# Patient Record
Sex: FEMALE | Race: BLACK OR AFRICAN AMERICAN | NOT HISPANIC OR LATINO | Employment: FULL TIME | URBAN - METROPOLITAN AREA
[De-identification: names, ages, dates, MRNs, and addresses within clinical notes are randomized per-mention and may not be internally consistent; named-entity substitution may affect disease eponyms.]

---

## 2017-02-07 ENCOUNTER — DOCUMENTATION ONLY (OUTPATIENT)
Dept: FAMILY MEDICINE | Facility: CLINIC | Age: 47
End: 2017-02-07

## 2017-02-07 NOTE — PROGRESS NOTES
Pre-Visit Chart Review  For Appointment Scheduled on 2/9/17    Health Maintenance Due   Topic Date Due    TETANUS VACCINE  10/27/1990

## 2017-02-09 ENCOUNTER — OFFICE VISIT (OUTPATIENT)
Dept: FAMILY MEDICINE | Facility: CLINIC | Age: 47
End: 2017-02-09
Payer: COMMERCIAL

## 2017-02-09 VITALS
HEIGHT: 67 IN | WEIGHT: 157.63 LBS | SYSTOLIC BLOOD PRESSURE: 138 MMHG | HEART RATE: 70 BPM | DIASTOLIC BLOOD PRESSURE: 83 MMHG | TEMPERATURE: 99 F | BODY MASS INDEX: 24.74 KG/M2

## 2017-02-09 DIAGNOSIS — Z86.010 HISTORY OF COLON POLYPS: ICD-10-CM

## 2017-02-09 DIAGNOSIS — M54.50 CHRONIC RIGHT-SIDED LOW BACK PAIN WITHOUT SCIATICA: Primary | ICD-10-CM

## 2017-02-09 DIAGNOSIS — G89.29 CHRONIC RIGHT-SIDED LOW BACK PAIN WITHOUT SCIATICA: Primary | ICD-10-CM

## 2017-02-09 PROCEDURE — 99213 OFFICE O/P EST LOW 20 MIN: CPT | Mod: S$GLB,,, | Performed by: NURSE PRACTITIONER

## 2017-02-09 PROCEDURE — 99999 PR PBB SHADOW E&M-EST. PATIENT-LVL IV: CPT | Mod: PBBFAC,,, | Performed by: NURSE PRACTITIONER

## 2017-02-09 NOTE — PROGRESS NOTES
Subjective:       Patient ID: Dayo Cortes is a 46 y.o. female.    Chief Complaint: Back Pain (right side radiating down leg)    HPI Comments: Ms. Cortes presents to the clinic today for back pain which has been present since September.  She states the pain is to the right lower back, worse with certain positions.  She has a hard time sitting or lying down for very long and gets up to walk around, which helps. She has started exercising a couple weeks ago but that hasn't helped.  She tried ibuprofen which didn't help and she states she does not like to take a lot of medicine.  She has not tried heat or ice.  She has not had formal physical therapy.    Back Pain   This is a chronic problem. The current episode started more than 1 month ago. The problem occurs intermittently. The problem is unchanged. The pain is present in the lumbar spine. The quality of the pain is described as aching. The pain does not radiate. The pain is at a severity of 3/10. The symptoms are aggravated by lying down, sitting and position. Pertinent negatives include no abdominal pain, bladder incontinence, bowel incontinence, chest pain, dysuria, fever, leg pain, numbness, paresis, paresthesias, pelvic pain or weakness. She has tried NSAIDs, home exercises and walking for the symptoms. The treatment provided moderate relief.     Review of Systems   Constitutional: Negative for chills, fatigue and fever.   HENT: Negative for congestion, ear pain and sinus pressure.    Respiratory: Negative for cough and shortness of breath.    Cardiovascular: Negative for chest pain, palpitations and leg swelling.   Gastrointestinal: Negative for abdominal pain, bowel incontinence, constipation and diarrhea.   Genitourinary: Negative for bladder incontinence, dysuria and pelvic pain.   Musculoskeletal: Positive for back pain.   Neurological: Negative for weakness, numbness and paresthesias.       Objective:      Physical Exam   Constitutional: She is  oriented to person, place, and time. She appears well-developed and well-nourished. No distress.   HENT:   Head: Normocephalic and atraumatic.   Right Ear: External ear normal.   Left Ear: External ear normal.   Eyes: Conjunctivae and EOM are normal.   Cardiovascular: Normal rate and regular rhythm.    Pulmonary/Chest: Effort normal.   Musculoskeletal: Normal range of motion.        Lumbar back: She exhibits tenderness and pain. She exhibits normal range of motion, no bony tenderness and no swelling.        Back:    Neurological: She is alert and oriented to person, place, and time.   Skin: Skin is warm and dry.   Psychiatric: She has a normal mood and affect. Her behavior is normal.   Vitals reviewed.          Current Outpatient Prescriptions:     multivitamin capsule, Take 1 capsule by mouth once daily., Disp: , Rfl:     naproxen (NAPROSYN) 500 MG tablet, Take 1 tablet (500 mg total) by mouth 2 (two) times daily with meals., Disp: 60 tablet, Rfl: 0    omeprazole (PRILOSEC) 20 MG capsule, TAKE 1 CAPSULE BY MOUTH DAILY, Disp: 30 capsule, Rfl: 2    SRONYX 0.1-20 mg-mcg per tablet, TAKE 1 TABLET BY MOUTH ONCE DAILY., Disp: 84 tablet, Rfl: 1    valacyclovir (VALTREX) 500 MG tablet, , Disp: , Rfl:   Assessment:       1. Chronic right-sided low back pain without sciatica    2. History of colon polyps        Plan:      Chronic right-sided low back pain without sciatica  Apply heat to site.  Back care tips handout provided.  -     Ambulatory Referral to Physical/Occupational Therapy    History of colon polyps  Per patient, due for colonoscopy.  -     Ambulatory referral to Gastroenterology

## 2017-02-09 NOTE — MR AVS SNAPSHOT
[facility]  [address] [place] [address]  Phone: [phone]  Fax: [phone]                  Dayo Sophia   2017 10:20 AM   Office Visit    Description:  Female : 1970   Provider:  MEGAN Finnegan   Department:  [facility]           Reason for Visit     Back Pain           Diagnoses this Visit        Comments    Chronic right-sided low back pain without sciatica    -  Primary     History of colon polyps                To Do List           Goals (5 Years of Data)     None      Follow-Up and Disposition     Return if symptoms worsen or fail to improve.      [facility]     [facility] Nurse Care Line -  Assistance  Registered nurses in the [facility] Center provide clinical advisement, health education, appointment booking, and other advisory services.  Call for this free service at [phone].             Medications           Message regarding Medications     Verify the changes and/or additions to your medication regime listed below are the same as discussed with your clinician today.  If any of these changes or additions are incorrect, please notify your healthcare provider.             Verify that the below list of medications is an accurate representation of the medications you are currently taking.  If none reported, the list may be blank. If incorrect, please contact your healthcare provider. Carry this list with you in case of emergency.           Current Medications     multivitamin capsule Take 1 capsule by mouth once daily.    naproxen (NAPROSYN) 500 MG tablet Take 1 tablet (500 mg total) by mouth 2 (two) times daily with meals.    omeprazole (PRILOSEC) 20 MG capsule TAKE 1 CAPSULE BY MOUTH DAILY    SRONYX 0.1-20 mg-mcg per tablet TAKE 1 TABLET BY MOUTH ONCE DAILY.    valacyclovir (VALTREX) 500 MG tablet            Clinical Reference Information           Your Vitals Were     BP Pulse Temp    138/83 (BP Location:  "Right arm, Patient Position: Sitting, BP Method: Automatic) 70 98.7 °F (37.1 °C) (Oral)    Height Weight BMI    5' 6.5" (1.689 m) 71.5 kg (157 lb 10.1 oz) 25.06 kg/m2      Blood Pressure          Most Recent Value    BP  138/83      Allergies as of 2/9/2017     No Known Allergies      Immunizations Administered on Date of Encounter - 2/9/2017     None      Orders Placed During Today's Visit      Normal Orders This Visit    Ambulatory referral to Gastroenterology     Ambulatory Referral to Physical/Occupational Therapy       Instructions      Back Care Tips    Caring for your back  These are things you can do to prevent a recurrence of acute back pain and to reduce symptoms from chronic back pain:  · Maintain a healthy weight. If you are overweight, losing weight will help most types of back pain.  · Exercise is an important part of recovery from most types of back pain. The muscles behind and in front of the spine support the back. This means strengthening both the back muscles and the abdominal muscles will provide better support for your spine.   · Swimming and brisk walking are good overall exercises to improve your fitness level.  · Practice safe lifting methods (below).  · Practice good posture when sitting, standing and walking. Avoid prolonged sitting. This puts more stress on the lower back than standing or walking.  · Wear quality shoes with sufficient arch support. Foot and ankle alignment can affect back symptoms. Women should avoid wearing high heels.  · Therapeutic massage can help relax the back muscles without stretching them.  · During the first 24 to 72 hours after an acute injury or flare-up of chronic back pain, apply an ice pack to the painful area for 20 minutes and then remove it for 20 minutes, over a period of 60 to 90 minutes, or several times a day. As a safety precaution, do not use a heating pad at bedtime. Sleeping on a heating pad can lead to skin burns or tissue damage.  · You can " alternate ice and heat therapies.  Medications  Talk to your healthcare provider before using medicines, especially if you have other medical problems or are taking other medicines.  · You may use acetaminophen or ibuprofen to control pain, unless your healthcare provider prescribed other pain medicine. If you have chronic conditions like diabetes, liver or kidney disease, stomach ulcers, or gastrointestinal bleeding, or are taking blood thinners, talk with your healthcare provider before taking any medicines.  · Be careful if you are given prescription pain medicines, narcotics, or medicine for muscle spasm. They can cause drowsiness, affect your coordination, reflexes, and judgment. Do not drive or operate heavy machinery while taking these types of medicines. Take prescription pain medicine only as prescribed by your healthcare provider.  Lumbar stretch  Here is a simple stretching exercise that will help relax muscle spasm and keep your back more limber. If exercise makes your back pain worse, dont do it.  · Lie on your back with your knees bent and both feet on the ground.  · Slowly raise your left knee to your chest as you flatten your lower back against the floor. Hold for 5 seconds.  · Relax and repeat the exercise with your right knee.  · Do 10 of these exercises for each leg.  Safe lifting method  · Dont bend over at the waist to lift an object off the floor.  Instead, bend your knees and hips in a squat.   · Keep your back and head upright  · Hold the object close to your body, directly in front of you.  · Straighten your legs to lift the object.   · Lower the object to the floor in the reverse fashion.  · If you must slide something across the floor, push it.  Posture tips  Sitting  Sit in chairs with straight backs or low-back support. Keep your knees lower than your hips, with your feet flat on the floor.  When driving, sit up straight. Adjust the seat forward so you are not leaning toward the  steering wheel.  A small pillow or rolled towel behind your lower back may help if you are driving long distances.   Standing  When standing for long periods, shift most of your weight to one leg at a time. Alternate legs every few minutes.   Sleeping  The best way to sleep is on your side with your knees bent. Put a low pillow under your head to support your neck in a neutral spine position. Avoid thick pillows that bend your neck to one side. Put a pillow between your legs to further relax your lower back. If you sleep on your back, put pillows under your knees to support your legs in a slightly flexed position. Use a firm mattress. If your mattress sags, replace it, or use a 1/2-inch plywood board under the mattress to add support.  Follow-up care  Follow up with your healthcare provider, or as advised.  If X-rays, a CT scan or an MRI scan were taken, they will be reviewed by a radiologist. You will be notified of any new findings that may affect your care.  Call 911  Seek emergency medical care if any of the following occur:  · Trouble breathing  · Confusion  · Very drowsy  · Fainting or loss of consciousness  · Rapid or very slow heart rate  · Loss of  bowel or bladder control  When to seek medical care  Call your healthcare provider if any of the following occur:  · Pain becomes worse or spreads to your arms or legs  · Weakness or numbness in one or both arms or legs  · Numbness in the groin area  Date Last Reviewed: 6/1/2016  © 1802-0428 [employer]. [address] [place] [address], [place] [address]. All rights reserved. This information is not intended as a substitute for professional medical care. Always follow your healthcare professional's instructions.             Language Assistance Services     ATTENTION: Language assistance services are available, free of charge. Please call [phone].      ATENCIÓN: Si habla español, tiene a ontiveros disposición servicios gratuitos de asistencia lingüística.  Michelle smalls [phone].     TANYA Ý: N?u b?n nói Ti?ng Vi?t, có các d?ch v? h? tr? ngôn ng? mi?n phí dành cho b?n. G?i s? [phone].         [facility] complies with applicable Federal civil rights laws and does not discriminate on the basis of race, color, national origin, age, disability, or sex.

## 2017-02-09 NOTE — PATIENT INSTRUCTIONS
Back Care Tips    Caring for your back  These are things you can do to prevent a recurrence of acute back pain and to reduce symptoms from chronic back pain:  · Maintain a healthy weight. If you are overweight, losing weight will help most types of back pain.  · Exercise is an important part of recovery from most types of back pain. The muscles behind and in front of the spine support the back. This means strengthening both the back muscles and the abdominal muscles will provide better support for your spine.   · Swimming and brisk walking are good overall exercises to improve your fitness level.  · Practice safe lifting methods (below).  · Practice good posture when sitting, standing and walking. Avoid prolonged sitting. This puts more stress on the lower back than standing or walking.  · Wear quality shoes with sufficient arch support. Foot and ankle alignment can affect back symptoms. Women should avoid wearing high heels.  · Therapeutic massage can help relax the back muscles without stretching them.  · During the first 24 to 72 hours after an acute injury or flare-up of chronic back pain, apply an ice pack to the painful area for 20 minutes and then remove it for 20 minutes, over a period of 60 to 90 minutes, or several times a day. As a safety precaution, do not use a heating pad at bedtime. Sleeping on a heating pad can lead to skin burns or tissue damage.  · You can alternate ice and heat therapies.  Medications  Talk to your healthcare provider before using medicines, especially if you have other medical problems or are taking other medicines.  · You may use acetaminophen or ibuprofen to control pain, unless your healthcare provider prescribed other pain medicine. If you have chronic conditions like diabetes, liver or kidney disease, stomach ulcers, or gastrointestinal bleeding, or are taking blood thinners, talk with your healthcare provider before taking any medicines.  · Be careful if you are given  prescription pain medicines, narcotics, or medicine for muscle spasm. They can cause drowsiness, affect your coordination, reflexes, and judgment. Do not drive or operate heavy machinery while taking these types of medicines. Take prescription pain medicine only as prescribed by your healthcare provider.  Lumbar stretch  Here is a simple stretching exercise that will help relax muscle spasm and keep your back more limber. If exercise makes your back pain worse, dont do it.  · Lie on your back with your knees bent and both feet on the ground.  · Slowly raise your left knee to your chest as you flatten your lower back against the floor. Hold for 5 seconds.  · Relax and repeat the exercise with your right knee.  · Do 10 of these exercises for each leg.  Safe lifting method  · Dont bend over at the waist to lift an object off the floor.  Instead, bend your knees and hips in a squat.   · Keep your back and head upright  · Hold the object close to your body, directly in front of you.  · Straighten your legs to lift the object.   · Lower the object to the floor in the reverse fashion.  · If you must slide something across the floor, push it.  Posture tips  Sitting  Sit in chairs with straight backs or low-back support. Keep your knees lower than your hips, with your feet flat on the floor.  When driving, sit up straight. Adjust the seat forward so you are not leaning toward the steering wheel.  A small pillow or rolled towel behind your lower back may help if you are driving long distances.   Standing  When standing for long periods, shift most of your weight to one leg at a time. Alternate legs every few minutes.   Sleeping  The best way to sleep is on your side with your knees bent. Put a low pillow under your head to support your neck in a neutral spine position. Avoid thick pillows that bend your neck to one side. Put a pillow between your legs to further relax your lower back. If you sleep on your back, put pillows  under your knees to support your legs in a slightly flexed position. Use a firm mattress. If your mattress sags, replace it, or use a 1/2-inch plywood board under the mattress to add support.  Follow-up care  Follow up with your healthcare provider, or as advised.  If X-rays, a CT scan or an MRI scan were taken, they will be reviewed by a radiologist. You will be notified of any new findings that may affect your care.  Call 911  Seek emergency medical care if any of the following occur:  · Trouble breathing  · Confusion  · Very drowsy  · Fainting or loss of consciousness  · Rapid or very slow heart rate  · Loss of  bowel or bladder control  When to seek medical care  Call your healthcare provider if any of the following occur:  · Pain becomes worse or spreads to your arms or legs  · Weakness or numbness in one or both arms or legs  · Numbness in the groin area  Date Last Reviewed: 6/1/2016  © 7499-6527 [employer]. [address] [place] [address], [place] [address]. All rights reserved. This information is not intended as a substitute for professional medical care. Always follow your healthcare professional's instructions.

## 2017-02-15 DIAGNOSIS — A60.00 GENITAL HSV: ICD-10-CM

## 2017-02-15 RX ORDER — OMEPRAZOLE 20 MG/1
CAPSULE, DELAYED RELEASE ORAL
Qty: 30 CAPSULE | Refills: 1 | Status: SHIPPED | OUTPATIENT
Start: 2017-02-15 | End: 2019-03-13

## 2017-02-15 RX ORDER — VALACYCLOVIR HYDROCHLORIDE 500 MG/1
TABLET, FILM COATED ORAL
Qty: 180 TABLET | Refills: 0 | Status: SHIPPED | OUTPATIENT
Start: 2017-02-15 | End: 2017-02-25

## 2017-02-24 ENCOUNTER — TELEPHONE (OUTPATIENT)
Dept: FAMILY MEDICINE | Facility: CLINIC | Age: 47
End: 2017-02-24

## 2017-02-24 NOTE — TELEPHONE ENCOUNTER
----- Message from Shad Batista sent at 2/24/2017  3:31 PM CST -----  Contact: pt  Pt is requesting something is called into pharmacy for her for a sore thorat  Call Back#[phone]  Thanks    [id number] [place] - [address]  [address] [place] [address]  Phone: [phone] Fax: [phone]

## 2017-02-24 NOTE — TELEPHONE ENCOUNTER
Patient complains of very sore throat, swollen tonsils x 3days. Had fever, aches, chills, but has subsided. Went to walk in  today given Rocephin, but hasn't helped ST. 2/25/17  0800 for assessment

## 2017-02-25 ENCOUNTER — OFFICE VISIT (OUTPATIENT)
Dept: FAMILY MEDICINE | Facility: CLINIC | Age: 47
End: 2017-02-25
Payer: COMMERCIAL

## 2017-02-25 VITALS
HEIGHT: 66 IN | SYSTOLIC BLOOD PRESSURE: 133 MMHG | DIASTOLIC BLOOD PRESSURE: 87 MMHG | BODY MASS INDEX: 25.51 KG/M2 | HEART RATE: 97 BPM | WEIGHT: 158.75 LBS | TEMPERATURE: 99 F

## 2017-02-25 DIAGNOSIS — J02.9 ACUTE VIRAL PHARYNGITIS: Primary | ICD-10-CM

## 2017-02-25 PROCEDURE — 99213 OFFICE O/P EST LOW 20 MIN: CPT | Mod: 25,S$GLB,, | Performed by: FAMILY MEDICINE

## 2017-02-25 PROCEDURE — 99999 PR PBB SHADOW E&M-EST. PATIENT-LVL III: CPT | Mod: PBBFAC,,, | Performed by: FAMILY MEDICINE

## 2017-02-25 PROCEDURE — 96372 THER/PROPH/DIAG INJ SC/IM: CPT | Mod: S$GLB,,, | Performed by: FAMILY MEDICINE

## 2017-02-25 PROCEDURE — 1160F RVW MEDS BY RX/DR IN RCRD: CPT | Mod: S$GLB,,, | Performed by: FAMILY MEDICINE

## 2017-02-25 RX ORDER — DEXAMETHASONE SODIUM PHOSPHATE 4 MG/ML
8 INJECTION, SOLUTION INTRA-ARTICULAR; INTRALESIONAL; INTRAMUSCULAR; INTRAVENOUS; SOFT TISSUE
Status: DISCONTINUED | OUTPATIENT
Start: 2017-02-25 | End: 2017-02-25

## 2017-02-25 RX ORDER — IBUPROFEN 800 MG/1
800 TABLET ORAL 3 TIMES DAILY
Qty: 30 TABLET | Refills: 3 | Status: SHIPPED | OUTPATIENT
Start: 2017-02-25 | End: 2020-02-13

## 2017-02-25 RX ORDER — BETAMETHASONE SODIUM PHOSPHATE AND BETAMETHASONE ACETATE 3; 3 MG/ML; MG/ML
6 INJECTION, SUSPENSION INTRA-ARTICULAR; INTRALESIONAL; INTRAMUSCULAR; SOFT TISSUE ONCE
Status: COMPLETED | OUTPATIENT
Start: 2017-02-25 | End: 2017-02-25

## 2017-02-25 RX ADMIN — BETAMETHASONE SODIUM PHOSPHATE AND BETAMETHASONE ACETATE 6 MG: 3; 3 INJECTION, SUSPENSION INTRA-ARTICULAR; INTRALESIONAL; INTRAMUSCULAR; SOFT TISSUE at 08:02

## 2017-02-25 NOTE — PROGRESS NOTES
Administered 6 mg Celestone IM. Patient tolerated well. No bleeding at insertion site noted. Pain scale 0/10 with injection. 2 patient identifiers used (name, ), aseptic technique maintained.

## 2017-02-25 NOTE — MR AVS SNAPSHOT
[facility]  [address] [place] [address]  Phone: [phone]  Fax: [phone]                  Dayo Cortes   2017 8:00 AM   Office Visit    Description:  Female : 1970   Provider:  Mikal Ortega MD   Department:  [facility]           Reason for Visit     Sore Throat           Diagnoses this Visit        Comments    Acute viral pharyngitis    -  Primary            To Do List           Goals (5 Years of Data)     None      Follow-Up and Disposition     Return if symptoms worsen or fail to improve.       These Medications        Disp Refills Start End    ibuprofen (ADVIL,MOTRIN) 800 MG tablet 30 tablet 3 2017     Take 1 tablet (800 mg total) by mouth 3 (three) times daily. - Oral    Pharmacy: [id number] - [place] - [address]  #: [phone]         [facility]     [facility] Care [facility] -  Assistance  Registered nurses in the [facility] Call Center provide clinical advisement, health education, appointment booking, and other advisory services.  Call for this free service at [phone].             Medications           Message regarding Medications     Verify the changes and/or additions to your medication regime listed below are the same as discussed with your clinician today.  If any of these changes or additions are incorrect, please notify your healthcare provider.        START taking these NEW medications        Refills    ibuprofen (ADVIL,MOTRIN) 800 MG tablet 3    Sig: Take 1 tablet (800 mg total) by mouth 3 (three) times daily.    Class: Normal    Route: Oral      These medications were administered today        Dose Freq    betamethasone acetate-betamethasone sodium phosphate injection 6 mg 6 mg Once    Sig: Inject 1 mL (6 mg total) into the muscle once.    Class: Normal    Route: Intramuscular           Verify that the below list of medications is an accurate representation of the  medications you are currently taking.  If none reported, the list may be blank. If incorrect, please contact your healthcare provider. Carry this list with you in case of emergency.           Current Medications     multivitamin capsule Take 1 capsule by mouth once daily.    naproxen (NAPROSYN) 500 MG tablet Take 1 tablet (500 mg total) by mouth 2 (two) times daily with meals.    omeprazole (PRILOSEC) 20 MG capsule TAKE ONE CAPSULE BY MOUTH EVERY DAY    SRONYX 0.1-20 mg-mcg per tablet TAKE 1 TABLET BY MOUTH ONCE DAILY.    valacyclovir (VALTREX) 500 MG tablet     ibuprofen (ADVIL,MOTRIN) 800 MG tablet Take 1 tablet (800 mg total) by mouth 3 (three) times daily.           Clinical Reference Information           Your Vitals Were     BP                   133/87           Blood Pressure          Most Recent Value    BP  133/87      Allergies as of 2/25/2017     No Known Allergies      Immunizations Administered on Date of Encounter - 2/25/2017     None      Administrations This Visit     betamethasone acetate-betamethasone sodium phosphate injection 6 mg     Admin Date Action Dose Route Administered By             02/25/2017 Given 6 mg Intramuscular Kathryn Gay LPN                      Language Assistance Services     ATTENTION: Language assistance services are available, free of charge. Please call [phone].      ATENCIÓN: Si habla keith, tiene a ontiveros disposición servicios gratuitos de asistencia lingüística. Llame al [phone].     [facility] Ý: N?u b?n nói Ti?ng Vi?t, có các d?ch v? h? tr? ngôn ng? mi?n phí dành cho b?n. G?i s? [phone].         [facility] complies with applicable Federal civil rights laws and does not discriminate on the basis of race, color, national origin, age, disability, or sex.

## 2017-02-25 NOTE — PROGRESS NOTES
Subjective:       Patient ID: Dayo Cortes is a 46 y.o. female.    Chief Complaint: Sore Throat    HPI     Sore throat  Patient reports sore throat for approximately 3 days.  Patient states that she has no sick contacts identified with similar symptoms.  Her symptoms were an intensity of 8:10 on yesterday.  She went to an urgent care and received a Rocephin shot which has helped somewhat her symptoms.  Patient states she had White residue to back of her throat at some point.  She has had decreased sleep due to symptoms.  Patient reports associated nausea.  Patient has had fever but no chills.    Review of Systems   Constitutional: Positive for fever. Negative for chills.   HENT: Positive for sore throat.    Eyes: Negative for visual disturbance.   Respiratory: Negative for cough and shortness of breath.    Cardiovascular: Negative for chest pain and leg swelling.   Gastrointestinal: Positive for nausea. Negative for abdominal pain, blood in stool, constipation, diarrhea and vomiting.   Genitourinary: Negative for difficulty urinating, dysuria and hematuria.   Musculoskeletal: Negative for arthralgias.       Objective:      Physical Exam   Constitutional: She appears well-developed and well-nourished. No distress.   HENT:   Head: Normocephalic and atraumatic.   Mouth/Throat: Oropharynx is clear and moist. No oropharyngeal exudate.   Shotty lymphadenopathy. Minimal erythema to post. Oropharnx.    Eyes: EOM are normal. Pupils are equal, round, and reactive to light.   Neck: Normal range of motion. Neck supple. No thyromegaly present.   Cardiovascular: Normal rate, regular rhythm, normal heart sounds and intact distal pulses.    Pulmonary/Chest: Effort normal and breath sounds normal. No respiratory distress. She has no wheezes.   Abdominal: Soft. Bowel sounds are normal. She exhibits no distension and no mass. There is no tenderness.   Musculoskeletal: She exhibits no edema.   Lymphadenopathy:     She has no cervical  adenopathy.   Neurological: She is alert.   Skin: Skin is warm. No rash noted. No erythema.   Psychiatric: She has a normal mood and affect. Her behavior is normal.   Vitals reviewed.      Assessment:       1. Acute viral pharyngitis        Plan:       1. Acute viral pharyngitis  - ibuprofen (ADVIL,MOTRIN) 800 MG tablet; Take 1 tablet (800 mg total) by mouth 3 (three) times daily.  Dispense: 30 tablet; Refill: 3  - betamethasone acetate-betamethasone sodium phosphate injection 6 mg; Inject 1 mL (6 mg total) into the muscle once.    Portions of this note were created using Dragon voice recognition software. There may be voice recognition errors found in the text, and attempts were made to correct these errors prior to signature    Mikal Ortega MD    Family Medicine  2/25/2017

## 2017-03-09 ENCOUNTER — TELEPHONE (OUTPATIENT)
Dept: FAMILY MEDICINE | Facility: CLINIC | Age: 47
End: 2017-03-09

## 2017-03-09 ENCOUNTER — OFFICE VISIT (OUTPATIENT)
Dept: FAMILY MEDICINE | Facility: CLINIC | Age: 47
End: 2017-03-09
Payer: COMMERCIAL

## 2017-03-09 VITALS
TEMPERATURE: 100 F | WEIGHT: 154.13 LBS | DIASTOLIC BLOOD PRESSURE: 89 MMHG | HEIGHT: 66 IN | HEART RATE: 90 BPM | OXYGEN SATURATION: 99 % | SYSTOLIC BLOOD PRESSURE: 130 MMHG | BODY MASS INDEX: 24.77 KG/M2

## 2017-03-09 DIAGNOSIS — J02.9 PHARYNGITIS, UNSPECIFIED ETIOLOGY: Primary | ICD-10-CM

## 2017-03-09 LAB
CTP QC/QA: YES
S PYO RRNA THROAT QL PROBE: NEGATIVE

## 2017-03-09 PROCEDURE — 99999 PR PBB SHADOW E&M-EST. PATIENT-LVL III: CPT | Mod: PBBFAC,,, | Performed by: FAMILY MEDICINE

## 2017-03-09 PROCEDURE — 87880 STREP A ASSAY W/OPTIC: CPT | Mod: QW,S$GLB,, | Performed by: FAMILY MEDICINE

## 2017-03-09 PROCEDURE — 99213 OFFICE O/P EST LOW 20 MIN: CPT | Mod: S$GLB,,, | Performed by: FAMILY MEDICINE

## 2017-03-09 PROCEDURE — 1160F RVW MEDS BY RX/DR IN RCRD: CPT | Mod: S$GLB,,, | Performed by: FAMILY MEDICINE

## 2017-03-09 NOTE — TELEPHONE ENCOUNTER
----- Message from Eugenia Mora sent at 3/9/2017  8:10 AM CST -----  Contact: self   Patient wants to speak with a nurse regarding soar throat need some medical advice please call back at [phone] (home)

## 2017-03-09 NOTE — PROGRESS NOTES
Subjective:       Patient ID: Dayo Cortes is a 46 y.o. female.    Chief Complaint: Sore Throat    Sore Throat    This is a new problem. The current episode started in the past 7 days. The problem has been gradually worsening. Maximum temperature: 99.5 now and on Ibuprofen. The pain is moderate. Pertinent negatives include no abdominal pain, congestion, coughing, shortness of breath or vomiting. She has had no exposure to strep. She has tried NSAIDs for the symptoms. The treatment provided moderate relief.     Review of Systems   Constitutional: Negative for fever.   HENT: Positive for sore throat. Negative for congestion.    Respiratory: Negative for cough and shortness of breath.    Cardiovascular: Negative for chest pain.   Gastrointestinal: Positive for nausea. Negative for abdominal pain and vomiting.   Skin: Negative for rash.   All other systems reviewed and are negative.      Objective:      Physical Exam   Constitutional: She appears well-developed. No distress.   HENT:   Right Ear: Tympanic membrane is not erythematous.   Left Ear: Tympanic membrane is not erythematous.   Nose: Mucosal edema present. Right sinus exhibits no maxillary sinus tenderness. Left sinus exhibits no maxillary sinus tenderness.   Mouth/Throat: Posterior oropharyngeal erythema present. Tonsils are 1+ on the right. Tonsils are 1+ on the left. No tonsillar exudate.   Neck: Neck supple.   Cardiovascular: Normal rate and regular rhythm.    No murmur heard.  Pulmonary/Chest: Effort normal and breath sounds normal.   Lymphadenopathy:     She has no cervical adenopathy.       Assessment:       1. Pharyngitis, unspecified etiology        Plan:         Dayo was seen today for sore throat.    Diagnoses and all orders for this visit:    Pharyngitis, unspecified etiology  -     POCT Rapid Strep A    Strep negative; no exudate; prob viral.

## 2017-05-22 ENCOUNTER — TELEPHONE (OUTPATIENT)
Dept: OBSTETRICS AND GYNECOLOGY | Facility: CLINIC | Age: 47
End: 2017-05-22

## 2017-05-22 DIAGNOSIS — A60.00 GENITAL HSV: ICD-10-CM

## 2017-05-22 NOTE — TELEPHONE ENCOUNTER
Called and spoke to pt and she states that with this birth control pill she has been having break thru bleeding states that  She is on her second week of the pill pack and has been more than spotting also feels a little more bloated than usual, takes her birth control faithfully between 7-9 am in the morning. Informed the patient that i would have to check with the dr. Patient states that if dr changes the birth control, she had gotten sick off the last birth control before this one, she could not tolerate it.

## 2017-05-22 NOTE — TELEPHONE ENCOUNTER
----- Message from Alvina Jimenez sent at 5/22/2017  9:22 AM CDT -----  Contact: patient  Patient calling in regards to speaking with a Nurse. She is having a break through with her birth control. Please advise.  Call back .  Thanks!

## 2017-05-23 ENCOUNTER — TELEPHONE (OUTPATIENT)
Dept: FAMILY MEDICINE | Facility: CLINIC | Age: 47
End: 2017-05-23

## 2017-05-23 ENCOUNTER — TELEPHONE (OUTPATIENT)
Dept: OBSTETRICS AND GYNECOLOGY | Facility: CLINIC | Age: 47
End: 2017-05-23

## 2017-05-23 NOTE — TELEPHONE ENCOUNTER
----- Message from Mary Montoya sent at 5/23/2017 12:39 PM CDT -----  Refill on Rx Valacy.  Please send into [facility], call patient when called in at [phone].

## 2017-05-23 NOTE — TELEPHONE ENCOUNTER
----- Message from Mary Montoya sent at 5/23/2017 12:39 PM CDT -----  Refill on Rx Valacy.  Please send into [facility], call patient when called in at [phone].

## 2017-05-23 NOTE — TELEPHONE ENCOUNTER
----- Message from Hilaria Lundberg sent at 5/23/2017  9:00 AM CDT -----  Contact: self  Patient called asking for advice about break through bleeding with her birth control.  Please call patient at [phone]. Thanks!

## 2017-05-23 NOTE — TELEPHONE ENCOUNTER
Patient called back today , states that she said that today she is not spotting today it is a full blown period with clotting has changed 3 pads today, and is wanting to get something to stop the period, feels like she started the period earlier. Please advise?

## 2017-05-24 ENCOUNTER — TELEPHONE (OUTPATIENT)
Dept: FAMILY MEDICINE | Facility: CLINIC | Age: 47
End: 2017-05-24

## 2017-05-24 RX ORDER — VALACYCLOVIR HYDROCHLORIDE 500 MG/1
TABLET, FILM COATED ORAL
Qty: 180 TABLET | Refills: 0 | Status: SHIPPED | OUTPATIENT
Start: 2017-05-24 | End: 2017-12-28 | Stop reason: SDUPTHER

## 2017-05-24 NOTE — TELEPHONE ENCOUNTER
----- Message from Germania Sue sent at 5/24/2017  3:40 PM CDT -----  Patient was told by nurse yesterday that doctor would okay her prescription for genital warts this morning. Pharmacy has not received it yet. Please remit to:      [id number] - [place] - [address]  [address] [place] [address]  Phone: [phone] Fax: [phone]    Please call patient back when completed at [phone].

## 2017-06-02 ENCOUNTER — OFFICE VISIT (OUTPATIENT)
Dept: GASTROENTEROLOGY | Facility: CLINIC | Age: 47
End: 2017-06-02
Payer: COMMERCIAL

## 2017-06-02 VITALS
DIASTOLIC BLOOD PRESSURE: 78 MMHG | SYSTOLIC BLOOD PRESSURE: 136 MMHG | BODY MASS INDEX: 25.75 KG/M2 | WEIGHT: 160.25 LBS | HEART RATE: 88 BPM | HEIGHT: 66 IN

## 2017-06-02 DIAGNOSIS — D25.9 UTERINE FIBROIDS AFFECTING PREGNANCY, UNSPECIFIED TRIMESTER: ICD-10-CM

## 2017-06-02 DIAGNOSIS — O34.10 UTERINE FIBROIDS AFFECTING PREGNANCY, UNSPECIFIED TRIMESTER: ICD-10-CM

## 2017-06-02 DIAGNOSIS — Z83.719 FAMILY HISTORY OF COLONIC POLYPS: ICD-10-CM

## 2017-06-02 DIAGNOSIS — Z86.010 HISTORY OF COLON POLYPS: ICD-10-CM

## 2017-06-02 DIAGNOSIS — Z80.0 FAMILY HISTORY OF COLON CANCER: ICD-10-CM

## 2017-06-02 DIAGNOSIS — K21.9 GASTROESOPHAGEAL REFLUX DISEASE WITHOUT ESOPHAGITIS: Primary | ICD-10-CM

## 2017-06-02 DIAGNOSIS — K62.5 BRBPR (BRIGHT RED BLOOD PER RECTUM): ICD-10-CM

## 2017-06-02 PROCEDURE — 99244 OFF/OP CNSLTJ NEW/EST MOD 40: CPT | Mod: S$GLB,,, | Performed by: INTERNAL MEDICINE

## 2017-06-02 PROCEDURE — 99999 PR PBB SHADOW E&M-EST. PATIENT-LVL II: CPT | Mod: PBBFAC,,, | Performed by: INTERNAL MEDICINE

## 2017-06-02 NOTE — LETTER
June 2, 2017      Linden Doshi MD  [address]  [place] [address]           [place] [facility]  [address]  [place] [address]  Phone: [phone]          Patient: Dayo Cortes   MR Number: [id number]   YOB: 1970   Date of Visit: 6/2/2017       Dear Dr. Linden Doshi:    Thank you for referring Dayo Cortes to me for evaluation. Attached you will find relevant portions of my assessment and plan of care.    If you have questions, please do not hesitate to call me. I look forward to following Dayo Cortes along with you.    Sincerely,    Win Rodriguez MD    Enclosure  CC:  No Recipients    If you would like to receive this communication electronically, please contact [email or web address] or [phone] to request more information on [facility] access.    For providers and/or their staff who would like to refer a patient to [facility], please contact us through our one-stop-shop provider referral line, [facility], at [phone].    If you feel you have received this communication in error or would no longer like to receive these types of communications, please e-mail [email or web address]

## 2017-06-02 NOTE — PROGRESS NOTES
Subjective:       Patient ID: Dayo Cortes is a 46 y.o. female.    This patient is new to me.  Referring MD:  Dr. Doshi for BRBPR.      Chief Complaint: BRBPR    Patient seen for evaluation of blood in stool, noted grossly, diagnosed recently, mild to moderate in severity, associated with rectal irritation and protruding hemorrhoids, with alleviating/exacerbating factors including none.   Previous colonoscopy was 2003.  Family history of colon cancer is positive in a second degree relative, however, her mother had adenomatous polyps in her 30s.  In addition, the patient had a colon polyp (unknown histology) [place].  Images from this were independently reviewed and polyp appeared to be about 5 mm in size.  She admits to intermittent dyspepsia, but has no alarm symptoms.  She takes on demand PPI with good relief and intermittently uses NSAIDs for back pain.  She has never had EGD.  She has some uterine bleeding with a known history of fibroids and is being followed by GYN for this.        Review of Systems   Constitutional: Negative for chills, fatigue and fever.   HENT: Negative for sore throat and trouble swallowing.    Respiratory: Negative for cough, shortness of breath and wheezing.    Cardiovascular: Negative for chest pain and palpitations.   Gastrointestinal: Positive for blood in stool. Negative for abdominal pain, nausea and vomiting.   Genitourinary: Positive for vaginal bleeding. Negative for dysuria and hematuria.   Musculoskeletal: Positive for back pain. Negative for arthralgias and myalgias.   Skin: Negative for color change and rash.   Neurological: Negative for dizziness and headaches.   Hematological: Negative for adenopathy.   Psychiatric/Behavioral: Negative for confusion. The patient is not nervous/anxious.    All other systems reviewed and are negative.      Objective:       Vitals:    06/02/17 0936   BP: 136/78   Pulse: 88   Weight: 72.7 kg (160 lb 4.4 oz)  "  Height: 5' 6" (1.676 m)       Physical Exam   Constitutional: She appears well-developed and well-nourished.   HENT:   Head: Normocephalic and atraumatic.   Eyes: Pupils are equal, round, and reactive to light. No scleral icterus.   Neck: Normal range of motion.   Cardiovascular: Normal rate and regular rhythm.    No murmur heard.  Pulmonary/Chest: Effort normal and breath sounds normal. She has no wheezes.   Abdominal: Soft. Bowel sounds are normal. She exhibits no distension. There is no tenderness.   Musculoskeletal: She exhibits no edema or tenderness.   Lymphadenopathy:     She has no cervical adenopathy.   Neurological: She is alert.   Skin: Skin is warm and dry. No rash noted.         Lab Results   Component Value Date    WBC 7.40 09/01/2016    HGB 13.0 09/01/2016    HCT 40.0 09/01/2016    MCV 82 09/01/2016     09/01/2016       CMP  Sodium   Date Value Ref Range Status   09/01/2016 138 136 - 145 mmol/L Final     Potassium   Date Value Ref Range Status   09/01/2016 3.9 3.5 - 5.1 mmol/L Final     Chloride   Date Value Ref Range Status   09/01/2016 106 95 - 110 mmol/L Final     CO2   Date Value Ref Range Status   09/01/2016 26 23 - 29 mmol/L Final     Glucose   Date Value Ref Range Status   09/01/2016 81 70 - 110 mg/dL Final     BUN, Bld   Date Value Ref Range Status   09/01/2016 14 6 - 20 mg/dL Final     Creatinine   Date Value Ref Range Status   09/01/2016 1.0 0.5 - 1.4 mg/dL Final     Calcium   Date Value Ref Range Status   09/01/2016 9.3 8.7 - 10.5 mg/dL Final     Total Protein   Date Value Ref Range Status   09/01/2016 7.3 6.0 - 8.4 g/dL Final     Albumin   Date Value Ref Range Status   09/01/2016 3.8 3.5 - 5.2 g/dL Final     Total Bilirubin   Date Value Ref Range Status   09/01/2016 0.9 0.1 - 1.0 mg/dL Final     Comment:     For infants and newborns, interpretation of results should be based  on gestational age, weight and in agreement with clinical  observations.  Premature Infant recommended " reference ranges:  Up to 24 hours.............<8.0 mg/dL  Up to 48 hours............<12.0 mg/dL  3-5 days..................<15.0 mg/dL  6-29 days.................<15.0 mg/dL       Alkaline Phosphatase   Date Value Ref Range Status   09/01/2016 49 (L) 55 - 135 U/L Final     AST   Date Value Ref Range Status   09/01/2016 30 10 - 40 U/L Final     ALT   Date Value Ref Range Status   09/01/2016 21 10 - 44 U/L Final     Anion Gap   Date Value Ref Range Status   09/01/2016 6 (L) 8 - 16 mmol/L Final     eGFR if    Date Value Ref Range Status   09/01/2016 >60.0 >60 mL/min/1.73 m^2 Final     eGFR if non    Date Value Ref Range Status   09/01/2016 >60.0 >60 mL/min/1.73 m^2 Final     Comment:     Calculation used to obtain the estimated glomerular filtration  rate (eGFR) is the CKD-EPI equation. Since race is unknown   in our information system, the eGFR values for   -American and Non--American patients are given   for each creatinine result.         Images from prior colonoscopy were independently visualized and reviewed by me and showed findings as above in HPI.    Old records from Dr. Doshi reviewed and are notable for history as above in HPI as well as minor BRBPR for which he has requested further evaluation.        Assessment:       1. Gastroesophageal reflux disease without esophagitis    2. BRBPR (bright red blood per rectum)    3. History of colon polyps    4. Family history of colonic polyps    5. Family history of colon cancer    6. Uterine fibroids affecting pregnancy, unspecified trimester        Plan:       1.  High fiber diet  2.  Topical therapy for hemorrhoids including sitz baths and balneol lotion  3.  Colonoscopy for surveillance  4.  EGD deferred for now given absence of alarm features  5.  Recommend daily PPI while taking NSAIDs.  Otherwise may use on demand.  6.  Consider referral to colorectal surgery for hemorrhoidal banding depending on colonoscopy  findings.  7.  Further recommendations to follow after above.  8.  Communication will be sent to the referring MD, Dr. Doshi regarding my assessment and plan on this patient via EPIC.

## 2017-06-05 ENCOUNTER — TELEPHONE (OUTPATIENT)
Dept: OBSTETRICS AND GYNECOLOGY | Facility: CLINIC | Age: 47
End: 2017-06-05

## 2017-06-05 RX ORDER — LEVONORGESTREL AND ETHINYL ESTRADIOL 0.1-0.02MG
1 KIT ORAL DAILY
Qty: 28 TABLET | Refills: 12 | Status: SHIPPED | OUTPATIENT
Start: 2017-06-05 | End: 2017-07-07

## 2017-06-05 NOTE — TELEPHONE ENCOUNTER
----- Message from Nancy Davis sent at 6/5/2017  8:37 AM CDT -----  Patient is requesting to change her birth control Rx please call to mary, [phone] (home)

## 2017-06-05 NOTE — TELEPHONE ENCOUNTER
Patient is needing a refill on her birth control, but she would like something different than the Sronyx. She states that she has had a 10lb weight gain in the last 3 months and believes it is related to the Sronyx. She is requesting to change back to the levonorgestrel-ethinyl estradiol. Please advise./lpd

## 2017-06-07 ENCOUNTER — SURGERY (OUTPATIENT)
Age: 47
End: 2017-06-07

## 2017-06-07 ENCOUNTER — ANESTHESIA EVENT (OUTPATIENT)
Dept: ENDOSCOPY | Facility: HOSPITAL | Age: 47
End: 2017-06-07
Payer: COMMERCIAL

## 2017-06-07 ENCOUNTER — ANESTHESIA (OUTPATIENT)
Dept: ENDOSCOPY | Facility: HOSPITAL | Age: 47
End: 2017-06-07
Payer: COMMERCIAL

## 2017-06-07 ENCOUNTER — HOSPITAL ENCOUNTER (OUTPATIENT)
Facility: HOSPITAL | Age: 47
Discharge: HOME | End: 2017-06-07
Attending: INTERNAL MEDICINE | Admitting: INTERNAL MEDICINE
Payer: COMMERCIAL

## 2017-06-07 VITALS — RESPIRATION RATE: 15 BRPM

## 2017-06-07 DIAGNOSIS — Z86.010 HX OF COLONIC POLYPS: ICD-10-CM

## 2017-06-07 DIAGNOSIS — K63.5 POLYP OF COLON, UNSPECIFIED PART OF COLON, UNSPECIFIED TYPE: Primary | ICD-10-CM

## 2017-06-07 DIAGNOSIS — K64.8 INTERNAL HEMORRHOIDS: ICD-10-CM

## 2017-06-07 PROBLEM — Z86.0100 HX OF COLONIC POLYPS: Status: ACTIVE | Noted: 2017-06-07

## 2017-06-07 LAB
B-HCG UR QL: NEGATIVE
CTP QC/QA: YES

## 2017-06-07 PROCEDURE — 63600175 PHARM REV CODE 636 W HCPCS: Performed by: NURSE ANESTHETIST, CERTIFIED REGISTERED

## 2017-06-07 PROCEDURE — 27201012 HC FORCEPS, HOT/COLD, DISP: Performed by: INTERNAL MEDICINE

## 2017-06-07 PROCEDURE — 81025 URINE PREGNANCY TEST: CPT | Performed by: INTERNAL MEDICINE

## 2017-06-07 PROCEDURE — 37000009 HC ANESTHESIA EA ADD 15 MINS: Performed by: INTERNAL MEDICINE

## 2017-06-07 PROCEDURE — 37000008 HC ANESTHESIA 1ST 15 MINUTES: Performed by: INTERNAL MEDICINE

## 2017-06-07 PROCEDURE — 45385 COLONOSCOPY W/LESION REMOVAL: CPT | Mod: ,,, | Performed by: INTERNAL MEDICINE

## 2017-06-07 PROCEDURE — 25000003 PHARM REV CODE 250: Performed by: INTERNAL MEDICINE

## 2017-06-07 PROCEDURE — 45380 COLONOSCOPY AND BIOPSY: CPT | Performed by: INTERNAL MEDICINE

## 2017-06-07 PROCEDURE — 45380 COLONOSCOPY AND BIOPSY: CPT | Mod: 59,,, | Performed by: INTERNAL MEDICINE

## 2017-06-07 PROCEDURE — 45385 COLONOSCOPY W/LESION REMOVAL: CPT | Performed by: INTERNAL MEDICINE

## 2017-06-07 PROCEDURE — 88305 TISSUE EXAM BY PATHOLOGIST: CPT | Performed by: PATHOLOGY

## 2017-06-07 PROCEDURE — D9220A PRA ANESTHESIA: Mod: CRNA,,, | Performed by: NURSE ANESTHETIST, CERTIFIED REGISTERED

## 2017-06-07 PROCEDURE — D9220A PRA ANESTHESIA: Mod: ANES,,, | Performed by: ANESTHESIOLOGY

## 2017-06-07 PROCEDURE — 27201089 HC SNARE, DISP (ANY): Performed by: INTERNAL MEDICINE

## 2017-06-07 RX ORDER — SODIUM CHLORIDE 9 MG/ML
INJECTION, SOLUTION INTRAVENOUS CONTINUOUS
Status: DISCONTINUED | OUTPATIENT
Start: 2017-06-07 | End: 2017-06-07 | Stop reason: HOSPADM

## 2017-06-07 RX ORDER — PROPOFOL 10 MG/ML
VIAL (ML) INTRAVENOUS
Status: DISCONTINUED | OUTPATIENT
Start: 2017-06-07 | End: 2017-06-07

## 2017-06-07 RX ORDER — HYDROCORTISONE ACETATE 25 MG/1
25 SUPPOSITORY RECTAL 2 TIMES DAILY
Qty: 20 SUPPOSITORY | Refills: 0 | Status: SHIPPED | OUTPATIENT
Start: 2017-06-07 | End: 2017-06-17

## 2017-06-07 RX ADMIN — PROPOFOL 50 MG: 10 INJECTION, EMULSION INTRAVENOUS at 09:06

## 2017-06-07 RX ADMIN — SODIUM CHLORIDE 1000 ML: 0.9 INJECTION, SOLUTION INTRAVENOUS at 10:06

## 2017-06-07 RX ADMIN — SODIUM CHLORIDE 1000 ML: 0.9 INJECTION, SOLUTION INTRAVENOUS at 08:06

## 2017-06-07 RX ADMIN — PROPOFOL 120 MG: 10 INJECTION, EMULSION INTRAVENOUS at 09:06

## 2017-06-07 NOTE — TRANSFER OF CARE
"Anesthesia Transfer of Care Note    Patient: Dayo Cortes    Procedure(s) Performed: Procedure(s) (LRB):  COLONOSCOPY (N/A)    Patient location: GI    Anesthesia Type: general    Transport from OR: Transported from OR on 2-3 L/min O2 by NC with adequate spontaneous ventilation    Post pain: adequate analgesia    Post assessment: no apparent anesthetic complications and tolerated procedure well    Post vital signs: stable    Level of consciousness: awake    Nausea/Vomiting: no nausea/vomiting    Complications: none    Transfer of care protocol was followed      Last vitals:   Visit Vitals  /74 (BP Location: Left arm, Patient Position: Lying, BP Method: Automatic)   Pulse 90   Temp 36.3 °C (97.3 °F) (Oral)   Resp 13   Ht 5' 6" (1.676 m)   Wt 71.2 kg (157 lb)   LMP 05/15/2017   SpO2 100%   Breastfeeding? No   BMI 25.34 kg/m²     "

## 2017-06-07 NOTE — ANESTHESIA POSTPROCEDURE EVALUATION
"Anesthesia Post Evaluation    Patient: Dayo Cortes    Procedure(s) Performed: Procedure(s) (LRB):  COLONOSCOPY (N/A)    Final Anesthesia Type: general  Patient location during evaluation: PACU  Patient participation: Yes- Able to Participate  Level of consciousness: awake and alert and oriented  Post-procedure vital signs: reviewed and stable  Pain management: adequate  Airway patency: patent  PONV status at discharge: No PONV  Anesthetic complications: no      Cardiovascular status: blood pressure returned to baseline  Respiratory status: unassisted, spontaneous ventilation and room air  Hydration status: euvolemic  Follow-up not needed.        Visit Vitals  BP (!) 124/92   Pulse 72   Temp 36.3 °C (97.3 °F) (Oral)   Resp 16   Ht 5' 6" (1.676 m)   Wt 71.2 kg (157 lb)   LMP 05/15/2017   SpO2 100%   Breastfeeding? No   BMI 25.34 kg/m²       Pain/Temo Score: Pain Assessment Performed: Yes (6/7/2017  9:40 AM)  Presence of Pain: denies (6/7/2017  8:33 AM)      "

## 2017-06-07 NOTE — PLAN OF CARE
Pt states ready to go home , stable, nishant po fluids, ambulatory, denies pain. To car by w/c accompanied by pt escort.home w/ family

## 2017-06-07 NOTE — DISCHARGE INSTRUCTIONS
"Discharge Instructions: After Your Surgery/Procedure  Youve just had surgery. During surgery you were given medicine called anesthesia to keep you relaxed and free of pain. After surgery you may have some pain or nausea. This is common. Here are some tips for feeling better and getting well after surgery.     Stay on schedule with your medication.   Going home  Your doctor or nurse will show you how to take care of yourself when you go home. He or she will also answer your questions. Have an adult family member or friend drive you home.      For your safety we recommend these precaution for the first 24 hours after your procedure:  · Do not drive or use heavy equipment.  · Do not make important decisions or sign legal papers.  · Do not drink alcohol.  · Have someone stay with you, if needed. He or she can watch for problems and help keep you safe.  · Your concentration, balance, coordination, and judgement may be impaired for many hours after anesthesia.  Use caution when ambulating or standing up.     · You may feel weak and "washed out" after anesthesia and surgery.      Subtle residual effects of general anesthesia or sedation with regional / local anesthesia can last more than 24 hours.  Rest for the remainder of the day or longer if your Doctor/Surgeon has advised you to do so.  Although you may feel normal within the first 24 hours, your reflexes and mental ability may be impaired without you realizing it.  You may feel dizzy, lightheaded or sleepy for 24 hours or longer.      Be sure to go to all follow-up visits with your doctor. And rest after your surgery for as long as your doctor tells you to.  Coping with pain  If you have pain after surgery, pain medicine will help you feel better. Take it as told, before pain becomes severe. Also, ask your doctor or pharmacist about other ways to control pain. This might be with heat, ice, or relaxation. And follow any other instructions your surgeon or nurse gives " you.  Tips for taking pain medicine  To get the best relief possible, remember these points:  · Pain medicines can upset your stomach. Taking them with a little food may help.  · Most pain relievers taken by mouth need at least 20 to 30 minutes to start to work.  · Taking medicine on a schedule can help you remember to take it. Try to time your medicine so that you can take it before starting an activity. This might be before you get dressed, go for a walk, or sit down for dinner.  · Constipation is a common side effect of pain medicines. Call your doctor before taking any medicines such as laxatives or stool softeners to help ease constipation. Also ask if you should skip any foods. Drinking lots of fluids and eating foods such as fruits and vegetables that are high in fiber can also help. Remember, do not take laxatives unless your surgeon has prescribed them.  · Drinking alcohol and taking pain medicine can cause dizziness and slow your breathing. It can even be deadly. Do not drink alcohol while taking pain medicine.  · Pain medicine can make you react more slowly to things. Do not drive or run machinery while taking pain medicine.  Your health care provider may tell you to take acetaminophen to help ease your pain. Ask him or her how much you are supposed to take each day. Acetaminophen or other pain relievers may interact with your prescription medicines or other over-the-counter (OTC) drugs. Some prescription medicines have acetaminophen and other ingredients. Using both prescription and OTC acetaminophen for pain can cause you to overdose. Read the labels on your OTC medicines with care. This will help you to clearly know the list of ingredients, how much to take, and any warnings. It may also help you not take too much acetaminophen. If you have questions or do not understand the information, ask your pharmacist or health care provider to explain it to you before you take the OTC medicine.  Managing  nausea  Some people have an upset stomach after surgery. This is often because of anesthesia, pain, or pain medicine, or the stress of surgery. These tips will help you handle nausea and eat healthy foods as you get better. If you were on a special food plan before surgery, ask your doctor if you should follow it while you get better. These tips may help:  · Do not push yourself to eat. Your body will tell you when to eat and how much.  · Start off with clear liquids and soup. They are easier to digest.  · Next try semi-solid foods, such as mashed potatoes, applesauce, and gelatin, as you feel ready.  · Slowly move to solid foods. Dont eat fatty, rich, or spicy foods at first.  · Do not force yourself to have 3 large meals a day. Instead eat smaller amounts more often.  · Take pain medicines with a small amount of solid food, such as crackers or toast, to avoid nausea.     Call your surgeon if  · You still have pain an hour after taking medicine. The medicine may not be strong enough.  · You feel too sleepy, dizzy, or groggy. The medicine may be too strong.  · You have side effects like nausea, vomiting, or skin changes, such as rash, itching, or hives.       If you have obstructive sleep apnea  You were given anesthesia medicine during surgery to keep you comfortable and free of pain. After surgery, you may have more apnea spells because of this medicine and other medicines you were given. The spells may last longer than usual.   At home:  · Keep using the continuous positive airway pressure (CPAP) device when you sleep. Unless your health care provider tells you not to, use it when you sleep, day or night. CPAP is a common device used to treat obstructive sleep apnea.  · Talk with your provider before taking any pain medicine, muscle relaxants, or sedatives. Your provider will tell you about the possible dangers of taking these medicines.  © 7468-0083 [employer]. [address] [place] [address]  [place] [address]. All rights reserved. This information is not intended as a substitute for professional medical care. Always follow your healthcare professional's instructions.    Hemorrhoids    Hemorrhoids are swollen and inflamed veins inside the rectum and near the anus. The rectum is the last several inches of the colon. The anus is the passage between the rectum and the outside of the body.  Causes  The veins can become swollen due to increased pressure in them. This is most often caused by:  · Chronic constipation or diarrhea  · Straining when having a bowel movement  · Sitting too long on the toilet  · A low-fiber diet  · Pregnancy  Symptoms  · Bleeding from the rectum (this may be noticeable after bowel movements)  · Lump near the anus  · Itching around the anus  · Pain around the anus  There are different types of hemorrhoids. Depending on the type you have and the severity, you may be able to treat yourself at home. In some cases, a procedure may be the best treatment option. Your healthcare provider can tell you more about this, if needed.  Home care  General care  · To get relief from pain or itching, try:  ¨ Topical products. Your healthcare provider may prescribe or recommend creams, ointments, or pads that can be applied to the hemorrhoid. Use these exactly as directed.  ¨ Medicines. Your healthcare provider may recommend stool softeners, suppositories, or laxatives to help manage constipation. Use these exactly as directed.  ¨ Sitz baths. A sitz bath involves sitting in a few inches of warm bath water. Be careful not to make the water so hot that you burn yourself--test it before sitting in it. Soak for about 10 to 15 minutes a few times a day. This may help relieve pain.  Tips to help prevent hemorrhoids  · Eat more fiber. Fiber adds bulk to stool and absorbs water as it moves through your colon. This makes stool softer and easier to pass.  ¨ Increase the fiber in your diet with more fiber-rich foods.  These include fresh fruit, vegetables, and whole grains.  ¨ Take a fiber supplement or bulking agent, if advised to by your provider. These include products such as psyllium or methylcellulose.  · Drink plenty of water, if directed to by your provider. This can help keep stool soft.  · Be more active. Frequent exercise aids digestion and helps prevent constipation. It may also help make bowel movements more regular.  · Dont strain during bowel movements. This can make hemorrhoids more likely. Also, dont sit on the toilet for long periods of time.  Follow-up care  Follow up with your healthcare provider, or as advised. If a culture or imaging tests were done, you will be notified of the results when they are ready. This may take a few days or longer.  When to seek medical advice  Call your healthcare provider right away if any of these occur:  · Increased bleeding from the rectum  · Increased pain around the rectum or anus  · Weakness or dizziness  Call 911  Call 911 or return to the emergency department right away if any of these occur:  · Trouble breathing or swallowing  · Fainting or loss of consciousness  · Unusually fast heart rate  · Vomiting blood  · Large amounts of blood in stool  Date Last Reviewed: 6/22/2015  © 1372-1511 [employer]. [address] [place] [address], [place] [address]. All rights reserved. This information is not intended as a substitute for professional medical care. Always follow your healthcare professional's instructions.      Discharge Instructions: Eating a High-Fiber Diet  Your health care provider has prescribed a high-fiber diet for you. Fiber is what gives strength and structure to plants. Most grains, beans, vegetables, and fruits contain fiber. Foods rich in fiber are often low in calories and fat, but they fill you up more. These foods may also reduce the risk of certain health problems.  There are two types of fiber:  · Insoluble fiber. This is found in whole  grains, cereals, and certain fruits and vegetables (such as apple skins, corn, and beans). Insoluble fiber is made up mainly of plant cell walls. It may prevent constipation and reduce the risk of certain types of cancer.  · Soluble fiber. This type of fiber is found in oats, beans, nuts, and certain fruits and vegetables (such as strawberries and peas). Soluble fiber turns to gel in the digestive system, slowing the movement of the digestive tract. It helps control blood sugar levels and can reduce cholesterol, which may help lower the risk of heart disease. Soluble fiber can also help control appetite.     Home care  · Know how much fiber you need a day. The recommended daily amount of fiber is 25 grams for women and 38 grams for men. After age 50, daily fiber needs drop to 21 grams for women and 30 grams for men.  · Ask your doctor about a fiber supplement. (Always take fiber supplements with a large glass of water.)  · Keep track of how much fiber you eat.  · Eat a variety of foods high in fiber.  · Learn to read and understand food labels.  · Ask your healthcare provider how much water you should be drinking.  · Look for these high-fiber foods:  ¨ Whole-grain breads and cereals  § 6 ounces a day give you about 18 grams of fiber (1 ounce is equal to 1 slice of bread, 1 cup of dry cereal, or 1/2 cup of cooked rice).  § Include wheat and oat bran cereals, whole-wheat muffins or toast, and corn tortillas in your meals.  ¨ Fruits   § 2 cups a day give you about 8 grams of fiber.  § Apples, oranges, strawberries, pears, and bananas are good sources.  § Fruit juice does not contain as much fiber as the fruit it was made from.  ¨ Vegetables  § 2½ cups a day give you about 11 grams of fiber. Add asparagus, carrots, broccoli, peas, and corn to your meals.  ¨ Legumes  § 1/4 cup a day (in place of meat) gives you about 4 grams of fiber. Try navy beans, lentils, chickpeas, and soybeans.  ¨ Seeds   § A small handful of seeds  gives you about 3 grams of fiber. Try sunflower seeds.  Follow-up  Make a follow-up appointment with a nutritionist as directed by our staff.  Date Last Reviewed: 6/1/2015 © 2000-2016 [employer].. [address] [place] [address], [place] [address]. All rights reserved. This information is not intended as a substitute for professional medical care. Always follow your healthcare professional's instructions.        Understanding Colon and Rectal Polyps    The colon (also called the large intestine) is a muscular tube that forms the last part of the digestive tract. It absorbs water and stores food waste. The colon is about 4 to 6 feet long. The rectum is the last 6 inches of the colon. The colon and rectum have a smooth lining composed of millions of cells. Changes in these cells can lead to growths in the colon that can become cancerous and should be removed. Multiple tests are available to screen for colon cancer, but the colonoscopy is the most recommended test. During colonoscopy, these polyps can be removed. How often you need this test depends on many things including your condition, your family history, symptoms, and what the findings were at the previous colonoscopy.   When the colon lining changes  Changes that happen in the cells that line the colon or rectum can lead to growths called polyps. Over a period of years, polyps can turn cancerous. Removing polyps early may prevent cancer from ever forming.  Polyps  Polyps are fleshy clumps of tissue that form on the lining of the colon or rectum. Small polyps are usually benign (not cancerous). However, over time, cells in a polyp can change and become cancerous. Certain types of polyps known as adenomatous polyps are premalignant. The risk for invasive cancer increases with the size of the polyp and certain cell and gene features. This means that they can become cancerous if they're not removed. Hyperplastic polyps are benign. They can grow quite large and  not turn cancerous.   Cancer  Almost all colorectal cancers start when polyp cells begin growing abnormally. As a cancerous tumor grows, it may involve more and more of the colon or rectum. In time, cancer can also grow beyond the colon or rectum and spread to nearby organs or to glands called lymph nodes. The cells can also travel to other parts of the body. This is known as metastasis. The earlier a cancerous tumor is removed, the better the chance of preventing its spread.    Date Last Reviewed: 8/1/2016 © 2000-2016 [employer]. [address] [place] [address], [place] [address]. All rights reserved. This information is not intended as a substitute for professional medical care. Always follow your healthcare professional's instructions.      Procedural Sedation (Adult)  You have been given medicine by vein to make you sleep during your surgery. This may have included both a pain medicine and sleeping medicine. Most of the effects have worn off. But you may still have some drowsiness for the next 6 to 8 hours.  Home care  Follow these guidelines when you get home:  · For the next 8 hours, you should be watched by a responsible adult. This person should make sure your condition is not getting worse.  · Don't take any medicine by mouth for pain or for sleep during the next 4 hours. These might react with the medicines you were given in the hospital. This could cause a much stronger response than usual.  · Don't drink any alcohol for the next 24 hours.  · Don't drive, operate dangerous machinery, or make important business or personal decisions during the next 24 hours.  Follow-up care  Follow up with your healthcare provider if you are not alert and back to your usual level of activity within 12 hours.  When to seek medical advice  Call your healthcare provider right away if any of these occur:  · Drowsiness gets worse  · Weakness or dizziness gets worse  · Repeated vomiting  · You cannot be awakened   Date  Last Reviewed: 10/18/2016  © 4788-7126 [employer], "[employer] [address] [place] [address], [place] [address]. All rights reserved. This information is not intended as a substitute for professional medical care. Always follow your healthcare professional's instructions.

## 2017-06-07 NOTE — ANESTHESIA PREPROCEDURE EVALUATION
06/07/2017  Dayo Cortes is a 46 y.o., female.    Anesthesia Evaluation    I have reviewed the Patient Summary Reports.    I have reviewed the Nursing Notes.   I have reviewed the Medications.     Review of Systems  Anesthesia Hx:  No problems with previous Anesthesia    Social:  Non-Smoker    Hepatic/GI:   GERD, poorly controlled        Physical Exam  General:  Well nourished    Airway/Jaw/Neck:  Airway Findings: Mouth Opening: Normal Tongue: Normal  General Airway Assessment: Adult, Good  Mallampati: I  TM Distance: Normal, at least 6 cm       Chest/Lungs:  Chest/Lungs Findings: Clear to auscultation, Normal Respiratory Rate     Heart/Vascular:  Heart Findings: Rate: Normal  Rhythm: Regular Rhythm  Sounds: Normal        Mental Status:  Mental Status Findings:  Alert and Oriented, Cooperative         Anesthesia Plan  Type of Anesthesia, risks & benefits discussed:  Anesthesia Type:  general  Patient's Preference:   Intra-op Monitoring Plan:   Intra-op Monitoring Plan Comments:   Post Op Pain Control Plan:   Post Op Pain Control Plan Comments:   Induction:   IV  Beta Blocker:  Patient is not currently on a Beta-Blocker (No further documentation required).       Informed Consent:    ASA Score: 1     Day of Surgery Review of History & Physical: I have interviewed and examined the patient. I have reviewed the patient's H&P dated:  There are no significant changes.          Ready For Surgery From Anesthesia Perspective.

## 2017-06-08 VITALS
DIASTOLIC BLOOD PRESSURE: 93 MMHG | BODY MASS INDEX: 25.23 KG/M2 | RESPIRATION RATE: 16 BRPM | HEIGHT: 66 IN | TEMPERATURE: 97 F | WEIGHT: 157 LBS | HEART RATE: 68 BPM | SYSTOLIC BLOOD PRESSURE: 155 MMHG | OXYGEN SATURATION: 100 %

## 2017-06-09 ENCOUNTER — TELEPHONE (OUTPATIENT)
Dept: GASTROENTEROLOGY | Facility: CLINIC | Age: 47
End: 2017-06-09

## 2017-06-20 ENCOUNTER — TELEPHONE (OUTPATIENT)
Dept: GASTROENTEROLOGY | Facility: CLINIC | Age: 47
End: 2017-06-20

## 2017-06-20 NOTE — TELEPHONE ENCOUNTER
----- Message from Pao Link sent at 6/20/2017 12:31 PM CDT -----  Contact: self  Needs a call back regarding colonoscopy results.  Please call back at [phone] (home)

## 2017-06-21 RX ORDER — TERCONAZOLE 4 MG/G
1 CREAM VAGINAL DAILY
Qty: 1 TUBE | Refills: 0 | Status: CANCELLED | OUTPATIENT
Start: 2017-06-21

## 2017-06-21 NOTE — TELEPHONE ENCOUNTER
----- Message from Kimberly Velazquez sent at 6/21/2017  8:24 AM CDT -----  Contact: Patient  Dayo, [phone], Patient is calling to get a refill on Rx Terconazole cream .4%.     [facility]pharmacy #[id number] [place] [address]  Phone: [phone] Fax: [phone]    She has a yeast infection. Please advise. Thanks.

## 2017-06-21 NOTE — TELEPHONE ENCOUNTER
----- Message from Marina Estrella sent at 6/21/2017  3:43 PM CDT -----  Patient is calling to check status of her prescription request for a yeast infection, contact patient at [phone].    Thank you

## 2017-06-21 NOTE — TELEPHONE ENCOUNTER
Last ov 12/6/16. WWE scheduled for 7/7/17. Patient is requesting a refill on Terconazole Cream. Rx is pending./lpd

## 2017-06-22 RX ORDER — FLUCONAZOLE 150 MG/1
150 TABLET ORAL DAILY
Qty: 1 TABLET | Refills: 0 | OUTPATIENT
Start: 2017-06-22 | End: 2017-06-23

## 2017-07-07 ENCOUNTER — HOSPITAL ENCOUNTER (OUTPATIENT)
Dept: RADIOLOGY | Facility: CLINIC | Age: 47
Discharge: HOME | End: 2017-07-07
Attending: OBSTETRICS & GYNECOLOGY
Payer: COMMERCIAL

## 2017-07-07 ENCOUNTER — OFFICE VISIT (OUTPATIENT)
Dept: OBSTETRICS AND GYNECOLOGY | Facility: CLINIC | Age: 47
End: 2017-07-07
Payer: COMMERCIAL

## 2017-07-07 VITALS
SYSTOLIC BLOOD PRESSURE: 120 MMHG | BODY MASS INDEX: 24.64 KG/M2 | DIASTOLIC BLOOD PRESSURE: 84 MMHG | HEIGHT: 66 IN | WEIGHT: 153.31 LBS | HEART RATE: 109 BPM

## 2017-07-07 DIAGNOSIS — N92.6 IRREGULAR BLEEDING: ICD-10-CM

## 2017-07-07 DIAGNOSIS — Z01.419 WELL WOMAN EXAM WITH ROUTINE GYNECOLOGICAL EXAM: Primary | ICD-10-CM

## 2017-07-07 PROCEDURE — 88175 CYTOPATH C/V AUTO FLUID REDO: CPT

## 2017-07-07 PROCEDURE — 76856 US EXAM PELVIC COMPLETE: CPT | Mod: TC,PO

## 2017-07-07 PROCEDURE — 76830 TRANSVAGINAL US NON-OB: CPT | Mod: 26,,, | Performed by: RADIOLOGY

## 2017-07-07 PROCEDURE — 99396 PREV VISIT EST AGE 40-64: CPT | Mod: S$GLB,,, | Performed by: OBSTETRICS & GYNECOLOGY

## 2017-07-07 PROCEDURE — 99999 PR PBB SHADOW E&M-EST. PATIENT-LVL III: CPT | Mod: PBBFAC,,, | Performed by: OBSTETRICS & GYNECOLOGY

## 2017-07-07 PROCEDURE — 76856 US EXAM PELVIC COMPLETE: CPT | Mod: 26,,, | Performed by: RADIOLOGY

## 2017-07-09 NOTE — PROGRESS NOTES
SUBJECTIVE:   46 y.o. female   for annual routine Pap and checkup. Patient's last menstrual period was 2017..  She complains of vaginal bleeding.        Past Medical History:   Diagnosis Date    Abnormal Pap smear     once but repeated pap smear and the resulted WNLs.    Abnormal Pap smear of cervix     early 30 's became normal     Past Surgical History:   Procedure Laterality Date    COLONOSCOPY N/A 2017    Procedure: COLONOSCOPY;  Surgeon: Win Rodriguez MD;  Location: [facility];  Service: Endoscopy;  Laterality: N/A;     Social History     Social History    Marital status: Single     Spouse name: N/A    Number of children: N/A    Years of education: N/A     Occupational History    Not on file.     Social History Main Topics    Smoking status: Never Smoker    Smokeless tobacco: Never Used    Alcohol use Yes      Comment: occasional    Drug use: No    Sexual activity: Yes     Partners: Male     Birth control/ protection: OCP     Other Topics Concern    Not on file     Social History Narrative    No narrative on file     Family History   Problem Relation Age of Onset    Breast cancer Mother 40     unconfirmed.    Diabetes Maternal Grandmother     Ovarian cancer Neg Hx     Colon cancer Neg Hx      OB History    Para Term  AB Living   3 2 2   1 4   SAB TAB Ectopic Multiple Live Births     1     2      # Outcome Date GA Lbr Jd/2nd Weight Sex Delivery Anes PTL Lv   3 TAB            2 Term      Vag-Spont   DULCE   1 Term      Vag-Spont   DULCE            Current Outpatient Prescriptions   Medication Sig Dispense Refill    ibuprofen (ADVIL,MOTRIN) 800 MG tablet Take 1 tablet (800 mg total) by mouth 3 (three) times daily. 30 tablet 3    multivitamin capsule Take 1 capsule by mouth once daily.      naproxen (NAPROSYN) 500 MG tablet Take 1 tablet (500 mg total) by mouth 2 (two) times daily with meals. 60 tablet 0    omeprazole (PRILOSEC) 20 MG capsule TAKE ONE CAPSULE  "BY MOUTH EVERY DAY 30 capsule 1    valacyclovir (VALTREX) 500 MG tablet TAKE 1 TABLET BY MOUTH TWICE A  tablet 0    norgestrel-ethinyl estradiol (LO/OVRAL) 0.3-30 mg-mcg per tablet Take 1 tablet by mouth once daily. 28 tablet 11     No current facility-administered medications for this visit.      Allergies: Review of patient's allergies indicates no known allergies.     ROS:  Constitutional: no weight loss, weight gain, fever, fatigue  Eyes:  No vision changes, glasses/contacts  ENT/Mouth: No ulcers, sinus problems, ears ringing, headache  Cardiovascular: No inability to lie flat, chest pain, exercise intolerance, swelling, heart palpitations  Respiratory: No wheezing, coughing blood, shortness of breath, or cough  Gastrointestinal: No diarrhea, bloody stool, nausea/vomiting, constipation, gas, hemorrhoids  Genitourinary: No blood in urine, painful urination, urgency of urination, frequency of urination, incomplete emptying, incontinence, abnormal bleeding, painful periods, heavy periods, vaginal discharge, vaginal odor, painful intercourse, sexual problems, bleeding after intercourse.  Musculoskeletal: No muscle weakness  Skin/Breast: No painful breasts, nipple discharge, masses, rash, ulcers  Neurological: No passing out, seizures, numbness, headache  Endocrine: No diabetes, hypothyroid, hyperthyroid, hot flashes, hair loss, abnormal hair growth, ance  Psychiatric: No depression, crying  Hematologic: No bruises, bleeding, swollen lymph nodes, anemia.      OBJECTIVE:   The patient appears well, alert, oriented x 3, in no distress.  /84   Pulse 109   Ht 5' 6" (1.676 m)   Wt 69.6 kg (153 lb 5.3 oz)   LMP 06/29/2017   BMI 24.75 kg/m²   NECK: no thyromegaly, trachea midline  SKIN: no acne, striae, hirsutism  BREAST EXAM: breasts appear normal, no suspicious masses, no skin or nipple changes or axillary nodes  ABDOMEN: no hernias, masses, or hepatosplenomegaly  GENITALIA: normal external genitalia, " no erythema, no discharge  URETHRA: normal urethra, normal urethral meatus  VAGINA: Normal  CERVIX: no lesions or cervical motion tenderness  UTERUS: normal size, contour, position, consistency, mobility, non-tender  ADNEXA: normal adnexa    \  ASSESSMENT:   well woman  Irregular bleeding    PLAN:   Pelvic ultrasound  mammogram  pap smear  counseled on breast self exam and mammography screening  return annually or prn

## 2017-07-27 ENCOUNTER — OFFICE VISIT (OUTPATIENT)
Dept: OBSTETRICS AND GYNECOLOGY | Facility: CLINIC | Age: 47
End: 2017-07-27
Payer: COMMERCIAL

## 2017-07-27 VITALS — WEIGHT: 161.19 LBS | BODY MASS INDEX: 25.9 KG/M2 | HEIGHT: 66 IN

## 2017-07-27 DIAGNOSIS — N89.8 VAGINAL ITCHING: Primary | ICD-10-CM

## 2017-07-27 PROCEDURE — 87660 TRICHOMONAS VAGIN DIR PROBE: CPT

## 2017-07-27 PROCEDURE — 87480 CANDIDA DNA DIR PROBE: CPT

## 2017-07-27 PROCEDURE — 99213 OFFICE O/P EST LOW 20 MIN: CPT | Mod: S$GLB,,, | Performed by: OBSTETRICS & GYNECOLOGY

## 2017-07-27 PROCEDURE — 3008F BODY MASS INDEX DOCD: CPT | Mod: S$GLB,,, | Performed by: OBSTETRICS & GYNECOLOGY

## 2017-07-27 PROCEDURE — 99999 PR PBB SHADOW E&M-EST. PATIENT-LVL II: CPT | Mod: PBBFAC,,, | Performed by: OBSTETRICS & GYNECOLOGY

## 2017-07-27 RX ORDER — LEVONORGESTREL AND ETHINYL ESTRADIOL 0.1-0.02MG
KIT ORAL
COMMUNITY
Start: 2017-07-03 | End: 2017-11-28

## 2017-07-28 LAB
CANDIDA RRNA VAG QL PROBE: NEGATIVE
G VAGINALIS RRNA GENITAL QL PROBE: NEGATIVE
T VAGINALIS RRNA GENITAL QL PROBE: NEGATIVE

## 2017-09-18 ENCOUNTER — TELEPHONE (OUTPATIENT)
Dept: OBSTETRICS AND GYNECOLOGY | Facility: CLINIC | Age: 47
End: 2017-09-18

## 2017-09-18 NOTE — TELEPHONE ENCOUNTER
----- Message from Jax Vega sent at 9/18/2017  3:45 PM CDT -----  Contact: patient  Patient called regarding possible changing Rx for birth control.please call back at [phone]. Thanks,

## 2017-09-18 NOTE — TELEPHONE ENCOUNTER
Patient states that she has been taking the Chryselle birth control but she has gained 10 lbs since she switched to it in July. She is requesting to switch to an alternative. Please advise./lpd

## 2017-09-25 NOTE — TELEPHONE ENCOUNTER
Spoke to pt and  She states that she had tried the 5 year and the 10 year IUD both of them gave her bad cramps, prior to the chryslle she had break thru bleeding, the chryselle makes her feel bloated and gain weight , when she is off the pills for a week she loses 5-10 pounds. Told her it sounded like she had  Water weight. States that she is not sexually active , has no boyfriend is not dating, is going to get off the pills for 2 months and see how she does.

## 2017-10-30 ENCOUNTER — HOSPITAL ENCOUNTER (OUTPATIENT)
Dept: RADIOLOGY | Facility: CLINIC | Age: 47
Discharge: HOME | End: 2017-10-30
Attending: OBSTETRICS & GYNECOLOGY
Payer: COMMERCIAL

## 2017-10-30 DIAGNOSIS — Z12.39 ENCOUNTER FOR SPECIAL SCREENING EXAMINATION FOR NEOPLASM OF BREAST: ICD-10-CM

## 2017-10-30 PROCEDURE — 77063 BREAST TOMOSYNTHESIS BI: CPT | Mod: 26,,, | Performed by: RADIOLOGY

## 2017-10-30 PROCEDURE — 77067 SCR MAMMO BI INCL CAD: CPT | Mod: TC

## 2017-10-30 PROCEDURE — 77067 SCR MAMMO BI INCL CAD: CPT | Mod: 26,,, | Performed by: RADIOLOGY

## 2017-11-28 ENCOUNTER — OFFICE VISIT (OUTPATIENT)
Dept: OBSTETRICS AND GYNECOLOGY | Facility: CLINIC | Age: 47
End: 2017-11-28
Payer: COMMERCIAL

## 2017-11-28 ENCOUNTER — LAB VISIT (OUTPATIENT)
Dept: LAB | Facility: HOSPITAL | Age: 47
End: 2017-11-28
Attending: NURSE PRACTITIONER
Payer: COMMERCIAL

## 2017-11-28 VITALS
HEIGHT: 67 IN | HEART RATE: 114 BPM | SYSTOLIC BLOOD PRESSURE: 128 MMHG | WEIGHT: 164.69 LBS | BODY MASS INDEX: 25.85 KG/M2 | DIASTOLIC BLOOD PRESSURE: 86 MMHG

## 2017-11-28 DIAGNOSIS — N89.8 VAGINAL DISCHARGE: ICD-10-CM

## 2017-11-28 DIAGNOSIS — Z11.3 SCREEN FOR STD (SEXUALLY TRANSMITTED DISEASE): ICD-10-CM

## 2017-11-28 DIAGNOSIS — Z11.3 SCREEN FOR STD (SEXUALLY TRANSMITTED DISEASE): Primary | ICD-10-CM

## 2017-11-28 PROCEDURE — 86592 SYPHILIS TEST NON-TREP QUAL: CPT

## 2017-11-28 PROCEDURE — 87591 N.GONORRHOEAE DNA AMP PROB: CPT

## 2017-11-28 PROCEDURE — 99999 PR PBB SHADOW E&M-EST. PATIENT-LVL III: CPT | Mod: PBBFAC,,, | Performed by: NURSE PRACTITIONER

## 2017-11-28 PROCEDURE — 87660 TRICHOMONAS VAGIN DIR PROBE: CPT

## 2017-11-28 PROCEDURE — 87480 CANDIDA DNA DIR PROBE: CPT

## 2017-11-28 PROCEDURE — 36415 COLL VENOUS BLD VENIPUNCTURE: CPT

## 2017-11-28 PROCEDURE — 86703 HIV-1/HIV-2 1 RESULT ANTBDY: CPT

## 2017-11-28 PROCEDURE — 99213 OFFICE O/P EST LOW 20 MIN: CPT | Mod: S$GLB,,, | Performed by: NURSE PRACTITIONER

## 2017-11-28 NOTE — PROGRESS NOTES
Chief Complaint   Patient presents with    STD CHECK     has been having back ache that goes around to the sides wants to get checked       History of Present Illness: Dayo Cortes is a 47 y.o. female that presents today 11/28/2017 for   Pt here for STD testing including blood work. She was just informed that her boyfriend was unfaithful and she wants to have testing done. She denies any vaginal symptoms. She denies any other complaints or concerns.       Chief Complaint   Patient presents with    STD CHECK     has been having back ache that goes around to the sides wants to get checked         Past Medical History:   Diagnosis Date    Abnormal Pap smear 2006    once but repeated pap smear and the resulted WNLs.    Abnormal Pap smear of cervix     early 30 's became normal       Past Surgical History:   Procedure Laterality Date    COLONOSCOPY N/A 6/7/2017    Procedure: COLONOSCOPY;  Surgeon: Win Rodriguez MD;  Location: [facility];  Service: Endoscopy;  Laterality: N/A;       Current Outpatient Prescriptions   Medication Sig Dispense Refill    ibuprofen (ADVIL,MOTRIN) 800 MG tablet Take 1 tablet (800 mg total) by mouth 3 (three) times daily. 30 tablet 3    multivitamin capsule Take 1 capsule by mouth once daily.      naproxen (NAPROSYN) 500 MG tablet Take 1 tablet (500 mg total) by mouth 2 (two) times daily with meals. 60 tablet 0    omeprazole (PRILOSEC) 20 MG capsule TAKE ONE CAPSULE BY MOUTH EVERY DAY 30 capsule 1    valacyclovir (VALTREX) 500 MG tablet TAKE 1 TABLET BY MOUTH TWICE A  tablet 0     No current facility-administered medications for this visit.        Review of patient's allergies indicates:  No Known Allergies    Family History   Problem Relation Age of Onset    Breast cancer Mother 40     unconfirmed.    Diabetes Maternal Grandmother     Ovarian cancer Neg Hx     Colon cancer Neg Hx        Social History   Substance Use Topics    Smoking status: Never Smoker     "Smokeless tobacco: Never Used    Alcohol use Yes      Comment: occasional       OB History    Para Term  AB Living   3 2 2   1 4   SAB TAB Ectopic Multiple Live Births     1     2      # Outcome Date GA Lbr Jd/2nd Weight Sex Delivery Anes PTL Lv   3 TAB            2 Term      Vag-Spont   DULCE   1 Term      Vag-Spont   DULCE          Review of Symptoms:  GENERAL: Denies weight gain or weight loss. Feeling well overall.   SKIN: Denies rash or lesions.   ABDOMEN: No abdominal pain, constipation, diarrhea, nausea, vomiting or rectal bleeding.   URINARY: No frequency, dysuria, hematuria, or burning on urination.      /86   Pulse (!) 114   Ht 5' 6.75" (1.695 m)   Wt 74.7 kg (164 lb 10.9 oz)   LMP 2017 (Approximate)   Physical Exam:  APPEARANCE: Well nourished, well developed, in no acute distress.  SKIN: Normal skin turgor, no lesions.  PELVIC: Normal external female genitalia without lesions. Normal hair distribution. Vagina moist and well rugated without lesions; + thick white discharge. Cervix pink and without lesions.     ASSESSMENT/PLAN:  Screen for STD (sexually transmitted disease)  -     HIV-1 and HIV-2 antibodies; Future; Expected date: 2017  -     RPR; Future; Expected date: 2017  -     C. trachomatis/N. gonorrhoeae by AMP DNA Cervicovaginal  -     Vaginosis Screen by DNA Probe    Vaginal discharge  -     Vaginosis Screen by DNA Probe          Follow-up:  Will follow-up once results are received  RTC as needed  "

## 2017-11-29 LAB
C TRACH DNA SPEC QL NAA+PROBE: NOT DETECTED
CANDIDA RRNA VAG QL PROBE: NEGATIVE
G VAGINALIS RRNA GENITAL QL PROBE: POSITIVE
HIV 1+2 AB+HIV1 P24 AG SERPL QL IA: NEGATIVE
N GONORRHOEA DNA SPEC QL NAA+PROBE: NOT DETECTED
RPR SER QL: NORMAL
T VAGINALIS RRNA GENITAL QL PROBE: NEGATIVE

## 2017-12-01 ENCOUNTER — PATIENT MESSAGE (OUTPATIENT)
Dept: OBSTETRICS AND GYNECOLOGY | Facility: CLINIC | Age: 47
End: 2017-12-01

## 2017-12-01 RX ORDER — TINIDAZOLE 500 MG/1
2 TABLET ORAL
Qty: 8 TABLET | Refills: 0 | Status: SHIPPED | OUTPATIENT
Start: 2017-12-01 | End: 2017-12-03

## 2017-12-01 NOTE — TELEPHONE ENCOUNTER
----- Message from Nancy Davis sent at 12/1/2017  8:45 AM CST -----  Please call patient, she states she looked at her results & wants to know if she needs a Rx, [phone] (home)

## 2017-12-28 DIAGNOSIS — A60.00 GENITAL HSV: ICD-10-CM

## 2017-12-29 RX ORDER — VALACYCLOVIR HYDROCHLORIDE 500 MG/1
TABLET, FILM COATED ORAL
Qty: 180 TABLET | Refills: 0 | Status: SHIPPED | OUTPATIENT
Start: 2017-12-29 | End: 2018-12-26 | Stop reason: SDUPTHER

## 2018-04-09 ENCOUNTER — TELEPHONE (OUTPATIENT)
Dept: FAMILY MEDICINE | Facility: CLINIC | Age: 48
End: 2018-04-09

## 2018-04-09 NOTE — TELEPHONE ENCOUNTER
----- Message from Eugenia Mora sent at 4/9/2018  8:28 AM CDT -----  Contact: self   Patient want to know if you can write her a rx for yeast infection if so please send to [facility], any question please call back at [phone] (home)     [facility]/pharmacy #[id number] - [place] - [address]  [address] [place] [address]  Phone: [phone] Fax: [phone]

## 2018-04-10 ENCOUNTER — OFFICE VISIT (OUTPATIENT)
Dept: FAMILY MEDICINE | Facility: CLINIC | Age: 48
End: 2018-04-10
Payer: COMMERCIAL

## 2018-04-10 VITALS
SYSTOLIC BLOOD PRESSURE: 117 MMHG | HEART RATE: 93 BPM | HEIGHT: 67 IN | BODY MASS INDEX: 25.47 KG/M2 | DIASTOLIC BLOOD PRESSURE: 83 MMHG | WEIGHT: 162.25 LBS

## 2018-04-10 DIAGNOSIS — N89.8 VAGINAL ITCHING: Primary | ICD-10-CM

## 2018-04-10 DIAGNOSIS — N89.8 VAGINAL DISCHARGE: ICD-10-CM

## 2018-04-10 PROCEDURE — 99213 OFFICE O/P EST LOW 20 MIN: CPT | Mod: SA,S$GLB,, | Performed by: NURSE PRACTITIONER

## 2018-04-10 PROCEDURE — 99999 PR PBB SHADOW E&M-EST. PATIENT-LVL III: CPT | Mod: PBBFAC,,, | Performed by: NURSE PRACTITIONER

## 2018-04-10 PROCEDURE — 87480 CANDIDA DNA DIR PROBE: CPT

## 2018-04-10 PROCEDURE — 87510 GARDNER VAG DNA DIR PROBE: CPT

## 2018-04-10 NOTE — PROGRESS NOTES
Chief Complaint   Patient presents with    Vaginal Itching       History of Present Illness: Dayo Cortes is a 47 y.o. female that presents today 4/10/2018 for   Pt presents today for vaginal itching x 1 week. Pt denies any discharge, burning or odor. She does get recurrent bacterial vaginosis. She did report that she had been using vagisil for the itching. No other complaints or concerns noted.        Chief Complaint   Patient presents with    Vaginal Itching         Past Medical History:   Diagnosis Date    Abnormal Pap smear     once but repeated pap smear and the resulted WNLs.    Abnormal Pap smear of cervix     early 30 's became normal       Past Surgical History:   Procedure Laterality Date    COLONOSCOPY N/A 2017    Procedure: COLONOSCOPY;  Surgeon: Win Rodriguez MD;  Location: [facility];  Service: Endoscopy;  Laterality: N/A;       Current Outpatient Prescriptions   Medication Sig Dispense Refill    ibuprofen (ADVIL,MOTRIN) 800 MG tablet Take 1 tablet (800 mg total) by mouth 3 (three) times daily. 30 tablet 3    multivitamin capsule Take 1 capsule by mouth once daily.      omeprazole (PRILOSEC) 20 MG capsule TAKE ONE CAPSULE BY MOUTH EVERY DAY 30 capsule 1    valACYclovir (VALTREX) 500 MG tablet TAKE ONE TABLET BY MOUTH TWICE DAILY 180 tablet 0     No current facility-administered medications for this visit.        Review of patient's allergies indicates:  No Known Allergies    Family History   Problem Relation Age of Onset    Breast cancer Mother 40     unconfirmed.    Diabetes Maternal Grandmother     Ovarian cancer Neg Hx     Colon cancer Neg Hx        Social History   Substance Use Topics    Smoking status: Never Smoker    Smokeless tobacco: Never Used    Alcohol use Yes      Comment: occasional       OB History    Para Term  AB Living   3 2 2 0 1 2   SAB TAB Ectopic Multiple Live Births   0 1 0 0 2      # Outcome Date GA Lbr Jd/2nd Weight Sex Delivery Anes  "PTL Lv   3 TAB            2 Term      Vag-Spont   DULCE   1 Term      Vag-Spont   DULCE          Review of Symptoms:  GENERAL: Denies weight gain or weight loss. Feeling well overall.   SKIN: Denies rash or lesions.   HEAD: Denies head injury or headache.   ABDOMEN: No abdominal pain, constipation, diarrhea, nausea, vomiting or rectal bleeding.   URINARY: No frequency, dysuria, hematuria, or burning on urination.    /83   Pulse 93   Ht 5' 6.75" (1.695 m)   Wt 73.6 kg (162 lb 4.1 oz)   LMP 03/17/2018 (Exact Date)   Physical Exam:  APPEARANCE: Well nourished, well developed, in no acute distress.  SKIN: Normal skin turgor, no lesions.  RESPIRATORY: Normal respiratory effort with no retractions or use of accessory muscles  PELVIC: Normal external female genitalia without lesions. Normal hair distribution. Vagina moist and well rugated without lesions, + thin yellow discharge; no odor noted. Cervix pink and without lesions.       ASSESSMENT/PLAN:  Vaginal itching  -     Vaginosis Screen by DNA Probe    Vaginal discharge  -     Vaginosis Screen by DNA Probe          -Reinforced to avoid any scented products in the vaginal area such as bubble baths, bath bombs, scented soaps/body washes, douches, feminine washes, etc... Also wear cotton underwear and change out of wet/sweaty clothing as soon as possible. Pt verbalized understanding.    Follow-up:  Will follow-up once results are received  RTC if symptoms worsen or do not improve  RTC as needed        "

## 2018-04-11 ENCOUNTER — PATIENT MESSAGE (OUTPATIENT)
Dept: FAMILY MEDICINE | Facility: CLINIC | Age: 48
End: 2018-04-11

## 2018-04-11 ENCOUNTER — TELEPHONE (OUTPATIENT)
Dept: FAMILY MEDICINE | Facility: CLINIC | Age: 48
End: 2018-04-11

## 2018-04-11 LAB
CANDIDA RRNA VAG QL PROBE: POSITIVE
G VAGINALIS RRNA GENITAL QL PROBE: NEGATIVE
T VAGINALIS RRNA GENITAL QL PROBE: NEGATIVE

## 2018-04-11 RX ORDER — FLUCONAZOLE 150 MG/1
150 TABLET ORAL DAILY
Qty: 1 TABLET | Refills: 1 | Status: SHIPPED | OUTPATIENT
Start: 2018-04-11 | End: 2018-05-11

## 2018-04-11 NOTE — TELEPHONE ENCOUNTER
----- Message from Estelle Mabry sent at 4/11/2018 11:14 AM CDT -----  Contact: Pt  Pt is calling states she was diagnosed yesterday with BV and the wrong medications was sent in. Pt can be reached at [phone] (home) .    [facility]pharmacy #[id number] - [place] - [address]  [address] [place] [address]  Phone: [phone] Fax: [phone]

## 2018-04-11 NOTE — TELEPHONE ENCOUNTER
----- Message from Lovely Daniels sent at 4/11/2018  8:06 AM CDT -----  Contact: Patient  Patient is calling to see if her prescriptions have been called in because the pharmacy has not received anything yet.  Please call patient to let her know.  Call Back#[phone]  Thanks     [facility]/pharmacy #[id number] - [place] - [address]  [address] [place] [address]  Phone: [phone] Fax: [phone]

## 2018-04-11 NOTE — TELEPHONE ENCOUNTER
----- Message from Estelle Mabry sent at 4/10/2018  4:06 PM CDT -----  Contact: Pt  Pt is calling to check up on medication that was supposed to be sent. Pt states she doesn't know what the name of it is. Pt can be reached at [phone] (home)

## 2018-04-11 NOTE — TELEPHONE ENCOUNTER
Patient informed of test results and verbalized understanding. She states that she is now having a lot of external itching and would like for you to send her in a cream. Please advise.

## 2018-04-12 RX ORDER — NYSTATIN 100000 U/G
CREAM TOPICAL 2 TIMES DAILY
Qty: 15 G | Refills: 0 | Status: SHIPPED | OUTPATIENT
Start: 2018-04-12 | End: 2020-02-13

## 2018-04-23 RX ORDER — LEVONORGESTREL AND ETHINYL ESTRADIOL 0.1-0.02MG
1 KIT ORAL DAILY
Qty: 28 TABLET | Refills: 11 | Status: SHIPPED | OUTPATIENT
Start: 2018-04-23 | End: 2019-03-13

## 2018-04-23 NOTE — TELEPHONE ENCOUNTER
----- Message from Nancy Nassar sent at 4/23/2018  8:07 AM CDT -----  Contact: self  Type:  RX Refill Request    Who Called:  Patient  Refill or New Rx:  New refill  RX Name and Strength:    How is the patient currently taking it? (ex. 1XDay): no  Is this a 30 day or 90 day RX: 30 day   Preferred Pharmacy with phone number:   [facility]/pharmacy #[id number] - [place] - [address]  [address] [place] [address]  Phone: [phone] Fax: [phone]  Local or Mail Order:  local  Ordering Provider: ROGELIO Peña  Best Call Back Number: [phone] (home)   Additional Information:  Wanted the last birth control that was prescribed to her by ROGELIO Peña

## 2018-04-23 NOTE — TELEPHONE ENCOUNTER
----- Message from Katie Lopez sent at 4/23/2018 10:37 AM CDT -----  Contact: Self  Patient needs to speak to the nurse about faye Birth Control medication    Please call back [phone]

## 2018-04-23 NOTE — TELEPHONE ENCOUNTER
Patient is requesting to start oral birth control. States that she was taking Syronx and would like to resume. Her cycle went off 2 days ago. Refill pending if appropriate.

## 2018-06-12 ENCOUNTER — TELEPHONE (OUTPATIENT)
Dept: FAMILY MEDICINE | Facility: CLINIC | Age: 48
End: 2018-06-12

## 2018-06-12 NOTE — TELEPHONE ENCOUNTER
Called pt regarding below message. Informed pt of need to schedule appt. Appt date, time, and location given. Pt verbalized understanding with no further questions.     ----- Message from Tony Armstrong sent at 6/12/2018  8:40 AM CDT -----  Contact: Pt   Pt requested a callback to discuss a appointment for a continuous yeast infection..[phone] (home)

## 2018-06-12 NOTE — TELEPHONE ENCOUNTER
Called pt regarding below message.     ----- Message from Tony Armstrong sent at 6/12/2018  8:40 AM CDT -----  Contact: Pt   Pt requested a callback to discuss a appointment for a continuous yeast infection..[phone] (home)

## 2018-07-05 ENCOUNTER — OFFICE VISIT (OUTPATIENT)
Dept: FAMILY MEDICINE | Facility: CLINIC | Age: 48
End: 2018-07-05
Payer: COMMERCIAL

## 2018-07-05 VITALS
DIASTOLIC BLOOD PRESSURE: 90 MMHG | WEIGHT: 164.44 LBS | BODY MASS INDEX: 26.43 KG/M2 | HEART RATE: 84 BPM | HEIGHT: 66 IN | SYSTOLIC BLOOD PRESSURE: 131 MMHG

## 2018-07-05 DIAGNOSIS — N89.8 VAGINAL DISCHARGE: Primary | ICD-10-CM

## 2018-07-05 DIAGNOSIS — N89.8 VAGINAL ITCHING: ICD-10-CM

## 2018-07-05 PROCEDURE — 99999 PR PBB SHADOW E&M-EST. PATIENT-LVL III: CPT | Mod: PBBFAC,,, | Performed by: NURSE PRACTITIONER

## 2018-07-05 PROCEDURE — 99213 OFFICE O/P EST LOW 20 MIN: CPT | Mod: S$GLB,,, | Performed by: NURSE PRACTITIONER

## 2018-07-05 PROCEDURE — 87660 TRICHOMONAS VAGIN DIR PROBE: CPT

## 2018-07-05 PROCEDURE — 3008F BODY MASS INDEX DOCD: CPT | Mod: CPTII,S$GLB,, | Performed by: NURSE PRACTITIONER

## 2018-07-05 NOTE — PROGRESS NOTES
Chief Complaint   Patient presents with    Vaginal Itching       History of Present Illness: Dayo Cortes is a 47 y.o. female that presents today 7/5/2018 for   Pt presents today for vaginal discharge and itching x 1 week. She denies any burning or odor. She reports using a monistat treatment last week, but did not get much relief. She denies using anything scented in the vaginal area or changing any detergents or hygiene products. No other complaints or concerns noted.         Chief Complaint   Patient presents with    Vaginal Itching         Past Medical History:   Diagnosis Date    Abnormal Pap smear 2006    once but repeated pap smear and the resulted WNLs.    Abnormal Pap smear of cervix     early 30 's became normal       Past Surgical History:   Procedure Laterality Date    COLONOSCOPY N/A 6/7/2017    Procedure: COLONOSCOPY;  Surgeon: Win Rodriguez MD;  Location: [facility];  Service: Endoscopy;  Laterality: N/A;       Current Outpatient Prescriptions   Medication Sig Dispense Refill    ibuprofen (ADVIL,MOTRIN) 800 MG tablet Take 1 tablet (800 mg total) by mouth 3 (three) times daily. 30 tablet 3    multivitamin capsule Take 1 capsule by mouth once daily.      omeprazole (PRILOSEC) 20 MG capsule TAKE ONE CAPSULE BY MOUTH EVERY DAY 30 capsule 1    SRONYX 0.1-20 mg-mcg per tablet Take 1 tablet by mouth once daily. 28 tablet 11    valACYclovir (VALTREX) 500 MG tablet TAKE ONE TABLET BY MOUTH TWICE DAILY 180 tablet 0    nystatin (MYCOSTATIN) cream Apply topically 2 (two) times daily. 15 g 0     No current facility-administered medications for this visit.        Review of patient's allergies indicates:  No Known Allergies    Family History   Problem Relation Age of Onset    Breast cancer Mother 40        unconfirmed.    Diabetes Maternal Grandmother     Ovarian cancer Neg Hx     Colon cancer Neg Hx        Social History   Substance Use Topics    Smoking status: Never Smoker    Smokeless  "tobacco: Never Used    Alcohol use Yes      Comment: occasional       OB History    Para Term  AB Living   3 2 2 0 1 2   SAB TAB Ectopic Multiple Live Births   0 1 0 0 2      # Outcome Date GA Lbr Jd/2nd Weight Sex Delivery Anes PTL Lv   3 TAB            2 Term      Vag-Spont   DULCE   1 Term      Vag-Spont   DULCE          Review of Symptoms:  GENERAL: Denies weight gain or weight loss. Feeling well overall.   SKIN: Denies rash or lesions.   HEAD: Denies head injury or headache.   ABDOMEN: No abdominal pain, constipation, diarrhea, nausea, vomiting or rectal bleeding.   URINARY: No frequency, dysuria, hematuria, or burning on urination.    BP (!) 131/90   Pulse 84   Ht 5' 6" (1.676 m)   Wt 74.6 kg (164 lb 7.4 oz)   LMP 2018   Physical Exam:  APPEARANCE: Well nourished, well developed, in no acute distress.  SKIN: Normal skin turgor, no lesions.  RESPIRATORY: Normal respiratory effort with no retractions or use of accessory muscles  PELVIC: Normal external female genitalia without lesions. Normal hair distribution. Vagina moist and well rugated without lesions; + white clumpy discharge; no odor noted. Cervix pink and without lesions.     ASSESSMENT/PLAN:  Vaginal discharge  -     Vaginosis Screen by DNA Probe    Vaginal itching        -Reinforced to avoid any scented products in the vaginal area such as bubble baths, bath bombs, scented soaps/body washes, douches, feminine washes, etc... Also wear cotton underwear and change out of wet/sweaty clothing as soon as possible. Pt verbalized understanding.      Follow-up:  RTC if symptoms worsen or do not improve  RTC as needed    "

## 2018-07-06 ENCOUNTER — PATIENT MESSAGE (OUTPATIENT)
Dept: FAMILY MEDICINE | Facility: CLINIC | Age: 48
End: 2018-07-06

## 2018-09-13 ENCOUNTER — OFFICE VISIT (OUTPATIENT)
Dept: FAMILY MEDICINE | Facility: CLINIC | Age: 48
End: 2018-09-13
Payer: COMMERCIAL

## 2018-09-13 ENCOUNTER — LAB VISIT (OUTPATIENT)
Dept: LAB | Facility: HOSPITAL | Age: 48
End: 2018-09-13
Attending: NURSE PRACTITIONER
Payer: COMMERCIAL

## 2018-09-13 VITALS
SYSTOLIC BLOOD PRESSURE: 130 MMHG | HEART RATE: 98 BPM | WEIGHT: 166.88 LBS | DIASTOLIC BLOOD PRESSURE: 75 MMHG | BODY MASS INDEX: 26.82 KG/M2 | HEIGHT: 66 IN

## 2018-09-13 DIAGNOSIS — Z12.31 SCREENING MAMMOGRAM, ENCOUNTER FOR: ICD-10-CM

## 2018-09-13 DIAGNOSIS — Z01.419 ENCOUNTER FOR WELL WOMAN EXAM: Primary | ICD-10-CM

## 2018-09-13 DIAGNOSIS — Z11.3 ENCOUNTER FOR SCREENING EXAMINATION FOR SEXUALLY TRANSMITTED DISEASE: ICD-10-CM

## 2018-09-13 DIAGNOSIS — R10.2 PELVIC PAIN: ICD-10-CM

## 2018-09-13 PROCEDURE — 99999 PR PBB SHADOW E&M-EST. PATIENT-LVL III: CPT | Mod: PBBFAC,,, | Performed by: NURSE PRACTITIONER

## 2018-09-13 PROCEDURE — 36415 COLL VENOUS BLD VENIPUNCTURE: CPT | Mod: PO

## 2018-09-13 PROCEDURE — 87491 CHLMYD TRACH DNA AMP PROBE: CPT

## 2018-09-13 PROCEDURE — 86592 SYPHILIS TEST NON-TREP QUAL: CPT

## 2018-09-13 PROCEDURE — 86703 HIV-1/HIV-2 1 RESULT ANTBDY: CPT

## 2018-09-13 PROCEDURE — 87660 TRICHOMONAS VAGIN DIR PROBE: CPT

## 2018-09-13 PROCEDURE — 88175 CYTOPATH C/V AUTO FLUID REDO: CPT

## 2018-09-13 PROCEDURE — 99396 PREV VISIT EST AGE 40-64: CPT | Mod: S$GLB,,, | Performed by: NURSE PRACTITIONER

## 2018-09-13 NOTE — PROGRESS NOTES
Chief Complaint   Patient presents with    Well Woman       History of Present Illness: Dayo Cortes is a 47 y.o. female that presents today 2018 for well gyn visit.  Pt presents today for well woman exam. ; vaginal. Pt reports one abnormal pap smear in the past, but repeat was normal. Pt reports once montly cycles with no associated complaints. She is not on birth control. Pt is sexually active and would like STD testing; including blood work. Pt is due for mammogram screening in October. Pt c/o an aching pelvic pain that she began noticing yesterday. No other complaints or concerns noted.         Past Medical History:   Diagnosis Date    Abnormal Pap smear     once but repeated pap smear and the resulted WNLs.    Abnormal Pap smear of cervix     early  's became normal       Past Surgical History:   Procedure Laterality Date    COLONOSCOPY N/A 2017    Procedure: COLONOSCOPY;  Surgeon: Win Rodriguez MD;  Location: [facility];  Service: Endoscopy;  Laterality: N/A;    COLONOSCOPY N/A 2017    Performed by Win Rodriguez MD at [facility]       Family History   Problem Relation Age of Onset    Breast cancer Mother 40        unconfirmed.    Diabetes Maternal Grandmother     Ovarian cancer Neg Hx     Colon cancer Neg Hx        Social History     Socioeconomic History    Marital status: Single     Spouse name: None    Number of children: None    Years of education: None    Highest education level: None   Social Needs    Financial resource strain: None    Food insecurity - worry: None    Food insecurity - inability: None    Transportation needs - medical: None    Transportation needs - non-medical: None   Occupational History    None   Tobacco Use    Smoking status: Never Smoker    Smokeless tobacco: Never Used   Substance and Sexual Activity    Alcohol use: Yes     Comment: occasional    Drug use: No    Sexual activity: Yes     Partners: Male     Birth  "control/protection: Condom   Other Topics Concern    None   Social History Narrative    None       OB History    Para Term  AB Living   3 2 2 0 1 2   SAB TAB Ectopic Multiple Live Births   0 1 0 0 2      # Outcome Date GA Lbr Jd/2nd Weight Sex Delivery Anes PTL Lv   3 TAB            2 Term      Vag-Spont   DULCE   1 Term      Vag-Spont   DULCE          Current Outpatient Medications   Medication Sig Dispense Refill    ibuprofen (ADVIL,MOTRIN) 800 MG tablet Take 1 tablet (800 mg total) by mouth 3 (three) times daily. 30 tablet 3    multivitamin capsule Take 1 capsule by mouth once daily.      nystatin (MYCOSTATIN) cream Apply topically 2 (two) times daily. 15 g 0    omeprazole (PRILOSEC) 20 MG capsule TAKE ONE CAPSULE BY MOUTH EVERY DAY 30 capsule 1    SRONYX 0.1-20 mg-mcg per tablet Take 1 tablet by mouth once daily. 28 tablet 11    valACYclovir (VALTREX) 500 MG tablet TAKE ONE TABLET BY MOUTH TWICE DAILY 180 tablet 0     No current facility-administered medications for this visit.        Review of patient's allergies indicates:  No Known Allergies    Review of Symptoms:  GENERAL: Denies weight gain or weight loss. Feeling well overall.   SKIN: Denies rash or lesions.   HEAD: Denies head injury or headache.   ABDOMEN: No abdominal pain, constipation, diarrhea, nausea, vomiting or rectal bleeding.   URINARY: No frequency, dysuria, hematuria, or burning on urination.    /75   Pulse 98   Ht 5' 6" (1.676 m)   Wt 75.7 kg (166 lb 14.2 oz)   LMP 2018     Physical Exam:  APPEARANCE: Well nourished, well developed, in no acute distress.  SKIN: Normal skin turgor, no lesions.  RESPIRATORY: Normal respiratory effort with no retractions or use of accessory muscles  ABDOMEN: Soft. No tenderness or masses. No hepatosplenomegaly. No hernias.  BREASTS: Symmetrical, no skin changes or visible lesions. No palpable masses, nipple discharge or adenopathy bilaterally.  PELVIC: Normal external female " genitalia without lesions. Normal hair distribution. Adequate perineal body, normal urethral meatus. Urethra with no masses.  Bladder nontender. Vagina moist and well rugated without lesions or discharge. Cervix pink and without lesions. No significant cystocele or rectocele. Bimanual exam showed uterus normal size, shape, position, mobile and nontender. Adnexa without masses; + L sided tenderness. Urethra and bladder normal. PAP DONE    ASSESSMENT/PLAN:  Encounter for well woman exam  -     Liquid-based pap smear, screening    Encounter for screening examination for sexually transmitted disease  -     HIV-1 and HIV-2 antibodies; Future; Expected date: 09/13/2018  -     RPR; Future; Expected date: 09/13/2018  -     Vaginosis Screen by DNA Probe  -     C. trachomatis/N. gonorrhoeae by AMP DNA [facility]; Cervix    Screening mammogram, encounter for  -     Mammo Digital Screening Bilat with CAD; Future; Expected date: 09/13/2018    Pelvic pain      -Discussed with pt that the pelvic pain is most likely an ovarian cyst. Instructed pt that if the discomfort persists or worsens she should f/u for an ultrasound. Informed pt that cysts typically take 2-3 menstrual cycles to resolve. She can take 800mg ibuprofen every 8 hours as needed for pain/discomfort. Pt verbalized understanding.     Patient was counseled today on Pap guidelines, recommendation for pelvic exams, mammograms starting annually at age 40, Colonoscopy after the age of 50, Dexa Bone Scan and calcium and vitamin D supplementation in menopause and to see her PCP for other health maintenance.       Follow-up:  RTC if symptoms worsen or do not improve  RTC in 1 year for well woman exam or as needed

## 2018-09-14 ENCOUNTER — PATIENT MESSAGE (OUTPATIENT)
Dept: FAMILY MEDICINE | Facility: CLINIC | Age: 48
End: 2018-09-14

## 2018-09-14 LAB
C TRACH DNA SPEC QL NAA+PROBE: NOT DETECTED
CANDIDA RRNA VAG QL PROBE: NEGATIVE
G VAGINALIS RRNA GENITAL QL PROBE: NEGATIVE
HIV 1+2 AB+HIV1 P24 AG SERPL QL IA: NEGATIVE
N GONORRHOEA DNA SPEC QL NAA+PROBE: NOT DETECTED
RPR SER QL: NORMAL
T VAGINALIS RRNA GENITAL QL PROBE: NEGATIVE

## 2018-09-17 ENCOUNTER — PATIENT MESSAGE (OUTPATIENT)
Dept: FAMILY MEDICINE | Facility: CLINIC | Age: 48
End: 2018-09-17

## 2018-10-31 ENCOUNTER — HOSPITAL ENCOUNTER (OUTPATIENT)
Dept: RADIOLOGY | Facility: CLINIC | Age: 48
Discharge: HOME | End: 2018-10-31
Attending: NURSE PRACTITIONER
Payer: COMMERCIAL

## 2018-10-31 ENCOUNTER — DOCUMENTATION ONLY (OUTPATIENT)
Dept: FAMILY MEDICINE | Facility: CLINIC | Age: 48
End: 2018-10-31

## 2018-10-31 DIAGNOSIS — Z12.31 SCREENING MAMMOGRAM, ENCOUNTER FOR: ICD-10-CM

## 2018-10-31 PROCEDURE — 77063 BREAST TOMOSYNTHESIS BI: CPT | Mod: TC,PO

## 2018-10-31 PROCEDURE — 77067 SCR MAMMO BI INCL CAD: CPT | Mod: 26,,, | Performed by: RADIOLOGY

## 2018-10-31 PROCEDURE — 77063 BREAST TOMOSYNTHESIS BI: CPT | Mod: 26,,, | Performed by: RADIOLOGY

## 2018-10-31 NOTE — PROGRESS NOTES
Pre-Visit Chart Review  For Appointment Scheduled on 11/01/2018    Health Maintenance Due   Topic Date Due    TETANUS VACCINE  10/27/1990    Influenza Vaccine  08/01/2018

## 2018-11-08 ENCOUNTER — DOCUMENTATION ONLY (OUTPATIENT)
Dept: FAMILY MEDICINE | Facility: CLINIC | Age: 48
End: 2018-11-08

## 2018-11-08 NOTE — PROGRESS NOTES
Pre-Visit Chart Review  For Appointment Scheduled on 11/08/2018    Health Maintenance Due   Topic Date Due    TETANUS VACCINE  10/27/1990    Influenza Vaccine  08/01/2018

## 2018-11-27 ENCOUNTER — DOCUMENTATION ONLY (OUTPATIENT)
Dept: FAMILY MEDICINE | Facility: CLINIC | Age: 48
End: 2018-11-27

## 2018-11-27 NOTE — PROGRESS NOTES
Pre-Visit Chart Review  For Appointment Scheduled on 11/29/2018    Health Maintenance Due   Topic Date Due    TETANUS VACCINE  10/27/1990    Influenza Vaccine  08/01/2018

## 2018-12-26 DIAGNOSIS — A60.00 GENITAL HSV: ICD-10-CM

## 2018-12-26 RX ORDER — VALACYCLOVIR HYDROCHLORIDE 500 MG/1
500 TABLET, FILM COATED ORAL 2 TIMES DAILY
Qty: 60 TABLET | Refills: 2 | Status: SHIPPED | OUTPATIENT
Start: 2018-12-26 | End: 2019-04-25 | Stop reason: SDUPTHER

## 2018-12-26 NOTE — TELEPHONE ENCOUNTER
Please schedule the patient for Establishing Appointmentand Labs (SCr/CBC). Note several no shows previous month.   Thanks

## 2018-12-26 NOTE — PROGRESS NOTES
Refill Authorization Note     is requesting a refill authorization.    Brief assessment and rationale for refill: DEFER: Needs annual/Est Care/Needs labs  Amount/Quantity of medication ordered: 90        Refills Authorized: Yes  If authorized number of refills: 0        Medication-related problems identified:   Requires appointment  Requires labs  Medication Therapy Plan: HSV NCO 11/16; Labs WNL; needs new pcp; NTBO (SCr/CBC); defer to you, will queue for 3 more  Name and strength of medication: valACYclovir (VALTREX) 500 MG tablet  How patient will take medication: t1t bid  Medication reconciliation completed: No        Comments:   Lab Results   Component Value Date    WBC 7.40 09/01/2016    HGB 13.0 09/01/2016    HCT 40.0 09/01/2016    MCV 82 09/01/2016     09/01/2016     Lab Results   Component Value Date    CREATININE 1.0 09/01/2016    BUN 14 09/01/2016     09/01/2016    K 3.9 09/01/2016     09/01/2016    CO2 26 09/01/2016

## 2018-12-26 NOTE — TELEPHONE ENCOUNTER
Scheduled pt for est care 04/10/19. Advised she also need her lab done, pt stated that she doesn't need appt and will do a walk-in.

## 2019-03-12 ENCOUNTER — DOCUMENTATION ONLY (OUTPATIENT)
Dept: FAMILY MEDICINE | Facility: CLINIC | Age: 49
End: 2019-03-12

## 2019-03-12 NOTE — PROGRESS NOTES
Pre-Visit Chart Review  For Appointment Scheduled on 3/12/19    Health Maintenance Due   Topic Date Due    TETANUS VACCINE  10/27/1990    Influenza Vaccine  08/01/2018

## 2019-03-13 ENCOUNTER — OFFICE VISIT (OUTPATIENT)
Dept: FAMILY MEDICINE | Facility: CLINIC | Age: 49
End: 2019-03-13
Payer: COMMERCIAL

## 2019-03-13 VITALS
WEIGHT: 169.75 LBS | HEIGHT: 66 IN | BODY MASS INDEX: 27.28 KG/M2 | TEMPERATURE: 98 F | DIASTOLIC BLOOD PRESSURE: 86 MMHG | SYSTOLIC BLOOD PRESSURE: 130 MMHG | HEART RATE: 68 BPM

## 2019-03-13 DIAGNOSIS — Z00.00 HEALTH CARE MAINTENANCE: Primary | ICD-10-CM

## 2019-03-13 DIAGNOSIS — K21.9 GASTROESOPHAGEAL REFLUX DISEASE, ESOPHAGITIS PRESENCE NOT SPECIFIED: ICD-10-CM

## 2019-03-13 PROCEDURE — 99999 PR PBB SHADOW E&M-EST. PATIENT-LVL III: ICD-10-PCS | Mod: PBBFAC,,, | Performed by: FAMILY MEDICINE

## 2019-03-13 PROCEDURE — 99396 PREV VISIT EST AGE 40-64: CPT | Mod: S$GLB,,, | Performed by: FAMILY MEDICINE

## 2019-03-13 PROCEDURE — 99396 PR PREVENTIVE VISIT,EST,40-64: ICD-10-PCS | Mod: S$GLB,,, | Performed by: FAMILY MEDICINE

## 2019-03-13 PROCEDURE — 99999 PR PBB SHADOW E&M-EST. PATIENT-LVL III: CPT | Mod: PBBFAC,,, | Performed by: FAMILY MEDICINE

## 2019-03-13 RX ORDER — PANTOPRAZOLE SODIUM 40 MG/1
40 TABLET, DELAYED RELEASE ORAL DAILY
Qty: 30 TABLET | Refills: 2 | Status: SHIPPED | OUTPATIENT
Start: 2019-03-13 | End: 2020-02-13

## 2019-03-13 NOTE — PATIENT INSTRUCTIONS
GERD (Adult)    Lifestyle Changes for Controlling GERD  When you have GERD, stomach acid feels as if its backing up toward your mouth. Whether or not you take medicine to control your GERD, your symptoms can often be improved with lifestyle changes. Talk to your healthcare provider about the following suggestions. These suggestions may help you get relief from your symptoms.      Raise your head  Reflux is more likely to strike when youre lying down flat, because stomach fluid can flow backward more easily. Raising the head of your bed 4 to 6 inches can help. To do this:  · Slide blocks or books under the legs at the head of your bed. Or, place a wedge under the mattress. Many foam kinkon can make a suitable wedge for you. The wedge should run from your waist to the top of your head.  · Dont just prop your head on several pillows. This increases pressure on your stomach. It can make GERD worse.  Watch your eating habits  Certain foods may increase the acid in your stomach or relax the lower esophageal sphincter. This makes GERD more likely. Its best to avoid the following if they cause you symptoms:  · Coffee, tea, and carbonated drinks (with and without caffeine)  · Fatty, fried, or spicy food  · Mint, chocolate, onions, and tomatoes  · Peppermint  · Any other foods that seem to irritate your stomach or cause you pain  Relieve the pressure  Tips include the following:  · Eat smaller meals, even if you have to eat more often.  · Dont lie down right after you eat. Wait a few hours for your stomach to empty.  · Avoid tight belts and tight-fitting clothes.  · Lose excess weight.  Tobacco and alcohol  Avoid smoking tobacco and drinking alcohol. They can make GERD symptoms worse.  Date Last Reviewed: 7/1/2016  © 3334-2938 [employer]. [address] [place] [address], [place] [address]. All rights reserved. This information is not intended as a substitute for professional medical care. Always follow your  "healthcare professional's instructions.      The esophagus is a tube that carries food from the mouth to the stomach. A valve at the lower end of the esophagus prevents stomach acid from flowing upward. When this valve doesn't work properly, stomach contents may repeatedly flow back up (reflux) into the esophagus. This is called gastroesophageal reflux disease (GERD). GERD can irritate the esophagus. It can cause problems with swallowing or breathing. In severe cases, GERD can cause recurrent pneumonia or other serious problems.  Symptoms of reflux include burning, pressure or sharp pain in the upper abdomen or mid to lower chest. The pain can spread to the neck, back, or shoulder. There may be belching, an acid taste in the back of the throat, chronic cough, or sore throat or hoarseness. GERD symptoms often occur during the day after a big meal. They can also occur at night when lying down.   Home care  Lifestyle changes can help reduce symptoms. If needed, medicines may be prescribed. Symptoms often improve with treatment, but if treatment is stopped, the symptoms often return after a few months. So most persons with GERD will need to continue treatment.  Lifestyle changes  · Limit or avoid fatty, fried, and spicy foods, as well as coffee, chocolate, mint, and foods with high acid content such as tomatoes and citrus fruit and juices (orange, grapefruit, lemon).  · Dont eat large meals, especially at night. Frequent, smaller meals are best. Do not lie down right after eating. And dont eat anything 3 hours before going to bed.  · Avoid drinking alcohol and smoking. As much as possible, stay away from second hand smoke.  · If you are overweight, losing weight will reduce symptoms.   · Avoid wearing tight clothing around your stomach area.  · If your symptoms occur during sleep, use a foam wedge to elevate your upper body (not just your head.) Or, place 4" blocks under the head of your bed.  Medicines  If needed, " medicines can help relieve the symptoms of GERD and prevent damage to the esophagus. Discuss a medicine plan with your healthcare provider. This may include one or more of the following medicines:  · Antacids to help neutralize the normal acids in your stomach.  · Acid blockers (H2 blockers) to decrease acid production.  · Acid inhibitors (PPIs) to decrease acid production in a different way than the blockers. They may work better, but can take a little longer to take effect.  Take an antacid 30-60 minutes after eating and at bedtime, but not at the same time as an acid blocker.  Try not to take medicines such as ibuprofen and aspirin. If you are taking aspirin for your heart or other medical reasons, talk to your healthcare provider about stopping it.  Follow-up care  Follow up with your healthcare provider or as advised by our staff.  When to seek medical advice  Call your healthcare provider if any of the following occur:  · Stomach pain gets worse or moves to the lower right abdomen (appendix area)  · Chest pain appears or gets worse, or spreads to the back, neck, shoulder, or arm  · Frequent vomiting (cant keep down liquids)  · Blood in the stool or vomit (red or black in color)  · Feeling weak or dizzy  · Fever of 100.4ºF (38ºC) or higher, or as directed by your healthcare provider  Date Last Reviewed: 6/23/2015  © 0437-5349 [employer]. [address] [place] [address], [place] [address]. All rights reserved. This information is not intended as a substitute for professional medical care. Always follow your healthcare professional's instructions.

## 2019-03-13 NOTE — PROGRESS NOTES
Subjective:   Patient ID: Dayo Cortes is a 48 y.o. female     Chief Complaint:Establish Care      Patient here for annual checkup.  Patient doing well.  Patient endorses taking Nexium daily for acid reflux.      Review of Systems   Constitutional: Negative for chills and fever.   HENT: Negative for sore throat.    Eyes: Negative for visual disturbance.   Respiratory: Negative for shortness of breath.    Cardiovascular: Negative for chest pain.   Gastrointestinal: Negative for abdominal pain.   Endocrine: Negative for polyuria.   Genitourinary: Negative for dysuria.   Musculoskeletal: Negative for back pain.   Skin: Negative for color change.   Neurological: Negative for headaches.   Psychiatric/Behavioral: Negative for agitation and confusion.     Past Medical History:   Diagnosis Date    Abnormal Pap smear 2006    once but repeated pap smear and the resulted WNLs.    Abnormal Pap smear of cervix     early 30 's became normal     Objective:     Vitals:    03/13/19 1043   BP: 130/86   Pulse: 68   Temp: 97.6 °F (36.4 °C)     Body mass index is 27.4 kg/m².  Physical Exam   Constitutional: No distress.   HENT:   Head: Normocephalic and atraumatic.   Eyes: EOM are normal.   Cardiovascular: Normal rate and normal heart sounds.   Pulmonary/Chest: Effort normal.   Abdominal: Bowel sounds are normal.   Musculoskeletal: Normal range of motion.   Neurological: She is alert.   Psychiatric: She has a normal mood and affect.     Assessment:     1. Health care maintenance    2. Gastroesophageal reflux disease, esophagitis presence not specified      Plan:   Health care maintenance  -     Comprehensive metabolic panel; Future; Expected date: 06/13/2019  -     Lipid panel; Future; Expected date: 03/13/2019    Gastroesophageal reflux disease, esophagitis presence not specified  -     pantoprazole (PROTONIX) 40 MG tablet; Take 1 tablet (40 mg total) by mouth once daily.  Dispense: 30 tablet; Refill: 2  Discussed with patient  treatment for acid reflux.  Advised patient to take Protonix daily for the next 3 months regardless of symptomatology and to then discontinue the medication afterwards.  Patient will then follow up if symptoms recur and can consider starting a non PPI versus further workup for cause of acid reflux.      Time spent with patient: 30 minutes and over half of that time was spent on counseling an coordination of care.    Stoney Stack MD  03/13/2019    Portions of this note have been dictated with ODALYS Coyle

## 2019-03-14 ENCOUNTER — LAB VISIT (OUTPATIENT)
Dept: LAB | Facility: HOSPITAL | Age: 49
End: 2019-03-14
Attending: FAMILY MEDICINE
Payer: COMMERCIAL

## 2019-03-14 DIAGNOSIS — Z00.00 HEALTH CARE MAINTENANCE: ICD-10-CM

## 2019-03-14 LAB
ALBUMIN SERPL BCP-MCNC: 3.5 G/DL
ALP SERPL-CCNC: 63 U/L
ALT SERPL W/O P-5'-P-CCNC: 15 U/L
ANION GAP SERPL CALC-SCNC: 6 MMOL/L
AST SERPL-CCNC: 15 U/L
BILIRUB SERPL-MCNC: 0.4 MG/DL
BUN SERPL-MCNC: 14 MG/DL
CALCIUM SERPL-MCNC: 9.3 MG/DL
CHLORIDE SERPL-SCNC: 108 MMOL/L
CHOLEST SERPL-MCNC: 181 MG/DL
CHOLEST/HDLC SERPL: 2.9 {RATIO}
CO2 SERPL-SCNC: 26 MMOL/L
CREAT SERPL-MCNC: 0.8 MG/DL
EST. GFR  (AFRICAN AMERICAN): >60 ML/MIN/1.73 M^2
EST. GFR  (NON AFRICAN AMERICAN): >60 ML/MIN/1.73 M^2
GLUCOSE SERPL-MCNC: 73 MG/DL
HDLC SERPL-MCNC: 63 MG/DL
HDLC SERPL: 34.8 %
LDLC SERPL CALC-MCNC: 111.2 MG/DL
NONHDLC SERPL-MCNC: 118 MG/DL
POTASSIUM SERPL-SCNC: 4.2 MMOL/L
PROT SERPL-MCNC: 6.8 G/DL
SODIUM SERPL-SCNC: 140 MMOL/L
TRIGL SERPL-MCNC: 34 MG/DL

## 2019-03-14 PROCEDURE — 36415 COLL VENOUS BLD VENIPUNCTURE: CPT | Mod: PO

## 2019-03-14 PROCEDURE — 80053 COMPREHEN METABOLIC PANEL: CPT

## 2019-03-14 PROCEDURE — 80061 LIPID PANEL: CPT

## 2019-04-22 ENCOUNTER — TELEPHONE (OUTPATIENT)
Dept: FAMILY MEDICINE | Facility: CLINIC | Age: 49
End: 2019-04-22

## 2019-04-22 NOTE — TELEPHONE ENCOUNTER
----- Message from Milla Leonard sent at 4/22/2019  1:48 PM CDT -----  Contact: self  Pt would like to be called back regarding having vaginal discomfort and pain and she want to see OB but they have moved to different location and Pt does want to travel that far and would like the nurse to call her back to see if the provider will see her.    Pt can be reached at [phone].

## 2019-04-22 NOTE — TELEPHONE ENCOUNTER
Pt c/o vaginal pain and discomfort for 3 weeks. She stated that she wanted to be seen in our clinic at first but since there's no OB specialist in our clinic, she made appt with Dr. Madison instead. She stated appt is 3 weeks out. I advised pt that if pain and discomfort increases to give us a call back to be evaluated sooner than later. Understanding verbalized.

## 2019-04-24 ENCOUNTER — TELEPHONE (OUTPATIENT)
Dept: FAMILY MEDICINE | Facility: CLINIC | Age: 49
End: 2019-04-24

## 2019-04-24 DIAGNOSIS — A60.00 GENITAL HSV: ICD-10-CM

## 2019-04-24 NOTE — TELEPHONE ENCOUNTER
----- Message from Clarisa Khanna sent at 4/24/2019 12:12 PM CDT -----  Type: Needs Medical Advice    Who Called:  self  Symptoms (please be specific):  About a different dosage on a medication / longer acting   How long has patient had these symptoms:   Best Call Back Number: [phone]   Additional Information:

## 2019-04-24 NOTE — TELEPHONE ENCOUNTER
Patient takes valacyclovir 500 mg 2 times a day as need it but she wants if she can have this in regular basis like a depression medicine. Please advise.

## 2019-04-25 RX ORDER — VALACYCLOVIR HYDROCHLORIDE 500 MG/1
500 TABLET, FILM COATED ORAL 2 TIMES DAILY
Qty: 180 TABLET | Refills: 3 | Status: SHIPPED | OUTPATIENT
Start: 2019-04-25 | End: 2020-05-21

## 2019-04-25 NOTE — TELEPHONE ENCOUNTER
Yes I have called this in.  This is the suppression does to help limit the amount of outbreaks.  If the patient has less than 10 out breaks a year she can reduce her dose to 500 mg once daily.

## 2019-06-18 ENCOUNTER — OFFICE VISIT (OUTPATIENT)
Dept: FAMILY MEDICINE | Facility: CLINIC | Age: 49
End: 2019-06-18
Payer: COMMERCIAL

## 2019-06-18 ENCOUNTER — LAB VISIT (OUTPATIENT)
Dept: LAB | Facility: HOSPITAL | Age: 49
End: 2019-06-18
Attending: NURSE PRACTITIONER
Payer: COMMERCIAL

## 2019-06-18 VITALS
TEMPERATURE: 99 F | HEIGHT: 66 IN | OXYGEN SATURATION: 99 % | DIASTOLIC BLOOD PRESSURE: 88 MMHG | WEIGHT: 163.81 LBS | HEART RATE: 85 BPM | SYSTOLIC BLOOD PRESSURE: 116 MMHG | BODY MASS INDEX: 26.33 KG/M2

## 2019-06-18 DIAGNOSIS — K21.9 GASTROESOPHAGEAL REFLUX DISEASE, ESOPHAGITIS PRESENCE NOT SPECIFIED: ICD-10-CM

## 2019-06-18 DIAGNOSIS — R22.1 LUMP IN THROAT: ICD-10-CM

## 2019-06-18 DIAGNOSIS — R13.10 DYSPHAGIA, UNSPECIFIED TYPE: Primary | ICD-10-CM

## 2019-06-18 DIAGNOSIS — R13.10 DYSPHAGIA, UNSPECIFIED TYPE: ICD-10-CM

## 2019-06-18 DIAGNOSIS — E66.3 OVERWEIGHT (BMI 25.0-29.9): ICD-10-CM

## 2019-06-18 PROCEDURE — 3008F BODY MASS INDEX DOCD: CPT | Mod: CPTII,S$GLB,, | Performed by: NURSE PRACTITIONER

## 2019-06-18 PROCEDURE — 99999 PR PBB SHADOW E&M-EST. PATIENT-LVL IV: ICD-10-PCS | Mod: PBBFAC,,, | Performed by: NURSE PRACTITIONER

## 2019-06-18 PROCEDURE — 99214 PR OFFICE/OUTPT VISIT, EST, LEVL IV, 30-39 MIN: ICD-10-PCS | Mod: S$GLB,,, | Performed by: NURSE PRACTITIONER

## 2019-06-18 PROCEDURE — 84443 ASSAY THYROID STIM HORMONE: CPT

## 2019-06-18 PROCEDURE — 36415 COLL VENOUS BLD VENIPUNCTURE: CPT | Mod: PO

## 2019-06-18 PROCEDURE — 99999 PR PBB SHADOW E&M-EST. PATIENT-LVL IV: CPT | Mod: PBBFAC,,, | Performed by: NURSE PRACTITIONER

## 2019-06-18 PROCEDURE — 99214 OFFICE O/P EST MOD 30 MIN: CPT | Mod: S$GLB,,, | Performed by: NURSE PRACTITIONER

## 2019-06-18 PROCEDURE — 3008F PR BODY MASS INDEX (BMI) DOCUMENTED: ICD-10-PCS | Mod: CPTII,S$GLB,, | Performed by: NURSE PRACTITIONER

## 2019-06-18 PROCEDURE — 84439 ASSAY OF FREE THYROXINE: CPT

## 2019-06-18 NOTE — PROGRESS NOTES
Subjective:       Patient ID: Dayo Cortes is a 48 y.o. female.    Chief Complaint: throat problem (feels like there is a lump in throat)    Mass   This is a new (Throat) problem. The current episode started yesterday. The problem occurs constantly. The problem has been unchanged. Pertinent negatives include no chest pain, congestion, coughing, headaches, neck pain, rash or vomiting. Associated symptoms comments: Trouble swallowing. The symptoms are aggravated by eating. She has tried nothing for the symptoms.       Past Medical History:   Diagnosis Date    Abnormal Pap smear 2006    once but repeated pap smear and the resulted WNLs.    Abnormal Pap smear of cervix     early 30 's became normal       Review of patient's allergies indicates:  No Known Allergies      Current Outpatient Medications:     ibuprofen (ADVIL,MOTRIN) 800 MG tablet, Take 1 tablet (800 mg total) by mouth 3 (three) times daily., Disp: 30 tablet, Rfl: 3    multivitamin capsule, Take 1 capsule by mouth once daily., Disp: , Rfl:     nystatin (MYCOSTATIN) cream, Apply topically 2 (two) times daily., Disp: 15 g, Rfl: 0    ranitidine (ZANTAC) 150 MG capsule, Take 150 mg by mouth nightly., Disp: , Rfl:     valACYclovir (VALTREX) 500 MG tablet, Take 1 tablet (500 mg total) by mouth 2 (two) times daily., Disp: 180 tablet, Rfl: 3    pantoprazole (PROTONIX) 40 MG tablet, Take 1 tablet (40 mg total) by mouth once daily., Disp: 30 tablet, Rfl: 2    Review of Systems   Constitutional: Negative for unexpected weight change.   HENT: Negative for congestion.    Eyes: Negative for visual disturbance.   Respiratory: Negative for cough and shortness of breath.    Cardiovascular: Negative for chest pain, palpitations and leg swelling.   Gastrointestinal: Negative for blood in stool and vomiting.   Genitourinary: Negative for hematuria.   Musculoskeletal: Negative for neck pain.   Skin: Negative for rash.   Allergic/Immunologic: Negative for  "immunocompromised state.   Neurological: Negative for headaches.   Hematological: Does not bruise/bleed easily.   Psychiatric/Behavioral: Negative for agitation. The patient is not nervous/anxious.        Objective:      /88 (BP Location: Right arm, Patient Position: Sitting, BP Method: Medium (Manual))   Pulse 85   Temp 99 °F (37.2 °C) (Oral)   Ht 5' 6" (1.676 m)   Wt 74.3 kg (163 lb 12.8 oz)   SpO2 99%   BMI 26.44 kg/m²   Physical Exam   Constitutional: She is oriented to person, place, and time. She appears well-developed and well-nourished.   Eyes: Pupils are equal, round, and reactive to light. EOM are normal.   Neck: Normal range of motion.   Cardiovascular: Normal rate, regular rhythm and normal heart sounds.   Pulmonary/Chest: Effort normal and breath sounds normal.   Abdominal: Soft. Bowel sounds are normal.   Musculoskeletal: Normal range of motion.   Neurological: She is alert and oriented to person, place, and time.   Skin: Skin is warm and dry.   Psychiatric: She has a normal mood and affect. Her behavior is normal. Judgment and thought content normal.       Assessment:       1. Dysphagia, unspecified type    2. Lump in throat    3. Gastroesophageal reflux disease, esophagitis presence not specified    4. Overweight (BMI 25.0-29.9)        Plan:       Dysphagia, unspecified type  -     US Soft Tissue Head Neck Thyroid; Future; Expected date: 06/18/2019  -     TSH; Future; Expected date: 06/18/2019  -     T4, free; Future; Expected date: 06/18/2019  -     Ambulatory referral to Gastroenterology    Lump in throat  -     US Soft Tissue Head Neck Thyroid; Future; Expected date: 06/18/2019  -     Ambulatory referral to Gastroenterology    Gastroesophageal reflux disease, esophagitis presence not specified  -     Ambulatory referral to Gastroenterology    Overweight (BMI 25.0-29.9)  Counseled patient on his ideal body weight, health consequences of being obese and current recommendations including " "weekly exercise and a heart healthy diet.  Current BMI is:Estimated body mass index is 26.44 kg/m² as calculated from the following:    Height as of this encounter: 5' 6" (1.676 m).    Weight as of this encounter: 74.3 kg (163 lb 12.8 oz)..  Patient is aware that ideal BMI < 25 or Weight in (lb) to have BMI = 25: 154.6.          Time spent with patient: 30 minutes    Patient with be reevaluated in 6 weeks or sooner prn    Greater than 50% of this visit was spent counseling as described in above documentation:Yes  "

## 2019-06-19 ENCOUNTER — HOSPITAL ENCOUNTER (OUTPATIENT)
Dept: RADIOLOGY | Facility: CLINIC | Age: 49
Discharge: HOME | End: 2019-06-19
Attending: NURSE PRACTITIONER
Payer: COMMERCIAL

## 2019-06-19 DIAGNOSIS — R13.10 DYSPHAGIA, UNSPECIFIED TYPE: ICD-10-CM

## 2019-06-19 LAB
T4 FREE SERPL-MCNC: 1.14 NG/DL (ref 0.71–1.51)
TSH SERPL DL<=0.005 MIU/L-ACNC: 0.41 UIU/ML (ref 0.4–4)

## 2019-06-19 PROCEDURE — 76536 US SOFT TISSUE HEAD NECK THYROID: ICD-10-PCS | Mod: 26,,, | Performed by: RADIOLOGY

## 2019-06-19 PROCEDURE — 76536 US EXAM OF HEAD AND NECK: CPT | Mod: TC,PO

## 2019-06-19 PROCEDURE — 76536 US EXAM OF HEAD AND NECK: CPT | Mod: 26,,, | Performed by: RADIOLOGY

## 2019-12-05 ENCOUNTER — TELEPHONE (OUTPATIENT)
Dept: FAMILY MEDICINE | Facility: CLINIC | Age: 49
End: 2019-12-05

## 2019-12-05 NOTE — TELEPHONE ENCOUNTER
Pt stated she's been having vaginal discharge with some odor. Concerning and would like to check for STD/swabs. Scheduled with STUART Garay on 12/11/19 at 840am.

## 2019-12-05 NOTE — TELEPHONE ENCOUNTER
----- Message from Cristal Graves sent at 12/5/2019  2:29 PM CST -----  Contact: levar  Type: Needs Medical Advice    Who Called:  Patient  Contact [phone]  Additional Information: patient wants to know if the doctor or NP can preform a detailed STD testing. Please give call back

## 2019-12-10 ENCOUNTER — DOCUMENTATION ONLY (OUTPATIENT)
Dept: FAMILY MEDICINE | Facility: CLINIC | Age: 49
End: 2019-12-10

## 2019-12-10 NOTE — PROGRESS NOTES
Pre-Visit Chart Review  For Appointment Scheduled on (12/11/19)    Health Maintenance Due   Topic Date Due    TETANUS VACCINE  10[birth year]27/1988

## 2019-12-11 ENCOUNTER — OFFICE VISIT (OUTPATIENT)
Dept: FAMILY MEDICINE | Facility: CLINIC | Age: 49
End: 2019-12-11
Payer: COMMERCIAL

## 2019-12-11 VITALS
HEIGHT: 66 IN | TEMPERATURE: 99 F | DIASTOLIC BLOOD PRESSURE: 82 MMHG | BODY MASS INDEX: 24.77 KG/M2 | SYSTOLIC BLOOD PRESSURE: 118 MMHG | WEIGHT: 154.13 LBS | OXYGEN SATURATION: 98 % | HEART RATE: 100 BPM

## 2019-12-11 DIAGNOSIS — N76.0 VAGINOSIS: Primary | ICD-10-CM

## 2019-12-11 DIAGNOSIS — R82.90 ABNORMAL URINE: ICD-10-CM

## 2019-12-11 LAB
BACTERIA #/AREA URNS AUTO: ABNORMAL /HPF
BILIRUB SERPL-MCNC: NEGATIVE MG/DL
BILIRUB UR QL STRIP: NEGATIVE
BLOOD, POC UA: NEGATIVE
CLARITY UR REFRACT.AUTO: ABNORMAL
COLOR UR AUTO: YELLOW
GLUCOSE UR QL STRIP: NEGATIVE
GLUCOSE UR QL STRIP: NORMAL
HGB UR QL STRIP: NEGATIVE
KETONES UR QL STRIP: NEGATIVE
KETONES UR QL STRIP: NEGATIVE
LEUKOCYTE ESTERASE UR QL STRIP: ABNORMAL
LEUKOCYTE ESTERASE URINE, POC: ABNORMAL
MICROSCOPIC COMMENT: ABNORMAL
NITRITE UR QL STRIP: NEGATIVE
NITRITE, POC UA: NEGATIVE
PH UR STRIP: 5 [PH] (ref 5–8)
PH, POC UA: 5
PROT UR QL STRIP: NEGATIVE
PROTEIN, POC: ABNORMAL
RBC #/AREA URNS AUTO: 2 /HPF (ref 0–4)
SP GR UR STRIP: 1.02 (ref 1–1.03)
SPECIFIC GRAVITY, POC UA: 1.02
SQUAMOUS #/AREA URNS AUTO: 1 /HPF
URN SPEC COLLECT METH UR: ABNORMAL
UROBILINOGEN, POC UA: NORMAL
WBC #/AREA URNS AUTO: 8 /HPF (ref 0–5)

## 2019-12-11 PROCEDURE — 87491 CHLMYD TRACH DNA AMP PROBE: CPT | Mod: 59

## 2019-12-11 PROCEDURE — 81003 POCT URINALYSIS: ICD-10-PCS | Mod: QW,S$GLB,, | Performed by: PHYSICIAN ASSISTANT

## 2019-12-11 PROCEDURE — 87481 CANDIDA DNA AMP PROBE: CPT | Mod: 59

## 2019-12-11 PROCEDURE — 99213 OFFICE O/P EST LOW 20 MIN: CPT | Mod: 25,S$GLB,, | Performed by: PHYSICIAN ASSISTANT

## 2019-12-11 PROCEDURE — 3008F BODY MASS INDEX DOCD: CPT | Mod: CPTII,S$GLB,, | Performed by: PHYSICIAN ASSISTANT

## 2019-12-11 PROCEDURE — 81001 URINALYSIS AUTO W/SCOPE: CPT

## 2019-12-11 PROCEDURE — 87086 URINE CULTURE/COLONY COUNT: CPT

## 2019-12-11 PROCEDURE — 99213 PR OFFICE/OUTPT VISIT, EST, LEVL III, 20-29 MIN: ICD-10-PCS | Mod: 25,S$GLB,, | Performed by: PHYSICIAN ASSISTANT

## 2019-12-11 PROCEDURE — 87801 DETECT AGNT MULT DNA AMPLI: CPT

## 2019-12-11 PROCEDURE — 99999 PR PBB SHADOW E&M-EST. PATIENT-LVL III: ICD-10-PCS | Mod: PBBFAC,,, | Performed by: PHYSICIAN ASSISTANT

## 2019-12-11 PROCEDURE — 99999 PR PBB SHADOW E&M-EST. PATIENT-LVL III: CPT | Mod: PBBFAC,,, | Performed by: PHYSICIAN ASSISTANT

## 2019-12-11 PROCEDURE — 3008F PR BODY MASS INDEX (BMI) DOCUMENTED: ICD-10-PCS | Mod: CPTII,S$GLB,, | Performed by: PHYSICIAN ASSISTANT

## 2019-12-11 PROCEDURE — 81003 URINALYSIS AUTO W/O SCOPE: CPT | Mod: QW,S$GLB,, | Performed by: PHYSICIAN ASSISTANT

## 2019-12-11 RX ORDER — TINIDAZOLE 500 MG/1
1000 TABLET ORAL
Qty: 10 TABLET | Refills: 0 | Status: SHIPPED | OUTPATIENT
Start: 2019-12-11 | End: 2019-12-16

## 2019-12-11 NOTE — PROGRESS NOTES
Subjective:       Patient ID: Dayo Cortes is a 49 y.o. female.    Chief Complaint: Std Screening (Lower back pain, urine odor (2 weeks))    Female  Problem   The patient's primary symptoms include a genital odor. The patient's pertinent negatives include no genital itching, genital lesions, genital rash, pelvic pain or vaginal discharge. This is a new problem. The current episode started in the past 7 days. The problem occurs constantly. The problem has been unchanged. The patient is experiencing no pain. She is not pregnant. Associated symptoms include back pain (now resolved however). Pertinent negatives include no abdominal pain, discolored urine, dysuria, flank pain, frequency, hematuria, nausea, painful intercourse or urgency. She has tried nothing for the symptoms. She is sexually active. No, her partner does not have an STD. She uses hysterectomy for contraception. She is postmenopausal. Her past medical history is significant for vaginosis (BV ).     Review of Systems   Gastrointestinal: Negative for abdominal pain and nausea.   Genitourinary: Negative for dysuria, flank pain, frequency, hematuria, pelvic pain, urgency and vaginal discharge.   Musculoskeletal: Positive for back pain (now resolved however).       Objective:      Physical Exam   Constitutional: She appears well-developed and well-nourished. No distress.   Genitourinary: Pelvic exam was performed with patient supine. There is no rash, tenderness or lesion on the right labia. There is no rash, tenderness or lesion on the left labia. No erythema or bleeding in the vagina. Vaginal discharge found.   Vitals reviewed.      Assessment:       1. Vaginosis        Plan:       Dayo was seen today for std screening.    Diagnoses and all orders for this visit:    Vaginosis  -     Vaginosis Screen by DNA Probe  -     C. trachomatis/N. gonorrhoeae by AMP DNA  -     POCT URINALYSIS  -     tinidazole (TINDAMAX) 500 MG tablet; Take 2 tablets (1,000 mg  total) by mouth daily with breakfast. for 5 days    Further recommendations will be made based on results          No follow-ups on file.

## 2019-12-12 ENCOUNTER — OFFICE VISIT (OUTPATIENT)
Dept: FAMILY MEDICINE | Facility: CLINIC | Age: 49
End: 2019-12-12
Payer: COMMERCIAL

## 2019-12-12 VITALS
DIASTOLIC BLOOD PRESSURE: 80 MMHG | HEIGHT: 66 IN | HEART RATE: 80 BPM | SYSTOLIC BLOOD PRESSURE: 110 MMHG | BODY MASS INDEX: 24.87 KG/M2 | OXYGEN SATURATION: 99 % | WEIGHT: 154.75 LBS | TEMPERATURE: 99 F

## 2019-12-12 DIAGNOSIS — K21.9 GASTROESOPHAGEAL REFLUX DISEASE, ESOPHAGITIS PRESENCE NOT SPECIFIED: ICD-10-CM

## 2019-12-12 DIAGNOSIS — S60.456A FOREIGN BODY OF RIGHT LITTLE FINGER: Primary | ICD-10-CM

## 2019-12-12 LAB
BACTERIA UR CULT: NO GROWTH
BACTERIAL VAGINOSIS DNA: NEGATIVE
C TRACH DNA SPEC QL NAA+PROBE: NOT DETECTED
CANDIDA GLABRATA DNA: NEGATIVE
CANDIDA KRUSEI DNA: NEGATIVE
CANDIDA RRNA VAG QL PROBE: NEGATIVE
N GONORRHOEA DNA SPEC QL NAA+PROBE: NOT DETECTED
T VAGINALIS RRNA GENITAL QL PROBE: NEGATIVE

## 2019-12-12 PROCEDURE — 99214 OFFICE O/P EST MOD 30 MIN: CPT | Mod: 25,S$GLB,, | Performed by: NURSE PRACTITIONER

## 2019-12-12 PROCEDURE — 10120 INC&RMVL FB SUBQ TISS SMPL: CPT | Mod: S$GLB,,, | Performed by: NURSE PRACTITIONER

## 2019-12-12 PROCEDURE — 99999 PR PBB SHADOW E&M-EST. PATIENT-LVL III: CPT | Mod: PBBFAC,,, | Performed by: NURSE PRACTITIONER

## 2019-12-12 PROCEDURE — 99999 PR PBB SHADOW E&M-EST. PATIENT-LVL III: ICD-10-PCS | Mod: PBBFAC,,, | Performed by: NURSE PRACTITIONER

## 2019-12-12 PROCEDURE — 10120 PR REMOVE FOREIGN BODY SIMPLE: ICD-10-PCS | Mod: S$GLB,,, | Performed by: NURSE PRACTITIONER

## 2019-12-12 PROCEDURE — 99214 PR OFFICE/OUTPT VISIT, EST, LEVL IV, 30-39 MIN: ICD-10-PCS | Mod: 25,S$GLB,, | Performed by: NURSE PRACTITIONER

## 2019-12-12 RX ORDER — MUPIROCIN 20 MG/G
OINTMENT TOPICAL 3 TIMES DAILY
Qty: 1 TUBE | Refills: 1 | Status: SHIPPED | OUTPATIENT
Start: 2019-12-12 | End: 2020-08-10

## 2019-12-12 NOTE — PROGRESS NOTES
Subjective:       Patient ID: Dayo Cortes is a 49 y.o. female.    Chief Complaint: Foreign Body (right pinky finger (splinter))    Foreign Body   The incident occurred more than 1 week ago. Suspected object: splinter. Intake: touching. The incident was reported. The incident was witnessed/reported by the patient. Pertinent negatives include no chest pain, congestion, cough, drooling, sore throat, trouble swallowing or wheezing. Associated symptoms comments: Tenderness in the right little finger.       Past Medical History:   Diagnosis Date    Abnormal Pap smear 2006    once but repeated pap smear and the resulted WNLs.    Abnormal Pap smear of cervix     early 30 's became normal       Review of patient's allergies indicates:  No Known Allergies      Current Outpatient Medications:     ibuprofen (ADVIL,MOTRIN) 800 MG tablet, Take 1 tablet (800 mg total) by mouth 3 (three) times daily., Disp: 30 tablet, Rfl: 3    multivitamin capsule, Take 1 capsule by mouth once daily., Disp: , Rfl:     mupirocin (BACTROBAN) 2 % ointment, Apply topically 3 (three) times daily., Disp: 1 Tube, Rfl: 1    nystatin (MYCOSTATIN) cream, Apply topically 2 (two) times daily. (Patient not taking: Reported on 12/11/2019), Disp: 15 g, Rfl: 0    pantoprazole (PROTONIX) 40 MG tablet, Take 1 tablet (40 mg total) by mouth once daily. (Patient not taking: Reported on 12/11/2019), Disp: 30 tablet, Rfl: 2    ranitidine (ZANTAC) 150 MG capsule, Take 150 mg by mouth nightly., Disp: , Rfl:     valACYclovir (VALTREX) 500 MG tablet, Take 1 tablet (500 mg total) by mouth 2 (two) times daily., Disp: 180 tablet, Rfl: 3    Review of Systems   Constitutional: Negative for unexpected weight change.   HENT: Negative for congestion, drooling, sore throat and trouble swallowing.    Eyes: Negative for visual disturbance.   Respiratory: Negative for cough, shortness of breath and wheezing.    Cardiovascular: Negative for chest pain, palpitations and leg  "swelling.   Gastrointestinal: Negative for blood in stool.   Genitourinary: Negative for hematuria.   Musculoskeletal: Positive for arthralgias (right little finger).   Skin: Negative for rash.   Allergic/Immunologic: Negative for immunocompromised state.   Neurological: Negative for headaches.   Hematological: Does not bruise/bleed easily.   Psychiatric/Behavioral: Negative for agitation. The patient is not nervous/anxious.        Objective:      /80 (BP Location: Right arm, Patient Position: Sitting, BP Method: Large (Manual))   Pulse 80   Temp 98.6 °F (37 °C) (Oral)   Ht 5' 6" (1.676 m)   Wt 70.2 kg (154 lb 12.2 oz)   SpO2 99%   BMI 24.98 kg/m²   Physical Exam   Constitutional: She is oriented to person, place, and time. She appears well-developed and well-nourished.   Eyes: Pupils are equal, round, and reactive to light. EOM are normal.   Neck: Normal range of motion.   Cardiovascular: Normal rate, regular rhythm and normal heart sounds.   Pulmonary/Chest: Effort normal and breath sounds normal.   Abdominal: Soft. Bowel sounds are normal.   Musculoskeletal: Normal range of motion.        Arms:  Neurological: She is alert and oriented to person, place, and time.   Skin: Skin is warm and dry.   Right little finger hard callus tenderness on paplation   Psychiatric: She has a normal mood and affect. Her behavior is normal. Judgment and thought content normal.       Assessment:       1. Foreign body of right little finger    2. Gastroesophageal reflux disease, esophagitis presence not specified    3. BMI 24.0-24.9, adult        Plan:       Foreign body of right little finger  -     mupirocin (BACTROBAN) 2 % ointment; Apply topically 3 (three) times daily.  Dispense: 1 Tube; Refill: 1  Risks (including but not limited to pain, bleeding, damage to structures, need for further procedure, poor cosmetic result), alternatives and benefits were discussed.   written consent was obtained.  Timeout was performed " using two patient identifiers, right procedure and right site.  Area was prepped with betadine prep.  Local anesthesia was used with 2 ml of 1% lidocaine .  Stab incision was made with an 18 gauge needle using the usual technique.  Wound was probed and no visible foreign body. no mls of purulent discharge expressed.  Sample was sent to pathology: No.  Wound was covered with band-aide.  Hemostasis was obtained with direct pressure and bandage.  Post-procedure pain was 0/10.  Discussed wound care, packing and dressing change.  Patient expressed verbal understanding and all questions were answered.    Gastroesophageal reflux disease, esophagitis presence not specified  Stable, continue medication  BMI 24.0-24.9, adult  Healthy weight in patient      Time spent with patient: 30 minutes    Patient with be reevaluated in 1 year or sooner prn    Greater than 50% of this visit was spent counseling as described in above documentation:Yes

## 2019-12-13 DIAGNOSIS — R82.90 ABNORMAL URINE FINDINGS: Primary | ICD-10-CM

## 2019-12-17 ENCOUNTER — LAB VISIT (OUTPATIENT)
Dept: LAB | Facility: HOSPITAL | Age: 49
End: 2019-12-17
Attending: PHYSICIAN ASSISTANT
Payer: COMMERCIAL

## 2019-12-17 DIAGNOSIS — R82.90 ABNORMAL URINE FINDINGS: ICD-10-CM

## 2019-12-17 LAB
BACTERIA #/AREA URNS AUTO: NORMAL /HPF
BILIRUB UR QL STRIP: NEGATIVE
CLARITY UR REFRACT.AUTO: ABNORMAL
COLOR UR AUTO: YELLOW
GLUCOSE UR QL STRIP: NEGATIVE
HGB UR QL STRIP: NEGATIVE
KETONES UR QL STRIP: NEGATIVE
LEUKOCYTE ESTERASE UR QL STRIP: ABNORMAL
MICROSCOPIC COMMENT: NORMAL
NITRITE UR QL STRIP: NEGATIVE
PH UR STRIP: 7 [PH] (ref 5–8)
PROT UR QL STRIP: NEGATIVE
SP GR UR STRIP: 1.02 (ref 1–1.03)
SQUAMOUS #/AREA URNS AUTO: 1 /HPF
URN SPEC COLLECT METH UR: ABNORMAL
WBC #/AREA URNS AUTO: 2 /HPF (ref 0–5)

## 2019-12-17 PROCEDURE — 81001 URINALYSIS AUTO W/SCOPE: CPT

## 2020-01-03 ENCOUNTER — TELEPHONE (OUTPATIENT)
Dept: FAMILY MEDICINE | Facility: CLINIC | Age: 50
End: 2020-01-03

## 2020-01-03 DIAGNOSIS — Z12.39 SCREENING FOR BREAST CANCER: Primary | ICD-10-CM

## 2020-01-03 NOTE — TELEPHONE ENCOUNTER
----- Message from Alvina Jimenez sent at 1/3/2020  8:25 AM CST -----  Contact: Patient  Type: Needs Medical Advice    Who Called:  Patient  Best Call Back Number:   Additional Information: Calling to request a Mammogram order to be put in the system. It is time for her annual

## 2020-01-08 ENCOUNTER — HOSPITAL ENCOUNTER (OUTPATIENT)
Dept: RADIOLOGY | Facility: CLINIC | Age: 50
Discharge: HOME | End: 2020-01-08
Attending: FAMILY MEDICINE
Payer: COMMERCIAL

## 2020-01-08 DIAGNOSIS — Z12.39 SCREENING FOR BREAST CANCER: ICD-10-CM

## 2020-01-08 PROCEDURE — 77067 SCR MAMMO BI INCL CAD: CPT | Mod: TC,PO

## 2020-01-08 PROCEDURE — 77067 MAMMO DIGITAL SCREENING BILAT WITH TOMOSYNTHESIS_CAD: ICD-10-PCS | Mod: 26,,, | Performed by: RADIOLOGY

## 2020-01-08 PROCEDURE — 77063 MAMMO DIGITAL SCREENING BILAT WITH TOMOSYNTHESIS_CAD: ICD-10-PCS | Mod: 26,,, | Performed by: RADIOLOGY

## 2020-01-08 PROCEDURE — 77063 BREAST TOMOSYNTHESIS BI: CPT | Mod: 26,,, | Performed by: RADIOLOGY

## 2020-01-08 PROCEDURE — 77067 SCR MAMMO BI INCL CAD: CPT | Mod: 26,,, | Performed by: RADIOLOGY

## 2020-01-22 ENCOUNTER — DOCUMENTATION ONLY (OUTPATIENT)
Dept: FAMILY MEDICINE | Facility: CLINIC | Age: 50
End: 2020-01-22

## 2020-01-22 NOTE — PROGRESS NOTES
Pre-Visit Chart Review  For Appointment Scheduled on 1/23/2020    Health Maintenance Due   Topic Date Due    TETANUS VACCINE  10/27/1990

## 2020-02-13 ENCOUNTER — TELEPHONE (OUTPATIENT)
Dept: FAMILY MEDICINE | Facility: CLINIC | Age: 50
End: 2020-02-13

## 2020-02-13 ENCOUNTER — NURSE TRIAGE (OUTPATIENT)
Dept: ADMINISTRATIVE | Facility: CLINIC | Age: 50
End: 2020-02-13

## 2020-02-13 ENCOUNTER — HOSPITAL ENCOUNTER (EMERGENCY)
Facility: HOSPITAL | Age: 50
Discharge: HOME | End: 2020-02-13
Attending: EMERGENCY MEDICINE
Payer: COMMERCIAL

## 2020-02-13 VITALS
OXYGEN SATURATION: 99 % | TEMPERATURE: 98 F | DIASTOLIC BLOOD PRESSURE: 92 MMHG | RESPIRATION RATE: 16 BRPM | HEIGHT: 67 IN | HEART RATE: 85 BPM | WEIGHT: 149 LBS | BODY MASS INDEX: 23.39 KG/M2 | SYSTOLIC BLOOD PRESSURE: 142 MMHG

## 2020-02-13 DIAGNOSIS — R07.9 CHEST PAIN: ICD-10-CM

## 2020-02-13 DIAGNOSIS — R07.89 CHEST WALL PAIN: Primary | ICD-10-CM

## 2020-02-13 LAB
ALBUMIN SERPL BCP-MCNC: 4.2 G/DL (ref 3.5–5.2)
ALP SERPL-CCNC: 59 U/L (ref 55–135)
ALT SERPL W/O P-5'-P-CCNC: 20 U/L (ref 10–44)
ANION GAP SERPL CALC-SCNC: 7 MMOL/L (ref 8–16)
AST SERPL-CCNC: 21 U/L (ref 10–40)
BASOPHILS # BLD AUTO: 0.03 K/UL (ref 0–0.2)
BASOPHILS NFR BLD: 0.5 % (ref 0–1.9)
BILIRUB SERPL-MCNC: 1 MG/DL (ref 0.1–1)
BUN SERPL-MCNC: 14 MG/DL (ref 6–20)
CALCIUM SERPL-MCNC: 9.4 MG/DL (ref 8.7–10.5)
CHLORIDE SERPL-SCNC: 105 MMOL/L (ref 95–110)
CO2 SERPL-SCNC: 28 MMOL/L (ref 23–29)
CREAT SERPL-MCNC: 0.8 MG/DL (ref 0.5–1.4)
D DIMER PPP IA.FEU-MCNC: <0.27 UG/ML FEU
DIFFERENTIAL METHOD: ABNORMAL
EOSINOPHIL # BLD AUTO: 0.2 K/UL (ref 0–0.5)
EOSINOPHIL NFR BLD: 3.2 % (ref 0–8)
ERYTHROCYTE [DISTWIDTH] IN BLOOD BY AUTOMATED COUNT: 13.7 % (ref 11.5–14.5)
EST. GFR  (AFRICAN AMERICAN): >60 ML/MIN/1.73 M^2
EST. GFR  (NON AFRICAN AMERICAN): >60 ML/MIN/1.73 M^2
GLUCOSE SERPL-MCNC: 94 MG/DL (ref 70–110)
HCT VFR BLD AUTO: 39.7 % (ref 37–48.5)
HGB BLD-MCNC: 12.4 G/DL (ref 12–16)
IMM GRANULOCYTES # BLD AUTO: 0.01 K/UL (ref 0–0.04)
IMM GRANULOCYTES NFR BLD AUTO: 0.2 % (ref 0–0.5)
LYMPHOCYTES # BLD AUTO: 2.6 K/UL (ref 1–4.8)
LYMPHOCYTES NFR BLD: 42.1 % (ref 18–48)
MCH RBC QN AUTO: 26.4 PG (ref 27–31)
MCHC RBC AUTO-ENTMCNC: 31.2 G/DL (ref 32–36)
MCV RBC AUTO: 85 FL (ref 82–98)
MONOCYTES # BLD AUTO: 0.5 K/UL (ref 0.3–1)
MONOCYTES NFR BLD: 7.7 % (ref 4–15)
NEUTROPHILS # BLD AUTO: 2.9 K/UL (ref 1.8–7.7)
NEUTROPHILS NFR BLD: 46.3 % (ref 38–73)
NRBC BLD-RTO: 0 /100 WBC
PLATELET # BLD AUTO: 229 K/UL (ref 150–350)
PMV BLD AUTO: 9.8 FL (ref 9.2–12.9)
POTASSIUM SERPL-SCNC: 4.3 MMOL/L (ref 3.5–5.1)
PROT SERPL-MCNC: 7.3 G/DL (ref 6–8.4)
RBC # BLD AUTO: 4.69 M/UL (ref 4–5.4)
SODIUM SERPL-SCNC: 140 MMOL/L (ref 136–145)
TROPONIN I SERPL DL<=0.01 NG/ML-MCNC: <0.03 NG/ML
WBC # BLD AUTO: 6.25 K/UL (ref 3.9–12.7)

## 2020-02-13 PROCEDURE — 85025 COMPLETE CBC W/AUTO DIFF WBC: CPT

## 2020-02-13 PROCEDURE — 84484 ASSAY OF TROPONIN QUANT: CPT

## 2020-02-13 PROCEDURE — 85379 FIBRIN DEGRADATION QUANT: CPT

## 2020-02-13 PROCEDURE — 99285 EMERGENCY DEPT VISIT HI MDM: CPT | Mod: 25

## 2020-02-13 PROCEDURE — 36415 COLL VENOUS BLD VENIPUNCTURE: CPT

## 2020-02-13 PROCEDURE — 25000003 PHARM REV CODE 250: Performed by: EMERGENCY MEDICINE

## 2020-02-13 PROCEDURE — 80053 COMPREHEN METABOLIC PANEL: CPT

## 2020-02-13 PROCEDURE — 93005 ELECTROCARDIOGRAM TRACING: CPT

## 2020-02-13 RX ORDER — NAPROXEN 500 MG/1
500 TABLET ORAL 2 TIMES DAILY WITH MEALS
Qty: 10 TABLET | Refills: 0 | Status: SHIPPED | OUTPATIENT
Start: 2020-02-13 | End: 2020-02-18

## 2020-02-13 RX ORDER — CYCLOBENZAPRINE HCL 10 MG
10 TABLET ORAL
Status: COMPLETED | OUTPATIENT
Start: 2020-02-13 | End: 2020-02-13

## 2020-02-13 RX ORDER — CYCLOBENZAPRINE HCL 10 MG
10 TABLET ORAL 3 TIMES DAILY PRN
Qty: 15 TABLET | Refills: 0 | Status: SHIPPED | OUTPATIENT
Start: 2020-02-13 | End: 2020-02-18

## 2020-02-13 RX ADMIN — CYCLOBENZAPRINE HYDROCHLORIDE 10 MG: 10 TABLET, FILM COATED ORAL at 06:02

## 2020-02-13 NOTE — ED PROVIDER NOTES
Encounter Date: 2/13/2020       History     Chief Complaint   Patient presents with    Chest Pain     48 yo F with no significant PMH, nonsmoker, no FH of early CAD, presents for evaluation of left mid back pain that has been intermittent for the past month. It is positional in nature, relieved after massage. She states she was on a plane yesterday and in an airport and knew she would have issues today. She does state it hurts to take a deep breath due to the pain. No associated SOB, fever, cough, calf pain or swelling. This episode of pain has been constant since last night. She has been taking Aleve OTC for the pain without relief. She does admit to taking 3-4 pills at a time. No abdominal pain, black or bloody stool. No other acute issues.         Review of patient's allergies indicates:  No Known Allergies  Past Medical History:   Diagnosis Date    Abnormal Pap smear 2006    once but repeated pap smear and the resulted WNLs.    Abnormal Pap smear of cervix     early 30 's became normal     Past Surgical History:   Procedure Laterality Date    COLONOSCOPY N/A 6/7/2017    Procedure: COLONOSCOPY;  Surgeon: Win Rodriguez MD;  Location: [facility];  Service: Endoscopy;  Laterality: N/A;     Family History   Problem Relation Age of Onset    Breast cancer Mother 40        unconfirmed.    Diabetes Maternal Grandmother     Ovarian cancer Neg Hx     Colon cancer Neg Hx      Social History     Tobacco Use    Smoking status: Never Smoker    Smokeless tobacco: Never Used   Substance Use Topics    Alcohol use: Yes     Comment: occasional    Drug use: No     Review of Systems   Constitutional: Negative for fever.   HENT: Negative for sore throat.    Eyes: Negative for visual disturbance.   Respiratory: Negative for cough and shortness of breath.    Cardiovascular: Positive for chest pain (left posterior chest wall pain).   Gastrointestinal: Negative for abdominal pain, anal bleeding, blood in stool, diarrhea,  nausea and vomiting.   Genitourinary: Negative for dysuria, flank pain, frequency, hematuria and vaginal bleeding.   Musculoskeletal: Negative for back pain and myalgias.   Skin: Negative for pallor and rash.   Neurological: Negative for weakness, light-headedness and headaches.   Hematological: Does not bruise/bleed easily.       Physical Exam     Initial Vitals [02/13/20 1421]   BP Pulse Resp Temp SpO2   (!) 142/92 85 16 98.2 °F (36.8 °C) 99 %      MAP       --         Physical Exam    Nursing note and vitals reviewed.  Constitutional: She appears well-developed and well-nourished. She is not diaphoretic. No distress.   HENT:   Head: Normocephalic and atraumatic.   Mouth/Throat: Oropharynx is clear and moist.   Eyes: Conjunctivae and EOM are normal.   Neck: Normal range of motion. Neck supple.   Cardiovascular: Normal rate, regular rhythm, normal heart sounds and intact distal pulses. Exam reveals no friction rub.    No murmur heard.  Pulmonary/Chest: Breath sounds normal. No respiratory distress. She has no wheezes. She has no rhonchi. She has no rales.   Abdominal: Soft. Bowel sounds are normal. She exhibits no distension. There is no tenderness.   Musculoskeletal: Normal range of motion. She exhibits no edema or tenderness.   No calf TTP, edema, asymmetry, erythema, warmth, palpable cords; TTP to left subscapular region, reproducing her symptoms of pain     Neurological: She is alert and oriented to person, place, and time. She has normal strength.   Skin: Skin is warm and dry. Capillary refill takes less than 2 seconds. No rash noted. No pallor.   Psychiatric: She has a normal mood and affect.         ED Course   Procedures  Labs Reviewed   CBC W/ AUTO DIFFERENTIAL - Abnormal; Notable for the following components:       Result Value    Mean Corpuscular Hemoglobin 26.4 (*)     Mean Corpuscular Hemoglobin Conc 31.2 (*)     All other components within normal limits   COMPREHENSIVE METABOLIC PANEL - Abnormal;  Notable for the following components:    Anion Gap 7 (*)     All other components within normal limits   TROPONIN I   D DIMER, QUANTITATIVE     EKG Readings: (Independently Interpreted)   Initial Reading: No STEMI. Rhythm: Normal Sinus Rhythm. Heart Rate: 82. Ectopy: No Ectopy. ST Segments: Normal ST Segments. T Waves: Normal.     ECG Results          EKG 12-lead (Final result)  Result time 02/14/20 10:45:49    Final result by Interface, Lab In [facility] (02/14/20 10:45:49)                 Narrative:    Test Reason : R07.9,    Vent. Rate : 082 BPM     Atrial Rate : 082 BPM     P-R Int : 152 ms          QRS Dur : 088 ms      QT Int : 380 ms       P-R-T Axes : 066 068 063 degrees     QTc Int : 443 ms    Normal sinus rhythm  Possible Left atrial enlargement  Borderline Abnormal ECG  When compared with ECG of 09-JUL-2014 15:07,  Previous ECG has undetermined rhythm, needs review  QT has lengthened  Confirmed by Gustavo Lopez MD ([id number]) on 2/14/2020 10:45:37 AM    Referred By:  DONNELL OCONNOR           Confirmed By:Gustavo Lopez MD                            Imaging Results          X-Ray Chest AP Portable (Final result)  Result time 02/13/20 16:06:18    Final result by David Soni MD (02/13/20 16:06:18)                 Impression:      No acute cardiac or pulmonary process.      Electronically signed by: David Soni MD  Date:    02/13/2020  Time:    16:06             Narrative:    CLINICAL HISTORY:  ([id number] y/o  (1970) F    Chest pain, unspecified    TECHNIQUE:  (A#[id number], exam time 2/13/2020 16:04)    XR CHEST AP PORTABLE [id number]    COMPARISON:  None available.    FINDINGS:  The lungs are clear. Costophrenic angles are seen without effusion. No pneumothorax is identified. The heart is normal in size. The mediastinum is within normal limits. Osseous structures appear within normal limits. The visualized upper abdomen is unremarkable.                              X-Rays:   Independently Interpreted  Readings:   Chest X-Ray: No acute abnormalities.     Medical Decision Making:   Initial Assessment:   48 yo F with no significant history presents for chest wall pain reproducible on exam. She is hemodynamically stable, well-appearing, normoxic on RA, no evidence of DVT. Considering recent travel, screening cardiac work-up with d-dimer obtained at triage to expedite care and is negative. I did extensively  her on NSAID use and appropriate dosing as well as side effect profile. She has a massage scheduled for tomorrow which typically alleviates her symptoms. I will give her a short course of Naprosyn and Flexeril to take as needed for pain and she is instructed to follow-up with her PCP next week for re-evaluation. Strict return precautions discussed. She is discharged in stable condition.  Differential Diagnosis:   Musculoskeletal pain, anemia, PUD, gastritis, less likely PE and ACS  Clinical Tests:   Lab Tests: Ordered and Reviewed  The following lab test(s) were unremarkable: CBC, CMP, Troponin and D-Dimer  Radiological Study: Ordered and Reviewed  Medical Tests: Ordered and Reviewed                   ED Course as of Feb 14 1231   Thu Feb 13, 2020   1416 50yo female presenting with complaint of mid thoracic pain worse with inspiration    [ES]   1417 48 yo female presenting with complaint of midthoracic pain  wrapping around left chest with pain with inspiration.      [ES]      ED Course User Index  [ES] Meenu Sosa PA-C                Clinical Impression:       ICD-10-CM ICD-9-CM   1. Chest wall pain R07.89 786.52   2. Chest pain R07.9 786.50         Disposition:   Disposition: Discharged  Condition: Stable                     Angélica Mills MD  02/14/20 1231

## 2020-02-13 NOTE — TELEPHONE ENCOUNTER
Contacted patient, she went to [facility] ER. Advised to give us a call back once discharged to schedule ER/hospital f/u appointment. Understanding verbalized.

## 2020-02-13 NOTE — TELEPHONE ENCOUNTER
----- Message from Tina Mauricio sent at 2/13/2020  1:26 PM CST -----  Contact: pt  Type: Needs Medical Advice    Who Called:  pt  Best Call Back Number: [phone] (home)   Additional Information: pt states she is having pains that in her back area to move to the front of chest. pls call to advise asap

## 2020-02-13 NOTE — TELEPHONE ENCOUNTER
Spoke to patient who stated she is at the ER waiting for results, but she is feeling better.  Stated that she would call back and schedule a follow up appointment when she is released.

## 2020-02-13 NOTE — TELEPHONE ENCOUNTER
"Dayo states she is in the car now, in [facility].  Reports CP that "circles my chest and back, and feels like it is squeezing my chest, making it hard to breathe.  I can feel it in between my shoulder blades also."  Constant since 1000 this morning, 5 of 10 pain, but increases to 8 of 10 frequently when radiating to back. The pain has been present for a few days, but is worsening today and is constant. Strong maternal history of heart disease. Explained this is an emergency, and she should call 911.  She said she is driving now, and will drive herself to [place] to be seen there.  Explained she should not be driving, per recommendations, but she says she wants to go to [place], her mother is in the car with her, and she will not go to closer ED.  Encouraged pull over and make a call to 911 if symptoms worsen.  She states understanding. Message to Stoney Stack MD, pcp.  Please contact caller directly with any additional care advice.      Reason for Disposition   Chest pain lasting longer than 5 minutes and ANY of the following:* Over 50 years old* Over 30 years old and at least one cardiac risk factor (i.e., high blood pressure, diabetes, high cholesterol, obesity, smoker or strong family history of heart disease)* Pain is crushing, pressure-like, or heavy * Took nitroglycerin and chest pain was not relieved* History of heart disease (i.e., angina, heart attack, bypass surgery, angioplasty, CHF)    Additional Information   Negative: Severe difficulty breathing (e.g., struggling for each breath, speaks in single words)   Negative: Passed out (i.e., fainted, collapsed and was not responding)    Protocols used: CHEST PAIN-A-OH      "

## 2020-03-10 ENCOUNTER — DOCUMENTATION ONLY (OUTPATIENT)
Dept: FAMILY MEDICINE | Facility: CLINIC | Age: 50
End: 2020-03-10

## 2020-03-10 NOTE — PROGRESS NOTES
Pre-Visit Chart Review  For Appointment Scheduled on (3/11/20)    Health Maintenance Due   Topic Date Due    TETANUS VACCINE  10/27/1990

## 2020-03-11 ENCOUNTER — OFFICE VISIT (OUTPATIENT)
Dept: FAMILY MEDICINE | Facility: CLINIC | Age: 50
End: 2020-03-11
Payer: COMMERCIAL

## 2020-03-11 VITALS
WEIGHT: 152.31 LBS | TEMPERATURE: 101 F | BODY MASS INDEX: 24.48 KG/M2 | DIASTOLIC BLOOD PRESSURE: 92 MMHG | HEART RATE: 104 BPM | HEIGHT: 66 IN | OXYGEN SATURATION: 98 % | SYSTOLIC BLOOD PRESSURE: 130 MMHG

## 2020-03-11 DIAGNOSIS — J06.9 URI, ACUTE: Primary | ICD-10-CM

## 2020-03-11 DIAGNOSIS — R50.9 FEVER, UNSPECIFIED FEVER CAUSE: ICD-10-CM

## 2020-03-11 LAB
INFLUENZA A, MOLECULAR: NEGATIVE
INFLUENZA B, MOLECULAR: NEGATIVE
SPECIMEN SOURCE: NORMAL

## 2020-03-11 PROCEDURE — 87502 INFLUENZA DNA AMP PROBE: CPT | Mod: PO

## 2020-03-11 PROCEDURE — 99213 OFFICE O/P EST LOW 20 MIN: CPT | Mod: S$GLB,,, | Performed by: PHYSICIAN ASSISTANT

## 2020-03-11 PROCEDURE — 3008F PR BODY MASS INDEX (BMI) DOCUMENTED: ICD-10-PCS | Mod: CPTII,S$GLB,, | Performed by: PHYSICIAN ASSISTANT

## 2020-03-11 PROCEDURE — 99213 PR OFFICE/OUTPT VISIT, EST, LEVL III, 20-29 MIN: ICD-10-PCS | Mod: S$GLB,,, | Performed by: PHYSICIAN ASSISTANT

## 2020-03-11 PROCEDURE — 99999 PR PBB SHADOW E&M-EST. PATIENT-LVL IV: CPT | Mod: PBBFAC,,, | Performed by: PHYSICIAN ASSISTANT

## 2020-03-11 PROCEDURE — 3008F BODY MASS INDEX DOCD: CPT | Mod: CPTII,S$GLB,, | Performed by: PHYSICIAN ASSISTANT

## 2020-03-11 PROCEDURE — 99999 PR PBB SHADOW E&M-EST. PATIENT-LVL IV: ICD-10-PCS | Mod: PBBFAC,,, | Performed by: PHYSICIAN ASSISTANT

## 2020-03-11 RX ORDER — ESTRADIOL 1 MG/1
TABLET ORAL
COMMUNITY
Start: 2020-02-24

## 2020-03-11 NOTE — PATIENT INSTRUCTIONS
Viral Upper Respiratory Illness (Adult)  You have a viral upper respiratory illness (URI), which is another term for the common cold. This illness is contagious during the first few days. It is spread through the air by coughing and sneezing. It may also be spread by direct contact (touching the sick person and then touching your own eyes, nose, or mouth). Frequent handwashing will decrease risk of spread. Most viral illnesses go away within 7 to 10 days with rest and simple home remedies. Sometimes the illness may last for several weeks. Antibiotics will not kill a virus, and they are generally not prescribed for this condition.    Home care  · If symptoms are severe, rest at home for the first 2 to 3 days. When you resume activity, don't let yourself get too tired.  · Avoid being exposed to cigarette smoke (yours or others).  · You may use acetaminophen or ibuprofen to control pain and fever, unless another medicine was prescribed. (Note: If you have chronic liver or kidney disease, have ever had a stomach ulcer or gastrointestinal bleeding, or are taking blood-thinning medicines, talk with your healthcare provider before using these medicines.) Aspirin should never be given to anyone under 18 years of age who is ill with a viral infection or fever. It may cause severe liver or brain damage.  · Your appetite may be poor, so a light diet is fine. Avoid dehydration by drinking 6 to 8 glasses of fluids per day (water, soft drinks, juices, tea, or soup). Extra fluids will help loosen secretions in the nose and lungs.  · Over-the-counter cold medicines will not shorten the length of time youre sick, but they may be helpful for the following symptoms: cough, sore throat, and nasal and sinus congestion. (Note: Do not use decongestants if you have high blood pressure.)  Follow-up care  Follow up with your healthcare provider, or as advised.  When to seek medical advice  Call your healthcare provider right away if any  of these occur:  · Cough with lots of colored sputum (mucus)  · Severe headache; face, neck, or ear pain  · Difficulty swallowing due to throat pain  · Fever of 100.4°F (38°C)  Call 911, or get immediate medical care  Call emergency services right away if any of these occur:  · Chest pain, shortness of breath, wheezing, or difficulty breathing  · Coughing up blood  · Inability to swallow due to throat pain  Date Last Reviewed: 9/13/2015 © 2000-2017 [employer]. [address] [place] [address]. All rights reserved. This information is not intended as a substitute for professional medical care. Always follow your healthcare professional's instructions.        Febrile Illness, Uncertain Cause (Adult)  You have a fever, but the cause is not certain. A fever is a natural reaction of the body to an illness such as infection due to a virus or bacteria. In most cases, the temperature itself is not harmful. It actually helps the body fight infections. A fever does not need to be treated unless you feel very uncomfortable.  Sometimes a fever can be an early sign of a more serious infection, so make sure to follow up if your condition worsens.  Home care  Unless given other instructions by your healthcare provider, follow these guidelines when caring for yourself at home.  General care  · If your symptoms are severe, rest at home for the first 2 to 3 days. When you resume activity, don't let yourself get too tired.  · Do not smoke. Also avoid being exposed to secondhand smoke.  · Your appetite may be poor, so a light diet is fine. Avoid dehydration by drinking 6 to 8 glasses of fluids per day (such as water, soft drinks, sports drinks, juices, tea, or soup). Extra fluids will help loosen secretions in the nose and lungs.  Medicines  · You can take acetaminophen or ibuprofen for pain, unless you were given a different fever-reducing/pain medicine to use. (Note: If you have chronic liver or kidney  disease or have ever had a stomach ulcer or gastrointestinal bleeding, talk with your healthcare provider before using these medicines. Also talk to your provider if you are taking medicine to prevent blood clots.) Aspirin should never be given to anyone younger than 18 years of age who is ill with a viral infection or fever. It may cause severe liver or brain damage.  · If you were given antibiotics, take them until they are used up, or your healthcare provider tells you to stop. It is important to finish the antibiotics even though you feel better. This is to make sure the infection has cleared. Be aware that antibiotics are not usually given for a fever with an unknown cause.  · Over-the-counter medicines will not shorten the duration of the illness. However, they may be helpful for the following symptoms: cough, sore throat, or nasal and sinus congestion. Ask your pharmacist for product suggestions. (Note: Do not use decongestants if you have high blood pressure.)  Follow-up care  Follow up with your healthcare provider, or as advised.  · If a culture was done, you will be notified if your treatment needs to be changed. You can call as directed for the results.  · If X-rays, a CT, or an ultrasound were done, a specialist will review them. You will be notified of any findings that may affect your care.  Call 911  Contact emergency services right away if any of these occur:  · Trouble breathing or swallowing, or wheezing  · Chest pain  · Confusion  · Extreme drowsiness or trouble awakening  · Fainting or loss of consciousness  · Rapid heart rate  · Low blood pressure  · Vomiting blood, or large amounts of blood in stool  · Seizure  When to seek medical advice  Call your healthcare provider right away if any of these occur:  · Cough with lots of colored sputum (mucus) or blood in your sputum  · Severe headache  · Face, neck, throat, or ear pain  · Feeling drowsy  · Abdominal pain  · Repeated vomiting or  diarrhea  · Joint pain or a new rash  · Burning when urinating  · Fever of 100.4°F (38°C) or higher, that does not get better after taking fever-reducing medicine  · Feeling weak or dizzy  Date Last Reviewed: 7/30/2015  © 5292-4617 [employer]. [address] [place] [address]. All rights reserved. This information is not intended as a substitute for professional medical care. Always follow your healthcare professional's instructions.

## 2020-03-11 NOTE — PROGRESS NOTES
Subjective:       Patient ID: Dayo Cortes is a 49 y.o. female.    Chief Complaint: Sore Throat (sore throat pain/headaches ,chills,body aches, no fever (3 days))    Patient presents for evaluation of chills body aches headache sore throat and cough.  Her symptoms began 2 days ago.  She is taking over-the-counter pain relievers with some improvement of her body aches.  She denies nasal congestion sneezing or rhinorrhea.  She has had a mild sore throat.  She denies nausea vomiting or diarrhea.  Patient has not traveled outside the country in the past 30 days.  She has had not had exposure to anyone that has traveled outside the country.  She states that her friend was sick with similar symptoms and went to the emergency room 2 days ago.  Her friend was negative for flu and strep throat.  Patient's highest temperature has been 100.5° F Patients patient medical/surgical, social and family histories have been reviewed       Review of Systems   Constitutional: Positive for activity change, appetite change, chills, fatigue and fever. Negative for diaphoresis.   HENT: Positive for sore throat. Negative for congestion, ear pain, postnasal drip, rhinorrhea, sinus pressure, sinus pain, sneezing and voice change.    Respiratory: Positive for cough. Negative for chest tightness, shortness of breath and wheezing.    Cardiovascular: Negative for chest pain.   Gastrointestinal: Negative for abdominal pain, diarrhea, nausea and vomiting.   Musculoskeletal: Positive for arthralgias, back pain and myalgias.   Skin: Negative for rash and wound.   Neurological: Positive for headaches.       Objective:      Physical Exam   Constitutional: She appears well-developed and well-nourished. She is cooperative. No distress.   HENT:   Head: Normocephalic and atraumatic.   Right Ear: Tympanic membrane, external ear and ear canal normal.   Left Ear: Tympanic membrane, external ear and ear canal normal.   Nose: No mucosal edema.   Mouth/Throat:  Oropharynx is clear and moist. Mucous membranes are not dry. No oropharyngeal exudate, posterior oropharyngeal edema or posterior oropharyngeal erythema.   Cardiovascular: Normal rate and regular rhythm.   No murmur heard.  Pulmonary/Chest: Effort normal and breath sounds normal. She has no wheezes. She has no rales.   Lymphadenopathy:        Head (right side): Tonsillar adenopathy present.        Head (left side): Tonsillar adenopathy present.     She has no cervical adenopathy.   Neurological: She is alert.   Skin: Skin is warm and dry.   Nursing note and vitals reviewed.      Assessment:       1. URI, acute    2. Fever, unspecified fever cause        Plan:       Dayo was seen today for sore throat.    Diagnoses and all orders for this visit:    URI, acute  -  Fever, unspecified fever cause  -     Influenza A & B by Molecular; Future  -     Influenza A & B by Molecular       Recent Results (from the past 24 hour(s))   Influenza A & B by Molecular    Collection Time: 03/11/20  9:45 AM   Result Value Ref Range    Influenza A, Molecular Negative Negative    Influenza B, Molecular Negative Negative    Flu A & B Source Nasal swab      Dayo Cortes was given a handout which discussed their disease process, precautions, and instructions for follow-up and therapy.  Patient did not meet the guidelines at this time to be tested for covid 19.  I recommended home isolation as much as possible.       No follow-ups on file.   DISCLAIMER: This note was prepared with [employer] voice recognition transcription software. Garbled syntax, mangled pronouns, and other bizarre constructions may be attributed to that software system

## 2020-03-13 ENCOUNTER — TELEPHONE (OUTPATIENT)
Dept: FAMILY MEDICINE | Facility: CLINIC | Age: 50
End: 2020-03-13

## 2020-03-13 NOTE — TELEPHONE ENCOUNTER
"Per patient, feeling much better. Mucus is still clear but has decreased. Body ache is gone. No fever, cough or SOB. Still slightly fatigue. Still have appetite and able to keep all the food down. The only thing she has is backache. Head and throat is "much better" been taking motrin, push fluids and Vit C.     Advised to con't to monitor and to contact our office if she has any questions/concerns. Understanding verbalized.   "

## 2020-05-21 DIAGNOSIS — A60.00 GENITAL HSV: ICD-10-CM

## 2020-05-21 RX ORDER — VALACYCLOVIR HYDROCHLORIDE 500 MG/1
TABLET, FILM COATED ORAL
Qty: 60 TABLET | Refills: 0 | Status: SHIPPED | OUTPATIENT
Start: 2020-05-21 | End: 2020-08-10

## 2020-05-26 ENCOUNTER — TELEPHONE (OUTPATIENT)
Dept: FAMILY MEDICINE | Facility: CLINIC | Age: 50
End: 2020-05-26

## 2020-05-26 DIAGNOSIS — Z20.822 EXPOSURE TO COVID-19 VIRUS: Primary | ICD-10-CM

## 2020-05-26 DIAGNOSIS — Z00.00 HEALTH CARE MAINTENANCE: ICD-10-CM

## 2020-05-26 NOTE — TELEPHONE ENCOUNTER
Patient has a VV with you on 6/4/2020 and she would like to get labs before appt. Please advise thank you.   Pended lipid panel , CMP, CBC.    Last lipid panel 3/14/19  Last CBC 2/13/20  Last CMP 2/13/20

## 2020-05-26 NOTE — TELEPHONE ENCOUNTER
----- Message from Lisa Damon sent at 5/26/2020 10:16 AM CDT -----  Contact: Appointment Labs  Type: Needs Medical Advice  Who Called:  Patient   Best Call Back Number: [phone]  Additional Information: pt scheduled virtual appt and needs order for labs, please call back and advise

## 2020-05-30 ENCOUNTER — LAB VISIT (OUTPATIENT)
Dept: LAB | Facility: HOSPITAL | Age: 50
End: 2020-05-30
Attending: FAMILY MEDICINE
Payer: COMMERCIAL

## 2020-05-30 DIAGNOSIS — Z20.822 EXPOSURE TO COVID-19 VIRUS: ICD-10-CM

## 2020-05-30 DIAGNOSIS — Z00.00 HEALTH CARE MAINTENANCE: ICD-10-CM

## 2020-05-30 LAB
ALBUMIN SERPL BCP-MCNC: 3.9 G/DL (ref 3.5–5.2)
ALP SERPL-CCNC: 84 U/L (ref 55–135)
ALT SERPL W/O P-5'-P-CCNC: 17 U/L (ref 10–44)
ANION GAP SERPL CALC-SCNC: 7 MMOL/L (ref 8–16)
AST SERPL-CCNC: 18 U/L (ref 10–40)
BASOPHILS # BLD AUTO: 0.03 K/UL (ref 0–0.2)
BASOPHILS NFR BLD: 0.5 % (ref 0–1.9)
BILIRUB SERPL-MCNC: 0.4 MG/DL (ref 0.1–1)
BUN SERPL-MCNC: 18 MG/DL (ref 6–20)
CALCIUM SERPL-MCNC: 9.3 MG/DL (ref 8.7–10.5)
CHLORIDE SERPL-SCNC: 108 MMOL/L (ref 95–110)
CHOLEST SERPL-MCNC: 230 MG/DL (ref 120–199)
CHOLEST/HDLC SERPL: 2.6 {RATIO} (ref 2–5)
CO2 SERPL-SCNC: 25 MMOL/L (ref 23–29)
CREAT SERPL-MCNC: 0.9 MG/DL (ref 0.5–1.4)
DIFFERENTIAL METHOD: ABNORMAL
EOSINOPHIL # BLD AUTO: 0.2 K/UL (ref 0–0.5)
EOSINOPHIL NFR BLD: 4.2 % (ref 0–8)
ERYTHROCYTE [DISTWIDTH] IN BLOOD BY AUTOMATED COUNT: 14 % (ref 11.5–14.5)
EST. GFR  (AFRICAN AMERICAN): >60 ML/MIN/1.73 M^2
EST. GFR  (NON AFRICAN AMERICAN): >60 ML/MIN/1.73 M^2
GLUCOSE SERPL-MCNC: 92 MG/DL (ref 70–110)
HCT VFR BLD AUTO: 41.7 % (ref 37–48.5)
HDLC SERPL-MCNC: 89 MG/DL (ref 40–75)
HDLC SERPL: 38.7 % (ref 20–50)
HGB BLD-MCNC: 12.4 G/DL (ref 12–16)
IMM GRANULOCYTES # BLD AUTO: 0.01 K/UL (ref 0–0.04)
IMM GRANULOCYTES NFR BLD AUTO: 0.2 % (ref 0–0.5)
LDLC SERPL CALC-MCNC: 129.4 MG/DL (ref 63–159)
LYMPHOCYTES # BLD AUTO: 2.6 K/UL (ref 1–4.8)
LYMPHOCYTES NFR BLD: 45.5 % (ref 18–48)
MCH RBC QN AUTO: 26.4 PG (ref 27–31)
MCHC RBC AUTO-ENTMCNC: 29.7 G/DL (ref 32–36)
MCV RBC AUTO: 89 FL (ref 82–98)
MONOCYTES # BLD AUTO: 0.4 K/UL (ref 0.3–1)
MONOCYTES NFR BLD: 7.3 % (ref 4–15)
NEUTROPHILS # BLD AUTO: 2.4 K/UL (ref 1.8–7.7)
NEUTROPHILS NFR BLD: 42.3 % (ref 38–73)
NONHDLC SERPL-MCNC: 141 MG/DL
NRBC BLD-RTO: 0 /100 WBC
PLATELET # BLD AUTO: 248 K/UL (ref 150–350)
PMV BLD AUTO: 10.3 FL (ref 9.2–12.9)
POTASSIUM SERPL-SCNC: 3.9 MMOL/L (ref 3.5–5.1)
PROT SERPL-MCNC: 7.2 G/DL (ref 6–8.4)
RBC # BLD AUTO: 4.7 M/UL (ref 4–5.4)
SARS-COV-2 IGG SERPLBLD QL IA.RAPID: POSITIVE
SODIUM SERPL-SCNC: 140 MMOL/L (ref 136–145)
TRIGL SERPL-MCNC: 58 MG/DL (ref 30–150)
WBC # BLD AUTO: 5.65 K/UL (ref 3.9–12.7)

## 2020-05-30 PROCEDURE — 36415 COLL VENOUS BLD VENIPUNCTURE: CPT | Mod: PO

## 2020-05-30 PROCEDURE — 86769 SARS-COV-2 COVID-19 ANTIBODY: CPT

## 2020-05-30 PROCEDURE — 80053 COMPREHEN METABOLIC PANEL: CPT

## 2020-05-30 PROCEDURE — 80061 LIPID PANEL: CPT

## 2020-05-30 PROCEDURE — 85025 COMPLETE CBC W/AUTO DIFF WBC: CPT

## 2020-06-03 ENCOUNTER — DOCUMENTATION ONLY (OUTPATIENT)
Dept: FAMILY MEDICINE | Facility: CLINIC | Age: 50
End: 2020-06-03

## 2020-06-03 NOTE — PROGRESS NOTES
Pre-Visit Chart Review  For Appointment Scheduled on 6/4/2020    Health Maintenance Due   Topic Date Due    TETANUS VACCINE  10/27/1990

## 2020-06-04 ENCOUNTER — OFFICE VISIT (OUTPATIENT)
Dept: FAMILY MEDICINE | Facility: CLINIC | Age: 50
End: 2020-06-04
Payer: COMMERCIAL

## 2020-06-04 DIAGNOSIS — R11.0 NAUSEA WITHOUT VOMITING: ICD-10-CM

## 2020-06-04 DIAGNOSIS — K21.9 GASTROESOPHAGEAL REFLUX DISEASE, ESOPHAGITIS PRESENCE NOT SPECIFIED: Primary | ICD-10-CM

## 2020-06-04 PROCEDURE — 99213 PR OFFICE/OUTPT VISIT, EST, LEVL III, 20-29 MIN: ICD-10-PCS | Mod: 95,,, | Performed by: NURSE PRACTITIONER

## 2020-06-04 PROCEDURE — 99213 OFFICE O/P EST LOW 20 MIN: CPT | Mod: 95,,, | Performed by: NURSE PRACTITIONER

## 2020-06-04 RX ORDER — PANTOPRAZOLE SODIUM 20 MG/1
20 TABLET, DELAYED RELEASE ORAL DAILY
Qty: 30 TABLET | Refills: 2 | Status: SHIPPED | OUTPATIENT
Start: 2020-06-04 | End: 2020-12-04

## 2020-06-04 NOTE — PROGRESS NOTES
The patient location is: Home  The chief complaint leading to consultation is: nausea    Visit type: audiovisual    Face to Face time with patient: 10   15 minutes of total time spent on the encounter, which includes face to face time and non-face to face time preparing to see the patient (eg, review of tests), Obtaining and/or reviewing separately obtained history, Documenting clinical information in the electronic or other health record, Independently interpreting results (not separately reported) and communicating results to the patient/family/caregiver, or Care coordination (not separately reported).         Each patient to whom he or she provides medical services by telemedicine is:  (1) informed of the relationship between the physician and patient and the respective role of any other health care provider with respect to management of the patient; and (2) notified that he or she may decline to receive medical services by telemedicine and may withdraw from such care at any time.    Notes:    Patient ID: Dayo Cortes is a 49 y.o. female.    Chief Complaint:   Nausea    Gastroesophageal Reflux   She complains of early satiety, heartburn and nausea. She reports no abdominal pain, no choking, no coughing, no dysphagia or no sore throat. This is a recurrent problem. The current episode started more than 1 month ago. The problem has been waxing and waning. The heartburn duration is several minutes. The heartburn is located in the substernum. The heartburn is of moderate intensity. The heartburn does not wake her from sleep. The heartburn does not limit her activity. The heartburn doesn't change with position. The symptoms are aggravated by certain foods and ETOH. Pertinent negatives include no anemia, fatigue, melena, muscle weakness, orthopnea or weight loss. Risk factors include ETOH use and lack of exercise. She has tried nothing for the symptoms.        Patient is new to me. Reviewed past medical and social  history.    Past Medical History:   Diagnosis Date    Abnormal Pap smear 2006    once but repeated pap smear and the resulted WNLs.    Abnormal Pap smear of cervix     early 30 's became normal     Past Surgical History:   Procedure Laterality Date    COLONOSCOPY N/A 6/7/2017    Procedure: COLONOSCOPY;  Surgeon: Win Rodriguez MD;  Location: [facility];  Service: Endoscopy;  Laterality: N/A;     Current Outpatient Medications on File Prior to Visit   Medication Sig Dispense Refill    estradioL (ESTRACE) 1 MG tablet TK 1 T PO QD      multivitamin capsule Take 1 capsule by mouth once daily.      mupirocin (BACTROBAN) 2 % ointment Apply topically 3 (three) times daily. 1 Tube 1    valACYclovir (VALTREX) 500 MG tablet TAKE 1 TABLET BY MOUTH TWICE DAILY 60 tablet 0     No current facility-administered medications on file prior to visit.        Review of Systems   Constitutional: Negative for fatigue and weight loss.   HENT: Negative for sore throat.    Respiratory: Negative for cough and choking.    Gastrointestinal: Positive for heartburn and nausea. Negative for abdominal pain, dysphagia and melena.   Musculoskeletal: Negative for muscle weakness.   All other systems reviewed and are negative.      Objective:      Physical Exam   Constitutional: She is oriented to person, place, and time. She appears well-developed and well-nourished.   Eyes: Conjunctivae are normal.   Neck: Normal range of motion.   Pulmonary/Chest: Effort normal.   Neurological: She is alert and oriented to person, place, and time.   Psychiatric: She has a normal mood and affect.       Assessment:       1. Gastroesophageal reflux disease, esophagitis presence not specified    2. Nausea without vomiting        Plan:       Dayo was seen today for nausea.    Diagnoses and all orders for this visit:    Gastroesophageal reflux disease, esophagitis presence not specified  -     pantoprazole (PROTONIX) 20 MG tablet; Take 1 tablet (20 mg total) by  mouth once daily.    Nausea without vomiting    Patient education provided.  All questions and concerns addressed  RTC PRN and if symptoms worsen or fail to improve  Patient verbalizes understanding        Patient Instructions       Lifestyle Changes for Controlling GERD  When you have GERD, stomach acid feels as if its backing up toward your mouth. Whether or not you take medicine to control your GERD, your symptoms can often be improved with lifestyle changes. Talk to your healthcare provider about the following suggestions. These suggestions may help you get relief from your symptoms.      Raise your head  Reflux is more likely to strike when youre lying down flat, because stomach fluid can flow backward more easily. Raising the head of your bed 4 to 6 inches can help. To do this:  · Slide blocks or books under the legs at the head of your bed. Or, place a wedge under the mattress. Many [employer] can make a suitable wedge for you. The wedge should run from your waist to the top of your head.  · Dont just prop your head on several pillows. This increases pressure on your stomach. It can make GERD worse.  Watch your eating habits  Certain foods may increase the acid in your stomach or relax the lower esophageal sphincter. This makes GERD more likely. Its best to avoid the following if they cause you symptoms:  · Coffee, tea, and carbonated drinks (with and without caffeine)  · Fatty, fried, or spicy food  · Mint, chocolate, onions, and tomatoes  · Peppermint  · Any other foods that seem to irritate your stomach or cause you pain  Relieve the pressure  Tips include the following:  · Eat smaller meals, even if you have to eat more often.  · Dont lie down right after you eat. Wait a few hours for your stomach to empty.  · Avoid tight belts and tight-fitting clothes.  · Lose excess weight.  Tobacco and alcohol  Avoid smoking tobacco and drinking alcohol. They can make GERD symptoms worse.  Date Last Reviewed:  7/1/2016  © 8449-9899 [employer]. [address] [place] [address], [place] [address]. All rights reserved. This information is not intended as a substitute for professional medical care. Always follow your healthcare professional's instructions.

## 2020-06-04 NOTE — PATIENT INSTRUCTIONS
Lifestyle Changes for Controlling GERD  When you have GERD, stomach acid feels as if its backing up toward your mouth. Whether or not you take medicine to control your GERD, your symptoms can often be improved with lifestyle changes. Talk to your healthcare provider about the following suggestions. These suggestions may help you get relief from your symptoms.      Raise your head  Reflux is more likely to strike when youre lying down flat, because stomach fluid can flow backward more easily. Raising the head of your bed 4 to 6 inches can help. To do this:  · Slide blocks or books under the legs at the head of your bed. Or, place a wedge under the mattress. [employer] can make a suitable wedge for you. The wedge should run from your waist to the top of your head.  · Dont just prop your head on several pillows. This increases pressure on your stomach. It can make GERD worse.  Watch your eating habits  Certain foods may increase the acid in your stomach or relax the lower esophageal sphincter. This makes GERD more likely. Its best to avoid the following if they cause you symptoms:  · Coffee, tea, and carbonated drinks (with and without caffeine)  · Fatty, fried, or spicy food  · Mint, chocolate, onions, and tomatoes  · Peppermint  · Any other foods that seem to irritate your stomach or cause you pain  Relieve the pressure  Tips include the following:  · Eat smaller meals, even if you have to eat more often.  · Dont lie down right after you eat. Wait a few hours for your stomach to empty.  · Avoid tight belts and tight-fitting clothes.  · Lose excess weight.  Tobacco and alcohol  Avoid smoking tobacco and drinking alcohol. They can make GERD symptoms worse.  Date Last Reviewed: 7/1/2016  © 4706-3394 [employer]. [address] [place] [address], [place] [address]. All rights reserved. This information is not intended as a substitute for professional medical care. Always follow your healthcare  professional's instructions.

## 2020-08-10 ENCOUNTER — TELEPHONE (OUTPATIENT)
Dept: FAMILY MEDICINE | Facility: CLINIC | Age: 50
End: 2020-08-10

## 2020-08-10 ENCOUNTER — OFFICE VISIT (OUTPATIENT)
Dept: FAMILY MEDICINE | Facility: CLINIC | Age: 50
End: 2020-08-10
Payer: COMMERCIAL

## 2020-08-10 VITALS
DIASTOLIC BLOOD PRESSURE: 74 MMHG | BODY MASS INDEX: 25.47 KG/M2 | TEMPERATURE: 98 F | HEIGHT: 66 IN | WEIGHT: 158.5 LBS | OXYGEN SATURATION: 99 % | SYSTOLIC BLOOD PRESSURE: 122 MMHG | HEART RATE: 90 BPM

## 2020-08-10 DIAGNOSIS — K21.9 GASTROESOPHAGEAL REFLUX DISEASE, ESOPHAGITIS PRESENCE NOT SPECIFIED: ICD-10-CM

## 2020-08-10 DIAGNOSIS — B96.89 BACTERIAL RESPIRATORY INFECTION: Primary | ICD-10-CM

## 2020-08-10 DIAGNOSIS — E66.3 OVERWEIGHT (BMI 25.0-29.9): ICD-10-CM

## 2020-08-10 DIAGNOSIS — J98.8 BACTERIAL RESPIRATORY INFECTION: Primary | ICD-10-CM

## 2020-08-10 PROCEDURE — 99213 PR OFFICE/OUTPT VISIT, EST, LEVL III, 20-29 MIN: ICD-10-PCS | Mod: S$GLB,,, | Performed by: NURSE PRACTITIONER

## 2020-08-10 PROCEDURE — 99999 PR PBB SHADOW E&M-EST. PATIENT-LVL III: ICD-10-PCS | Mod: PBBFAC,,, | Performed by: NURSE PRACTITIONER

## 2020-08-10 PROCEDURE — 3008F PR BODY MASS INDEX (BMI) DOCUMENTED: ICD-10-PCS | Mod: CPTII,S$GLB,, | Performed by: NURSE PRACTITIONER

## 2020-08-10 PROCEDURE — 99213 OFFICE O/P EST LOW 20 MIN: CPT | Mod: S$GLB,,, | Performed by: NURSE PRACTITIONER

## 2020-08-10 PROCEDURE — 99999 PR PBB SHADOW E&M-EST. PATIENT-LVL III: CPT | Mod: PBBFAC,,, | Performed by: NURSE PRACTITIONER

## 2020-08-10 PROCEDURE — 3008F BODY MASS INDEX DOCD: CPT | Mod: CPTII,S$GLB,, | Performed by: NURSE PRACTITIONER

## 2020-08-10 RX ORDER — AMOXICILLIN AND CLAVULANATE POTASSIUM 875; 125 MG/1; MG/1
1 TABLET, FILM COATED ORAL EVERY 12 HOURS
Qty: 14 TABLET | Refills: 0 | Status: SHIPPED | OUTPATIENT
Start: 2020-08-10 | End: 2020-08-17

## 2020-08-10 NOTE — PROGRESS NOTES
Subjective:       Patient ID: Dayo Cortes is a 49 y.o. female.    Chief Complaint: Sore Throat    URI   This is a new problem. There has been no fever. Associated symptoms include headaches and a sore throat. Pertinent negatives include no chest pain, coughing, plugged ear sensation, rash, rhinorrhea, sinus pain, sneezing or wheezing.       Past Medical History:   Diagnosis Date    Abnormal Pap smear 2006    once but repeated pap smear and the resulted WNLs.    Abnormal Pap smear of cervix     early 30 's became normal       Review of patient's allergies indicates:  No Known Allergies      Current Outpatient Medications:     (Magic mouthwash) 1:1:1 Benadryl 12.5mg/5ml liq, aluminum & magnesium hydroxide-simehticone (Maalox), lidocaine viscous 2%, Swish and spit 5 mLs every 4 (four) hours as needed. for mouth sores, Disp: 90 mL, Rfl: 0    amoxicillin-clavulanate 875-125mg (AUGMENTIN) 875-125 mg per tablet, Take 1 tablet by mouth every 12 (twelve) hours. for 7 days, Disp: 14 tablet, Rfl: 0    estradioL (ESTRACE) 1 MG tablet, TK 1 T PO QD, Disp: , Rfl:     multivitamin capsule, Take 1 capsule by mouth once daily., Disp: , Rfl:     pantoprazole (PROTONIX) 20 MG tablet, Take 1 tablet (20 mg total) by mouth once daily., Disp: 30 tablet, Rfl: 2    Review of Systems   Constitutional: Negative for unexpected weight change.   HENT: Positive for sore throat. Negative for rhinorrhea, sinus pain, sneezing and trouble swallowing.    Eyes: Negative for visual disturbance.   Respiratory: Negative for cough, shortness of breath and wheezing.    Cardiovascular: Negative for chest pain, palpitations and leg swelling.   Gastrointestinal: Negative for blood in stool.   Genitourinary: Negative for hematuria.   Skin: Negative for rash.   Allergic/Immunologic: Negative for immunocompromised state.   Neurological: Positive for headaches.   Hematological: Does not bruise/bleed easily.   Psychiatric/Behavioral: Negative for  "agitation. The patient is not nervous/anxious.        Objective:      /74 (BP Location: Left arm, Patient Position: Sitting, BP Method: Medium (Manual))   Pulse 90   Temp 98.1 °F (36.7 °C) (Other (see comments))   Ht 5' 6" (1.676 m)   Wt 71.9 kg (158 lb 8.2 oz)   LMP 03/12/2019 (Exact Date)   SpO2 99%   BMI 25.58 kg/m²   Physical Exam  Constitutional:       Appearance: She is well-developed.   HENT:      Mouth/Throat:      Pharynx: Pharyngeal swelling and posterior oropharyngeal erythema present.   Eyes:      Pupils: Pupils are equal, round, and reactive to light.   Neck:      Musculoskeletal: Normal range of motion.   Cardiovascular:      Rate and Rhythm: Normal rate and regular rhythm.      Heart sounds: Normal heart sounds.   Pulmonary:      Effort: Pulmonary effort is normal.      Breath sounds: Normal breath sounds.   Abdominal:      General: Bowel sounds are normal.      Palpations: Abdomen is soft.   Musculoskeletal: Normal range of motion.   Skin:     General: Skin is warm and dry.   Neurological:      Mental Status: She is alert and oriented to person, place, and time.   Psychiatric:         Behavior: Behavior normal.         Thought Content: Thought content normal.         Judgment: Judgment normal.         Assessment:       1. Bacterial respiratory infection    2. Gastroesophageal reflux disease, esophagitis presence not specified    3. Overweight (BMI 25.0-29.9)        Plan:       Bacterial respiratory infection  -     amoxicillin-clavulanate 875-125mg (AUGMENTIN) 875-125 mg per tablet; Take 1 tablet by mouth every 12 (twelve) hours. for 7 days  Dispense: 14 tablet; Refill: 0  -     (Magic mouthwash) 1:1:1 Benadryl 12.5mg/5ml liq, aluminum & magnesium hydroxide-simehticone (Maalox), lidocaine viscous 2%; Swish and spit 5 mLs every 4 (four) hours as needed. for mouth sores  Dispense: 90 mL; Refill: 0    Gastroesophageal reflux disease, esophagitis presence not specified  Stable, continue " "hever  Overweight (BMI 25.0-29.9)  Counseled patient on his ideal body weight, health consequences of being obese and current recommendations including weekly exercise and a heart healthy diet.  Current BMI is:Estimated body mass index is 25.58 kg/m² as calculated from the following:    Height as of this encounter: 5' 6" (1.676 m).    Weight as of this encounter: 71.9 kg (158 lb 8.2 oz)..  Patient is aware that ideal BMI < 25 or Weight in (lb) to have BMI = 25: 154.6.            Time spent with patient: 20 minutes    Patient with be reevaluated in 6 months or sooner prn    Greater than 50% of this visit was spent counseling as described in above documentation:Yes  "

## 2020-08-19 ENCOUNTER — PATIENT MESSAGE (OUTPATIENT)
Dept: FAMILY MEDICINE | Facility: CLINIC | Age: 50
End: 2020-08-19

## 2020-08-19 DIAGNOSIS — J06.9 URI, ACUTE: Primary | ICD-10-CM

## 2020-08-20 ENCOUNTER — OFFICE VISIT (OUTPATIENT)
Dept: FAMILY MEDICINE | Facility: CLINIC | Age: 50
End: 2020-08-20
Payer: COMMERCIAL

## 2020-08-20 DIAGNOSIS — R68.2 DRY MOUTH: ICD-10-CM

## 2020-08-20 DIAGNOSIS — R59.9 GLANDS SWOLLEN: ICD-10-CM

## 2020-08-20 DIAGNOSIS — M54.50 CHRONIC MIDLINE LOW BACK PAIN, UNSPECIFIED WHETHER SCIATICA PRESENT: Primary | ICD-10-CM

## 2020-08-20 DIAGNOSIS — G89.29 CHRONIC MIDLINE LOW BACK PAIN, UNSPECIFIED WHETHER SCIATICA PRESENT: Primary | ICD-10-CM

## 2020-08-20 PROCEDURE — 99214 OFFICE O/P EST MOD 30 MIN: CPT | Mod: 95,,, | Performed by: FAMILY MEDICINE

## 2020-08-20 PROCEDURE — 99214 PR OFFICE/OUTPT VISIT, EST, LEVL IV, 30-39 MIN: ICD-10-PCS | Mod: 95,,, | Performed by: FAMILY MEDICINE

## 2020-08-20 NOTE — PROGRESS NOTES
The patient location is:  [place]  The chief complaint leading to consultation is: back pain  Visit type: Virtual visit with synchronous audio and video  Total time spent with patient:  25 minutes  Each patient to whom he or she provides medical services by telemedicine is:  (1) informed of the relationship between the physician and patient and the respective role of any other health care provider with respect to management of the patient; and (2) notified that he or she may decline to receive medical services by telemedicine and may withdraw from such care at any time. Vital signs recorded were provided by the patient.    Notes:  See below    Subjective:   Patient ID: Dayo Cortes is a 49 y.o. female     Chief Complaint: back pain    Pt with multiple complaints. Lower back pain getting worse. Has been going on for years. Pt also with swollen glands, fatigue, and dry mouth. Pt states strange symptoms and not sure she being overly concerned. Pt does endorse stress related to current pandemic.    Review of Systems  Past Medical History:   Diagnosis Date    Abnormal Pap smear 2006    once but repeated pap smear and the resulted WNLs.    Abnormal Pap smear of cervix     early 30 's became normal     Past Surgical History:   Procedure Laterality Date    COLONOSCOPY N/A 6/7/2017    Procedure: COLONOSCOPY;  Surgeon: Win Rodriguez MD;  Location: [facility];  Service: Endoscopy;  Laterality: N/A;     Objective:   There were no vitals filed for this visit.  There is no height or weight on file to calculate BMI.  Physical Exam  Assessment:     1. Chronic midline low back pain, unspecified whether sciatica present    2. Dry mouth    3. Glands swollen      Plan:   Chronic midline low back pain, unspecified whether sciatica present  -     Ambulatory referral/consult to [facility]; Future; Expected date: 08/27/2020  -     CBC auto differential; Future; Expected date: 08/20/2020  -     Comprehensive metabolic  panel; Future; Expected date: 08/20/2020    Dry mouth  -     Sedimentation rate; Future; Expected date: 08/20/2020  -     C-reactive protein; Future; Expected date: 08/20/2020  -     TSH; Future; Expected date: 08/20/2020  -     ANGIE Screen w/Reflex; Future; Expected date: 08/20/2020    Glands swollen  -     US Soft Tissue Head Neck Thyroid; Future; Expected date: 08/20/2020            Time spent with patient: 25 minutes and over half of that time was spent on counseling an coordination of care.    Stoney Stack MD  08/20/2020    Portions of this note have been dictated with ODALYS Phan.

## 2020-08-21 NOTE — TELEPHONE ENCOUNTER
Pt states that she followed up with PCP, and he ordered different test. She states that she will follow up next week if interested. This has been fully explained to the patient, who indicates understanding.

## 2020-08-22 ENCOUNTER — HOSPITAL ENCOUNTER (OUTPATIENT)
Dept: RADIOLOGY | Facility: HOSPITAL | Age: 50
Discharge: HOME | End: 2020-08-22
Attending: FAMILY MEDICINE
Payer: COMMERCIAL

## 2020-08-22 DIAGNOSIS — R59.9 GLANDS SWOLLEN: ICD-10-CM

## 2020-08-22 PROCEDURE — 76536 US SOFT TISSUE HEAD NECK THYROID: ICD-10-PCS | Mod: 26,,, | Performed by: RADIOLOGY

## 2020-08-22 PROCEDURE — 76536 US EXAM OF HEAD AND NECK: CPT | Mod: TC

## 2020-08-22 PROCEDURE — 76536 US EXAM OF HEAD AND NECK: CPT | Mod: 26,,, | Performed by: RADIOLOGY

## 2020-08-24 ENCOUNTER — TELEPHONE (OUTPATIENT)
Dept: FAMILY MEDICINE | Facility: CLINIC | Age: 50
End: 2020-08-24

## 2020-08-24 RX ORDER — ACYCLOVIR 400 MG/1
400 TABLET ORAL 2 TIMES DAILY
Qty: 180 TABLET | Refills: 3 | Status: SHIPPED | OUTPATIENT
Start: 2020-08-24 | End: 2022-01-07 | Stop reason: SDUPTHER

## 2020-08-24 NOTE — TELEPHONE ENCOUNTER
----- Message from Isabella Rodriguez sent at 8/22/2020  9:53 AM CDT -----  Type:  RX Refill Request    Who Called:  [place]  Refill or New Rx:  refill ? new  RX Name and Strength:  acyclovir (ZOVIRAX) 400 MG tablet  How is the patient currently taking it? (ex. 1XDay):  one 2 times daily  Is this a 30 day or 90 day RX:  90  Preferred Pharmacy with phone number:    [id number] - [place] - [address] AT [address] [facility]  [address]  [place] [address]  Phone: [phone] Fax: [phone]  Local or Mail Order:  local  Ordering Provider:  STUART Catalan Call Back Number:  [phone]  Additional Information:  Thank you!

## 2020-08-24 NOTE — TELEPHONE ENCOUNTER
----- Message from Tonio Avery sent at 8/24/2020  2:13 PM CDT -----  Regarding: results  Contact: self  Type:  Test Results    Who Called:  self   Name of Test (Lab/Mammo/Etc):  ultrasound   Date of Test:  08/22/2020  Ordering Provider:  Dr Stack  Where the test was performed:  [facility]  Best Call Back Number:  [phone]  Additional Information:  Patient want to discuss ultrasound test results,  and why is the doctor ordering [facility].

## 2020-08-25 ENCOUNTER — TELEPHONE (OUTPATIENT)
Dept: FAMILY MEDICINE | Facility: CLINIC | Age: 50
End: 2020-08-25

## 2020-08-25 NOTE — TELEPHONE ENCOUNTER
----- Message from Leeanne Rojo sent at 8/25/2020  3:03 PM CDT -----  Regarding: advice  Contact: ALIDA JACOME [[id number]]  Patient is requesting a call back from the nurse stated she's calling to follow up on US and labs.    Please call the patient upon request at phone number [phone].

## 2020-08-26 ENCOUNTER — TELEPHONE (OUTPATIENT)
Dept: FAMILY MEDICINE | Facility: CLINIC | Age: 50
End: 2020-08-26

## 2020-08-26 ENCOUNTER — PATIENT MESSAGE (OUTPATIENT)
Dept: FAMILY MEDICINE | Facility: CLINIC | Age: 50
End: 2020-08-26

## 2020-08-26 DIAGNOSIS — E04.1 THYROID NODULE: Primary | ICD-10-CM

## 2020-08-26 NOTE — TELEPHONE ENCOUNTER
----- Message from Sammie Shipley sent at 8/26/2020  2:16 PM CDT -----  Regarding: medication  Contact: pt [phone]  Pt is calling to speak with someone in the office about her medication

## 2020-08-27 ENCOUNTER — OFFICE VISIT (OUTPATIENT)
Dept: ENDOCRINOLOGY | Facility: CLINIC | Age: 50
End: 2020-08-27
Payer: COMMERCIAL

## 2020-08-27 ENCOUNTER — PATIENT OUTREACH (OUTPATIENT)
Dept: ADMINISTRATIVE | Facility: OTHER | Age: 50
End: 2020-08-27

## 2020-08-27 VITALS
TEMPERATURE: 97 F | WEIGHT: 162.81 LBS | SYSTOLIC BLOOD PRESSURE: 130 MMHG | BODY MASS INDEX: 26.17 KG/M2 | HEIGHT: 66 IN | DIASTOLIC BLOOD PRESSURE: 82 MMHG | HEART RATE: 90 BPM

## 2020-08-27 DIAGNOSIS — E04.1 THYROID NODULE: Primary | ICD-10-CM

## 2020-08-27 PROCEDURE — 99204 OFFICE O/P NEW MOD 45 MIN: CPT | Mod: S$GLB,,, | Performed by: INTERNAL MEDICINE

## 2020-08-27 PROCEDURE — 99999 PR PBB SHADOW E&M-EST. PATIENT-LVL IV: CPT | Mod: PBBFAC,,, | Performed by: INTERNAL MEDICINE

## 2020-08-27 PROCEDURE — 99204 PR OFFICE/OUTPT VISIT, NEW, LEVL IV, 45-59 MIN: ICD-10-PCS | Mod: S$GLB,,, | Performed by: INTERNAL MEDICINE

## 2020-08-27 PROCEDURE — 99999 PR PBB SHADOW E&M-EST. PATIENT-LVL IV: ICD-10-PCS | Mod: PBBFAC,,, | Performed by: INTERNAL MEDICINE

## 2020-08-27 NOTE — LETTER
August 27, 2020      Stoney Stack MD  [address] [place] [address]           [facility]  [address]  [facility] [address]  Phone: [phone]          Patient: Dayo Cortes   MR Number: [id number]   YOB: 1970   Date of Visit: 8/27/2020       Dear Dr. Stoney Stack:    Thank you for referring Dayo Cortes to me for evaluation. Attached you will find relevant portions of my assessment and plan of care.    If you have questions, please do not hesitate to call me. I look forward to following Dayo Cortes along with you.    Sincerely,    Vicente Duke MD    Enclosure  CC:  No Recipients    If you would like to receive this communication electronically, please contact [email or web address] or [phone] to request more information on [employer] Link access.    For providers and/or their staff who would like to refer a patient to [facility], please contact us through our one-stop-shop provider referral line, [facility], at [phone].    If you feel you have received this communication in error or would no longer like to receive these types of communications, please e-mail [email or web address]

## 2020-08-27 NOTE — ASSESSMENT & PLAN NOTE
Multiple thyroid nodules seen on last US   - stable from last year   - on personal review of images, most are <1cm   - does have 2 at/above 1 cm in size, both appear predominantly isoechoic. Discussed with patient these do not meet FNA criteria at this time   - recommend repeat ultrasound after 1 year or so. If they remain stable, would spread out ultrasound to 2 years and then consider stopping.   - reviewed patient labs, TSH normal, discussed with her that nodules and thyroid function are separate and her thyroid function is fine.    Discussed with patient that with the size of these nodules, unlikely to contribute to her neck enlargement/swelling, definitely wouldn't cause pain in the back of the neck or lower back. Encourage pt to keep f/u with PCP, can consider ENT evaluation to f/u on parotid gland abnormality the US found as well as maybe different imaging or PT for the lower back. Pt expressed understanding

## 2020-08-27 NOTE — PROGRESS NOTES
Subjective:      Chief Complaint: Thyroid Nodule    HPI: Dayo Cortes is a 49 y.o. female who is here for an initial evaluation for thyroid.    With regards to the thyroid nodule:  The thyroid nodule was found about a year ago. Had throat swelling, neck pain in the back. Got US 2019 with nodules, then repeat in 2020.    Last ultrasound: 8/22/2020   on review, multiple nodules, most small.    Left side 1 cm, mostly isoechoic. Also had a 1.24 cm isoechoic nodule on the left. Rest were less than 1 cm in size.    FNA done? none  Lab Results   Component Value Date    TSH 0.638 08/22/2020    B4CZOHX 5.7 10/03/2016    FREET4 1.14 06/18/2019     Patient denies: difficulty breathing or swallowing, voice changes, FH of thyroid cancer, personal history of radiation exposure, diarrhea/constipation and palpitations    Patient reports:   neck swelling, neck pains, lower back pain. Stiffness in the lower back. Hurts more at night. Gained weight since pandemic. Hot flashes. Sometimes chest discomfort. Dry mouth, gritty feeling in eyes sometimes.    Reviewed past medical, family, social history and updated as appropriate.    Review of Systems   Constitutional: Negative for unexpected weight change.   HENT: Negative for trouble swallowing.         Neck swelling, dry mouth   Eyes: Positive for itching (dry eyes). Negative for visual disturbance.   Respiratory: Negative for shortness of breath.    Cardiovascular: Negative for palpitations.        Rare intermittent discomforts lasting a few seconds at a time   Gastrointestinal: Positive for nausea (sometimes discomfort). Negative for constipation and diarrhea.   Endocrine:        Hot flashes sometimes   Genitourinary: Negative for menstrual problem (hysterectomy 2019).   Musculoskeletal: Positive for back pain and neck pain.   Neurological: Negative for light-headedness.     Objective:     Vitals:    08/27/20 1346   BP: 130/82   Pulse: 90   Temp: 97 °F (36.1 °C)     BP Readings from  Last 5 Encounters:   08/27/20 130/82   08/10/20 122/74   03/11/20 (!) 130/92   02/13/20 (!) 142/92   12/12/19 110/80     Physical Exam  Vitals signs reviewed.   Constitutional:       Appearance: She is well-developed.   HENT:      Head: Normocephalic and atraumatic.   Neck:      Musculoskeletal: Normal range of motion and neck supple. Muscular tenderness (mild) present.      Thyroid: Thyromegaly present.      Comments: Some lymph nodes present  Cardiovascular:      Rate and Rhythm: Normal rate and regular rhythm.      Heart sounds: No murmur.   Pulmonary:      Effort: Pulmonary effort is normal.      Breath sounds: Normal breath sounds.   Abdominal:      General: There is no distension.      Palpations: Abdomen is soft. There is no mass.      Tenderness: There is no abdominal tenderness.   Musculoskeletal: Normal range of motion.         General: No tenderness.   Neurological:      Mental Status: She is alert and oriented to person, place, and time.   Psychiatric:         Judgment: Judgment normal.         Wt Readings from Last 5 Encounters:   08/27/20 1346 73.9 kg (162 lb 13 oz)   08/10/20 1633 71.9 kg (158 lb 8.2 oz)   03/11/20 0947 69.1 kg (152 lb 5.4 oz)   02/13/20 1421 67.6 kg (149 lb)   12/12/19 1006 70.2 kg (154 lb 12.2 oz)       Lab Results   Component Value Date    HGBA1C 5.1 11/04/2016     Lab Results   Component Value Date    CHOL 230 (H) 05/30/2020    CHOL 181 03/14/2019    HDL 89 (H) 05/30/2020    HDL 63 03/14/2019    LDLCALC 129.4 05/30/2020    LDLCALC 111.2 03/14/2019    TRIG 58 05/30/2020    TRIG 34 03/14/2019    CHOLHDL 38.7 05/30/2020    CHOLHDL 34.8 03/14/2019     Lab Results   Component Value Date     08/22/2020    K 4.9 08/22/2020     08/22/2020    CO2 27 08/22/2020    GLU 90 08/22/2020    BUN 23 (H) 08/22/2020    CREATININE 0.9 08/22/2020    CALCIUM 9.6 08/22/2020    PROT 7.5 08/22/2020    ALBUMIN 4.2 08/22/2020    BILITOT 0.3 08/22/2020    ALKPHOS 78 08/22/2020    AST 19 08/22/2020     ALT 18 08/22/2020    ANIONGAP 8 08/22/2020    ESTGFRAFRICA >60 08/22/2020    EGFRNONAA >60 08/22/2020    TSH 0.638 08/22/2020      No results found for: MICALBCREAT    Assessment/Plan:     Thyroid nodule  Multiple thyroid nodules seen on last US   - stable from last year   - on personal review of images, most are <1cm   - does have 2 at/above 1 cm in size, both appear predominantly isoechoic. Discussed with patient these do not meet FNA criteria at this time   - recommend repeat ultrasound after 1 year or so. If they remain stable, would spread out ultrasound to 2 years and then consider stopping.   - reviewed patient labs, TSH normal, discussed with her that nodules and thyroid function are separate and her thyroid function is fine.    Discussed with patient that with the size of these nodules, unlikely to contribute to her neck enlargement/swelling, definitely wouldn't cause pain in the back of the neck or lower back. Encourage pt to keep f/u with PCP, can consider ENT evaluation to f/u on parotid gland abnormality the US found as well as maybe different imaging or PT for the lower back. Pt expressed understanding      Follow up in about 1 year (around 8/27/2021) for further monitoring, imaging review.      Vicente Duke MD  Endocrinology

## 2020-08-27 NOTE — PATIENT INSTRUCTIONS
For the thyroid, your nodules are fairly small. Don't need any biopsy/procedures now. These nodule also won't cause any of the back of the neck or lower back symptoms. Recheck ultrasound in 1 year.    For your symptoms, would encourage to keep up with primary care. Can consider ENT evaluation for the throat/neck symptoms, maybe rheumatology for the back/joint stiffness vs trial of physical therapy.      Common Thyroid Problems     The thyroid is a gland in the neck. It makes thyroid hormone. A hormone is a chemical messenger for the body. If there is a problem with the thyroid, the level of thyroid hormone may change. This can lead to symptoms.  Hypothyroidism  This is when the thyroid gland doesnt make enough hormone. The most common cause of hypothyroidism is Hashimoto thyroiditis. This occurs when the bodys immune system attacks the thyroid gland. Hypothyroidism may also occur if theres a severe deficiency of iodine in the body. The thyroid needs iodine to make hormone. Problems with the pituitary gland can lead to not enough production of thyroid hormone. Hypothyroidism will also occur if the thyroid gland is removed during surgery.  Common symptoms include:  · Low energy and tiredness  · Depression  · Feeling cold  · Muscle pain  · Slowed thinking      · Constipation  · Heavier menstrual periods with prolonged bleeding   · Weight gain  · Dry and brittle skin, hair, nails  · Excessive sleepiness  Hyperthyroidism  This is when the thyroid gland makes too much hormone. The most common cause is Graves disease. This is due to the bodys immune system telling the thyroid to make too much hormone. Another cause is a small bump (nodule) in the thyroid gland. This can cause hyperthyroidism if the cells in the nodule make too much thyroid hormone and stop hormone production in the rest of the thyroid gland.  Common symptoms include:  · Shaking, nervousness, irritability  · Feeling hot  · A rapid, irregular  heartbeat  · Muscle weakness, fatigue  · More frequent bowel movements  · [place], lighter menstrual periods  · Weight loss  · Hair loss  · Bulging eyes  Nodules  Nodules are lumps of tissue in the thyroid gland. Most often, the cause of nodules isnt known. But they may be more common in people whove had medical radiation to the head or neck. Sometimes nodules can be felt on the outside of the neck. Most of the time, cause no symptoms and dont affect the amount of thyroid hormone. Most nodules are not cancer. But sometimes a nodule may be cancer.  What is a goiter?  A goiter is the enlargement of the thyroid gland. When the gland enlarges, you may see or feel a swelling on your neck. A goiter can develop in someone with a normal thyroid, overactive thyroid, or underactive thyroid.   Date Last Reviewed: 11/1/2016  © 1334-8520 [employer]. [address] [place] [address], [place] [address]. All rights reserved. This information is not intended as a substitute for professional medical care. Always follow your healthcare professional's instructions.

## 2020-08-27 NOTE — PROGRESS NOTES
Chart was reviewed for overdue Proactive [facility] Encounters ([facility])  topics  Updates were unable to be urequested from care everywhere  Health Maintenance has been updated  LINKS immunization registry triggered

## 2020-10-21 ENCOUNTER — PATIENT OUTREACH (OUTPATIENT)
Dept: ADMINISTRATIVE | Facility: OTHER | Age: 50
End: 2020-10-21

## 2020-10-21 NOTE — PROGRESS NOTES
Chart was reviewed for overdue Proactive [facility] Encounters ([facility])  topics  Updates were unable to be requested from care everywhere  Health Maintenance has been updated  LINKS immunization registry triggered

## 2020-10-22 ENCOUNTER — OFFICE VISIT (OUTPATIENT)
Dept: SPINE | Facility: CLINIC | Age: 50
End: 2020-10-22
Payer: COMMERCIAL

## 2020-10-22 VITALS — WEIGHT: 162.94 LBS | HEIGHT: 66 IN | BODY MASS INDEX: 26.19 KG/M2

## 2020-10-22 DIAGNOSIS — M54.9 DORSALGIA, UNSPECIFIED: ICD-10-CM

## 2020-10-22 DIAGNOSIS — G89.29 CHRONIC BILATERAL LOW BACK PAIN WITHOUT SCIATICA: Primary | ICD-10-CM

## 2020-10-22 DIAGNOSIS — M54.50 CHRONIC BILATERAL LOW BACK PAIN WITHOUT SCIATICA: Primary | ICD-10-CM

## 2020-10-22 PROCEDURE — 3008F PR BODY MASS INDEX (BMI) DOCUMENTED: ICD-10-PCS | Mod: CPTII,S$GLB,, | Performed by: PHYSICAL MEDICINE & REHABILITATION

## 2020-10-22 PROCEDURE — 99204 PR OFFICE/OUTPT VISIT, NEW, LEVL IV, 45-59 MIN: ICD-10-PCS | Mod: S$GLB,,, | Performed by: PHYSICAL MEDICINE & REHABILITATION

## 2020-10-22 PROCEDURE — 3008F BODY MASS INDEX DOCD: CPT | Mod: CPTII,S$GLB,, | Performed by: PHYSICAL MEDICINE & REHABILITATION

## 2020-10-22 PROCEDURE — 99204 OFFICE O/P NEW MOD 45 MIN: CPT | Mod: S$GLB,,, | Performed by: PHYSICAL MEDICINE & REHABILITATION

## 2020-10-22 RX ORDER — METHOCARBAMOL 500 MG/1
500 TABLET, FILM COATED ORAL 2 TIMES DAILY PRN
Qty: 30 TABLET | Refills: 1 | Status: SHIPPED | OUTPATIENT
Start: 2020-10-22 | End: 2020-11-01

## 2020-10-22 NOTE — LETTER
October 22, 2020      Stoney Stack MD  [address]  [place] [address]             [facility]  [address] [place] [address] [address]  [place] [address]  Phone: [phone]  Fax: [phone]          Patient: Dayo Cortes   MR Number: [id number]   YOB: 1970   Date of Visit: 10/22/2020       Dear Dr. Stoney Stack:    Thank you for referring Dayo Cortes to me for evaluation. Attached you will find relevant portions of my assessment and plan of care.    If you have questions, please do not hesitate to call me. I look forward to following Dayo Cortes along with you.    Sincerely,    Fabricio Narayanan MD    Enclosure  CC:  No Recipients    If you would like to receive this communication electronically, please contact [email or web address] or [phone] to request more information on [employer] Link access.    For providers and/or their staff who would like to refer a patient to [facility], please contact us through our one-stop-shop provider referral line, [facility], at [phone].    If you feel you have received this communication in error or would no longer like to receive these types of communications, please e-mail [email or web address]

## 2020-10-22 NOTE — PROGRESS NOTES
SUBJECTIVE:    Patient ID: Dayo Cortes is a 49 y.o. female.    Chief Complaint: Back Pain    This is a 49-year-old woman who sees Dr. Stack for her primary care.  She denies any chronic major medical problems.  No personal history of cancer.  Long history of low back pain.  Presents complaining of bilateral low back pain at the lumbosacral junction radiating into the upper gluteal region on both sides.  It is a dull persistent discomfort.  Worse at the end of the day.  Keeps her from sleeping well.  Her 1st thing in the morning and gets better as the day goes on.  She is very conscious of her posture.  She plans on getting a new mattress.  She says the 1 that he has is too soft.  She is not having any radicular leg pain or weakness.  No bowel or bladder dysfunction fever chills sweats or unexpected weight loss.  She is managing her symptoms with Tylenol and Aleve.  She had x-rays of the thoracic and lumbar spine in 2015 that were normal.  She had x-rays of the cervical spine in 2016 showing multilevel degenerative changes.  She is not having any neck or midthoracic discomfort at this point.  She tried physical therapy several years ago with no significant improvement.  Current pain level is 4/10 but gets as bad as 8/10 at times        Past Medical History:   Diagnosis Date    Abnormal Pap smear 2006    once but repeated pap smear and the resulted WNLs.    Abnormal Pap smear of cervix     early 30 's became normal     Social History     Socioeconomic History    Marital status: Single     Spouse name: Not on file    Number of children: Not on file    Years of education: Not on file    Highest education level: Not on file   Occupational History    Not on file   Social Needs    Financial resource strain: Not on file    Food insecurity     Worry: Not on file     Inability: Not on file    Transportation needs     Medical: Not on file     Non-medical: Not on file   Tobacco Use    Smoking status: Never  "Smoker    Smokeless tobacco: Never Used   Substance and Sexual Activity    Alcohol use: Yes     Frequency: Monthly or less     Drinks per session: Patient refused     Binge frequency: Patient refused     Comment: occasional    Drug use: No    Sexual activity: Yes     Partners: Male     Birth control/protection: Condom   Lifestyle    Physical activity     Days per week: Not on file     Minutes per session: Not on file    Stress: Not on file   Relationships    Social connections     Talks on phone: Not on file     Gets together: Not on file     Attends Anabaptist service: Not on file     Active member of club or organization: Not on file     Attends meetings of clubs or organizations: Not on file     Relationship status: Not on file   Other Topics Concern    Not on file   Social History Narrative    Not on file     Past Surgical History:   Procedure Laterality Date    COLONOSCOPY N/A 6/7/2017    Procedure: COLONOSCOPY;  Surgeon: Win Rodriguez MD;  [facility];  Service: Endoscopy;  Laterality: N/A;     Family History   Problem Relation Age of Onset    Breast cancer Mother 40        unconfirmed.    Diabetes Maternal Grandmother     Thyroid disease Maternal Aunt         nodules and hashimotos    Ovarian cancer Neg Hx     Colon cancer Neg Hx      Vitals:    10/22/20 1138   Weight: 73.9 kg (162 lb 14.7 oz)   Height: 5' 6" (1.676 m)       Review of Systems   Constitutional: Negative for chills, diaphoresis, fatigue, fever and unexpected weight change.   HENT: Negative for trouble swallowing.    Eyes: Negative for visual disturbance.   Respiratory: Negative for shortness of breath.    Cardiovascular: Negative for chest pain.   Gastrointestinal: Negative for abdominal pain, constipation, nausea and vomiting.   Genitourinary: Negative for difficulty urinating.   Musculoskeletal: Negative for arthralgias, back pain, gait problem, joint swelling, myalgias, neck pain and neck stiffness.   Neurological: " Negative for dizziness, speech difficulty, weakness, light-headedness, numbness and headaches.          Objective:      Physical Exam  Neurological:      Mental Status: She is alert and oriented to person, place, and time.      Comments: She is awake and in no acute distress  Mild tenderness palpation lumbar paraspinous musculature with no palpable masses  Forward flexion is to about 60° before she complains of pain at the lumbosacral junction.  Extension at 15° causes mild pain at the lumbosacral junction  She can heel and toe walk normally  Deep tendon reflexes +2 at both knees and +1 at both ankles  Strength is normal in both lower extremities  Straight leg raise negative bilaterally  KAREN test negative bilaterally             Assessment:       1. Chronic bilateral low back pain without sciatica    2. Dorsalgia, unspecified           Plan:     she has a nonfocal examination from a neurological standpoint and no historical red flags.  She has low back pain most likely on the basis of degenerative disc disease.  She has had no success with physical therapy in the past and is interested in more aggressive treatment.  I will get her scheduled for an MRI of the lumbar spine in preparation for epidural steroid injection versus facet joint injection or radiofrequency ablation.  She can follow up with me after the scan.  I will add Robaxin at night to see if that helps with sleep      Chronic bilateral low back pain without sciatica    Dorsalgia, unspecified  -     MRI Lumbar Spine Without Contrast; Future; Expected date: 10/22/2020    Other orders  -     methocarbamoL (ROBAXIN) 500 MG Tab; Take 1 tablet (500 mg total) by mouth 2 (two) times daily as needed.  Dispense: 30 tablet; Refill: 1

## 2020-11-03 ENCOUNTER — HOSPITAL ENCOUNTER (OUTPATIENT)
Dept: RADIOLOGY | Facility: HOSPITAL | Age: 50
Discharge: HOME | End: 2020-11-03
Attending: PHYSICAL MEDICINE & REHABILITATION
Payer: COMMERCIAL

## 2020-11-03 DIAGNOSIS — M54.9 DORSALGIA, UNSPECIFIED: ICD-10-CM

## 2020-11-03 PROCEDURE — 72148 MRI LUMBAR SPINE WITHOUT CONTRAST: ICD-10-PCS | Mod: 26,,, | Performed by: RADIOLOGY

## 2020-11-03 PROCEDURE — 72148 MRI LUMBAR SPINE W/O DYE: CPT | Mod: 26,,, | Performed by: RADIOLOGY

## 2020-11-03 PROCEDURE — 72148 MRI LUMBAR SPINE W/O DYE: CPT | Mod: TC

## 2020-11-05 ENCOUNTER — TELEPHONE (OUTPATIENT)
Dept: PAIN MEDICINE | Facility: CLINIC | Age: 50
End: 2020-11-05

## 2020-11-05 ENCOUNTER — OFFICE VISIT (OUTPATIENT)
Dept: SPINE | Facility: CLINIC | Age: 50
End: 2020-11-05
Payer: COMMERCIAL

## 2020-11-05 VITALS — BODY MASS INDEX: 26.19 KG/M2 | HEIGHT: 66 IN | WEIGHT: 162.94 LBS

## 2020-11-05 DIAGNOSIS — G89.29 CHRONIC BILATERAL LOW BACK PAIN WITHOUT SCIATICA: Primary | ICD-10-CM

## 2020-11-05 DIAGNOSIS — M54.50 CHRONIC BILATERAL LOW BACK PAIN WITHOUT SCIATICA: Primary | ICD-10-CM

## 2020-11-05 PROCEDURE — 3008F BODY MASS INDEX DOCD: CPT | Mod: CPTII,S$GLB,, | Performed by: PHYSICAL MEDICINE & REHABILITATION

## 2020-11-05 PROCEDURE — 3008F PR BODY MASS INDEX (BMI) DOCUMENTED: ICD-10-PCS | Mod: CPTII,S$GLB,, | Performed by: PHYSICAL MEDICINE & REHABILITATION

## 2020-11-05 PROCEDURE — 99213 OFFICE O/P EST LOW 20 MIN: CPT | Mod: S$GLB,,, | Performed by: PHYSICAL MEDICINE & REHABILITATION

## 2020-11-05 PROCEDURE — 99213 PR OFFICE/OUTPT VISIT, EST, LEVL III, 20-29 MIN: ICD-10-PCS | Mod: S$GLB,,, | Performed by: PHYSICAL MEDICINE & REHABILITATION

## 2020-11-05 NOTE — TELEPHONE ENCOUNTER
----- Message from Lon Castañeda sent at 11/5/2020  2:13 PM CST -----  Type: Needs Medical Advice    Who Called:  Patient  Best Call Back Number: [phone]  Additional Information: Patient would like to discuss scheduling procedure. Please call to advise. Thanks!

## 2020-11-05 NOTE — TELEPHONE ENCOUNTER
----- Message from Fabricio Narayanan MD sent at 11/5/2020 11:06 AM CST -----  Please schedule for facet joint injection bilaterally L4-5 and L5-S1

## 2020-11-05 NOTE — H&P (VIEW-ONLY)
SUBJECTIVE:    Patient ID: Dayo Cortes is a 50 y.o. female.    Chief Complaint: Back Pain    She is here to review her lumbar MRI which was done on 11/03/2020 to evaluate her complaint of bilateral low back pain at the lumbosacral junction radiating into the gluteal region.    The MRI is summarized below:    Impression:     Multilevel degenerative changes of the lumbar spine, as described in detail above, most pronounced at L4-5 and contribute to severe left and moderate right neural foraminal stenosis and mild-to-moderate spinal canal stenosis.  Severe bilateral neural foraminal stenosis at L5-S1 noted.      Clinically she is about the same.  Pain level 6/10.  Robaxin did not help her sleep any better.        Past Medical History:   Diagnosis Date    Abnormal Pap smear 2006    once but repeated pap smear and the resulted WNLs.    Abnormal Pap smear of cervix     early 30 's became normal     Social History     Socioeconomic History    Marital status: Single     Spouse name: Not on file    Number of children: Not on file    Years of education: Not on file    Highest education level: Not on file   Occupational History    Not on file   Social Needs    Financial resource strain: Not on file    Food insecurity     Worry: Not on file     Inability: Not on file    Transportation needs     Medical: Not on file     Non-medical: Not on file   Tobacco Use    Smoking status: Never Smoker    Smokeless tobacco: Never Used   Substance and Sexual Activity    Alcohol use: Yes     Frequency: Monthly or less     Drinks per session: Patient refused     Binge frequency: Patient refused     Comment: occasional    Drug use: No    Sexual activity: Yes     Partners: Male     Birth control/protection: Condom   Lifestyle    Physical activity     Days per week: Not on file     Minutes per session: Not on file    Stress: Not on file   Relationships    Social connections     Talks on phone: Not on file     Gets together:  "Not on file     Attends Shinto service: Not on file     Active member of club or organization: Not on file     Attends meetings of clubs or organizations: Not on file     Relationship status: Not on file   Other Topics Concern    Not on file   Social History Narrative    Not on file     Past Surgical History:   Procedure Laterality Date    COLONOSCOPY N/A 6/7/2017    Procedure: COLONOSCOPY;  Surgeon: Win Rodriguez MD;  Location: [facility];  Service: Endoscopy;  Laterality: N/A;     Family History   Problem Relation Age of Onset    Breast cancer Mother 40        unconfirmed.    Diabetes Maternal Grandmother     Thyroid disease Maternal Aunt         nodules and hashimotos    Ovarian cancer Neg Hx     Colon cancer Neg Hx      Vitals:    11/05/20 1040   Weight: 73.9 kg (162 lb 14.7 oz)   Height: 5' 6" (1.676 m)       Review of Systems   Constitutional: Negative for chills, diaphoresis, fatigue, fever and unexpected weight change.   HENT: Negative for trouble swallowing.    Eyes: Negative for visual disturbance.   Respiratory: Negative for shortness of breath.    Cardiovascular: Negative for chest pain.   Gastrointestinal: Negative for abdominal pain, constipation, nausea and vomiting.   Genitourinary: Negative for difficulty urinating.   Musculoskeletal: Negative for arthralgias, back pain, gait problem, joint swelling, myalgias, neck pain and neck stiffness.   Neurological: Negative for dizziness, speech difficulty, weakness, light-headedness, numbness and headaches.          Objective:      Physical Exam  Neurological:      Mental Status: She is alert and oriented to person, place, and time.             Assessment:       1. Chronic bilateral low back pain without sciatica           Plan:     we are going to try to get her symptomatic relief with facet injections bilaterally L4-5 and L5-S1.  Follow-up with me after the procedure.  She is going to try some Aleve p.m..  See if that helps with sleep    "   Chronic bilateral low back pain without sciatica

## 2020-11-06 DIAGNOSIS — Z01.818 PREOP TESTING: ICD-10-CM

## 2020-11-06 DIAGNOSIS — M47.896 OTHER SPONDYLOSIS, LUMBAR REGION: Primary | ICD-10-CM

## 2020-11-13 RX ORDER — OMEPRAZOLE 10 MG/1
10 CAPSULE, DELAYED RELEASE ORAL DAILY
COMMUNITY
End: 2020-12-11

## 2020-11-16 ENCOUNTER — LAB VISIT (OUTPATIENT)
Dept: PRIMARY CARE CLINIC | Facility: CLINIC | Age: 50
End: 2020-11-16
Payer: COMMERCIAL

## 2020-11-16 DIAGNOSIS — Z01.818 PREOP TESTING: Primary | ICD-10-CM

## 2020-11-16 DIAGNOSIS — Z01.818 PREOP TESTING: ICD-10-CM

## 2020-11-16 LAB — SARS-COV-2 RNA RESP QL NAA+PROBE: NOT DETECTED

## 2020-11-16 PROCEDURE — U0003 INFECTIOUS AGENT DETECTION BY NUCLEIC ACID (DNA OR RNA); SEVERE ACUTE RESPIRATORY SYNDROME CORONAVIRUS 2 (SARS-COV-2) (CORONAVIRUS DISEASE [COVID-19]), AMPLIFIED PROBE TECHNIQUE, MAKING USE OF HIGH THROUGHPUT TECHNOLOGIES AS DESCRIBED BY CMS-2020-01-R: HCPCS

## 2020-11-17 ENCOUNTER — HOSPITAL ENCOUNTER (OUTPATIENT)
Facility: AMBULARY SURGERY CENTER | Age: 50
Discharge: HOME | End: 2020-11-17
Attending: ANESTHESIOLOGY | Admitting: ANESTHESIOLOGY
Payer: COMMERCIAL

## 2020-11-17 DIAGNOSIS — M47.896 OTHER SPONDYLOSIS, LUMBAR REGION: Primary | ICD-10-CM

## 2020-11-17 PROCEDURE — 64494 INJ PARAVERT F JNT L/S 2 LEV: CPT | Mod: LT,,, | Performed by: ANESTHESIOLOGY

## 2020-11-17 PROCEDURE — 64493 PR INJ DX/THER AGNT PARAVERT FACET JOINT,IMG GUIDE,LUMBAR/SAC,1ST LVL: ICD-10-PCS | Mod: LT,,, | Performed by: ANESTHESIOLOGY

## 2020-11-17 PROCEDURE — 64494 INJ PARAVERT F JNT L/S 2 LEV: CPT | Mod: RT | Performed by: ANESTHESIOLOGY

## 2020-11-17 PROCEDURE — 64493 INJ PARAVERT F JNT L/S 1 LEV: CPT | Mod: LT | Performed by: ANESTHESIOLOGY

## 2020-11-17 PROCEDURE — 64494 PR INJ DX/THER AGNT PARAVERT FACET JOINT,IMG GUIDE,LUMBAR/SAC, 2ND LEVEL: ICD-10-PCS | Mod: LT,,, | Performed by: ANESTHESIOLOGY

## 2020-11-17 PROCEDURE — 64493 INJ PARAVERT F JNT L/S 1 LEV: CPT | Mod: LT,,, | Performed by: ANESTHESIOLOGY

## 2020-11-17 RX ORDER — MIDAZOLAM HYDROCHLORIDE 1 MG/ML
INJECTION, SOLUTION INTRAMUSCULAR; INTRAVENOUS
Status: DISCONTINUED | OUTPATIENT
Start: 2020-11-17 | End: 2020-11-17 | Stop reason: HOSPADM

## 2020-11-17 RX ORDER — LIDOCAINE HYDROCHLORIDE 10 MG/ML
INJECTION, SOLUTION EPIDURAL; INFILTRATION; INTRACAUDAL; PERINEURAL
Status: DISCONTINUED | OUTPATIENT
Start: 2020-11-17 | End: 2020-11-17 | Stop reason: HOSPADM

## 2020-11-17 RX ORDER — FENTANYL CITRATE 50 UG/ML
INJECTION, SOLUTION INTRAMUSCULAR; INTRAVENOUS
Status: DISCONTINUED | OUTPATIENT
Start: 2020-11-17 | End: 2020-11-17 | Stop reason: HOSPADM

## 2020-11-17 RX ORDER — SODIUM CHLORIDE, SODIUM LACTATE, POTASSIUM CHLORIDE, CALCIUM CHLORIDE 600; 310; 30; 20 MG/100ML; MG/100ML; MG/100ML; MG/100ML
INJECTION, SOLUTION INTRAVENOUS ONCE AS NEEDED
Status: COMPLETED | OUTPATIENT
Start: 2020-11-17 | End: 2020-11-17

## 2020-11-17 RX ORDER — BUPIVACAINE HYDROCHLORIDE 2.5 MG/ML
INJECTION, SOLUTION EPIDURAL; INFILTRATION; INTRACAUDAL
Status: DISCONTINUED | OUTPATIENT
Start: 2020-11-17 | End: 2020-11-17 | Stop reason: HOSPADM

## 2020-11-17 RX ORDER — METHYLPREDNISOLONE ACETATE 80 MG/ML
INJECTION, SUSPENSION INTRA-ARTICULAR; INTRALESIONAL; INTRAMUSCULAR; SOFT TISSUE
Status: DISCONTINUED | OUTPATIENT
Start: 2020-11-17 | End: 2020-11-17 | Stop reason: HOSPADM

## 2020-11-17 RX ADMIN — SODIUM CHLORIDE, SODIUM LACTATE, POTASSIUM CHLORIDE, CALCIUM CHLORIDE: 600; 310; 30; 20 INJECTION, SOLUTION INTRAVENOUS at 12:11

## 2020-11-17 NOTE — INTERVAL H&P NOTE
The patient has been examined and the H&P has been reviewed:    I concur with the findings and no changes have occurred since H&P was written.    Surgery risks, benefits and alternative options discussed and understood by patient/family.  This patient has been cleared for surgery in an ambulatory surgical facility    ASA 3,  Mallampatti Score 3  No history of anesthetic complications  Plan for RN IV sedation          Active Hospital Problems    Diagnosis  POA    Other spondylosis, lumbar region [M47.896]  Yes      Resolved Hospital Problems   No resolved problems to display.

## 2020-11-17 NOTE — OP NOTE
PROCEDURE DATE: 11/17/2020    PROCEDURE: Bilateral L4/5 and L5/S1 facet joint injection under fluoroscopy.    Diagnosis: Other Lumbar spondylosis    PHYSICIAN: Sina Allen MD    MEDICATIONS INJECTED:  From a mixture of 3 ml of 0.25% bupivicaine and 80mg of methylprednisone, 0.9ml of solution was injected into each facet joint.    LOCAL ANESTHETIC USED:   Lidocaine 1%, 2 ml per level.    SEDATION MEDICATIONS: RN IV sedation    ESTIMATED BLOOD LOSS:  None    COMPLICATIONS:  None    TECHNIQUE:   A time-out was taken to identify the patient and procedure side prior to starting the procedure.  Lying in a prone position, the patient was prepped and draped in the usual sterile fashion using ChloraPrep and fenestrated drape.  The levels were determined under fluoroscopic guidance in the AP view and then rotated into the oblique view to visualize the joint space.  Local anesthetic was given by raising a wheal at the skin and then anesthetizing a tract using a 25-gauge, 1.5inch needle.  A 3.5 in 22-gauge needle was introduced into the bilateral L4/5 and L5/S1 facet joints.  Negative pressure applied to make sure that there was no intravascular placement.  1ml contrast was injected to confirm placement and to confirm arthrogram and no vascular uptake.  Medication was then injected slowly.  The patient tolerated the procedure well.    The patient was monitored after the procedure.  Patient was given post procedure and discharge instructions to follow at home. The patient was discharged in a stable condition

## 2020-11-17 NOTE — PLAN OF CARE
Patient awake alert and says she is ready to go home. She denies pain, nausea weakness or dizziness. Vital signs and injection sites stable. All pt belongings listed on pre op check list have been returned to patient. She is holding her purse and cell phone. Patient's brother present and states he is ready to take pt home and he is driving.

## 2020-11-17 NOTE — DISCHARGE SUMMARY
[facility]  Discharge Note  Short Stay    Procedure(s) (LRB):  facet joint injection (Bilateral)    OUTCOME: Patient tolerated treatment/procedure well without complication and is now ready for discharge.    DISPOSITION: Home or Self Care    FINAL DIAGNOSIS:  Other spondylosis, lumbar region    FOLLOWUP: In clinic       DISCHARGE INSTRUCTIONS:    Discharge Procedure Orders   Notify your health care provider if you experience any of the following:  temperature >100.4     Notify your health care provider if you experience any of the following:  severe uncontrolled pain     Notify your health care provider if you experience any of the following:  redness, tenderness, or signs of infection (pain, swelling, redness, odor or green/yellow discharge around incision site)     Activity as tolerated

## 2020-11-18 VITALS
OXYGEN SATURATION: 100 % | BODY MASS INDEX: 26.3 KG/M2 | RESPIRATION RATE: 18 BRPM | WEIGHT: 162.94 LBS | TEMPERATURE: 98 F | DIASTOLIC BLOOD PRESSURE: 94 MMHG | SYSTOLIC BLOOD PRESSURE: 146 MMHG | HEART RATE: 70 BPM

## 2020-12-11 ENCOUNTER — OFFICE VISIT (OUTPATIENT)
Dept: FAMILY MEDICINE | Facility: CLINIC | Age: 50
End: 2020-12-11
Payer: COMMERCIAL

## 2020-12-11 VITALS
WEIGHT: 165.56 LBS | TEMPERATURE: 97 F | RESPIRATION RATE: 16 BRPM | DIASTOLIC BLOOD PRESSURE: 82 MMHG | BODY MASS INDEX: 26.61 KG/M2 | OXYGEN SATURATION: 98 % | HEART RATE: 89 BPM | HEIGHT: 66 IN | SYSTOLIC BLOOD PRESSURE: 112 MMHG

## 2020-12-11 DIAGNOSIS — L25.9 CONTACT DERMATITIS, UNSPECIFIED CONTACT DERMATITIS TYPE, UNSPECIFIED TRIGGER: ICD-10-CM

## 2020-12-11 DIAGNOSIS — Z00.00 HEALTHCARE MAINTENANCE: Primary | ICD-10-CM

## 2020-12-11 PROCEDURE — 99396 PR PREVENTIVE VISIT,EST,40-64: ICD-10-PCS | Mod: S$GLB,,, | Performed by: FAMILY MEDICINE

## 2020-12-11 PROCEDURE — 99396 PREV VISIT EST AGE 40-64: CPT | Mod: S$GLB,,, | Performed by: FAMILY MEDICINE

## 2020-12-11 PROCEDURE — 99999 PR PBB SHADOW E&M-EST. PATIENT-LVL IV: ICD-10-PCS | Mod: PBBFAC,,, | Performed by: FAMILY MEDICINE

## 2020-12-11 PROCEDURE — 3008F PR BODY MASS INDEX (BMI) DOCUMENTED: ICD-10-PCS | Mod: CPTII,S$GLB,, | Performed by: FAMILY MEDICINE

## 2020-12-11 PROCEDURE — 1126F PR PAIN SEVERITY QUANTIFIED, NO PAIN PRESENT: ICD-10-PCS | Mod: S$GLB,,, | Performed by: FAMILY MEDICINE

## 2020-12-11 PROCEDURE — 3008F BODY MASS INDEX DOCD: CPT | Mod: CPTII,S$GLB,, | Performed by: FAMILY MEDICINE

## 2020-12-11 PROCEDURE — 1126F AMNT PAIN NOTED NONE PRSNT: CPT | Mod: S$GLB,,, | Performed by: FAMILY MEDICINE

## 2020-12-11 PROCEDURE — 99999 PR PBB SHADOW E&M-EST. PATIENT-LVL IV: CPT | Mod: PBBFAC,,, | Performed by: FAMILY MEDICINE

## 2020-12-11 RX ORDER — METHYLPREDNISOLONE 4 MG/1
TABLET ORAL
Qty: 1 PACKAGE | Refills: 0 | Status: SHIPPED | OUTPATIENT
Start: 2020-12-11 | End: 2021-01-01

## 2020-12-11 NOTE — PROGRESS NOTES
Subjective:   Patient ID: Dayo Cortes is a 50 y.o. female     Chief Complaint:Rash      Pt here for checkup. Doing well. Skin rash not improving with benadryl    Review of Systems   Constitutional: Negative for chills and fever.   HENT: Negative for sore throat and trouble swallowing.    Respiratory: Negative for cough and shortness of breath.    Cardiovascular: Negative for chest pain and leg swelling.   Gastrointestinal: Negative for abdominal distention and abdominal pain.   Genitourinary: Negative for dysuria and flank pain.   Musculoskeletal: Negative for arthralgias and back pain.   Skin: Negative for color change and pallor.   Neurological: Negative for weakness and headaches.   Psychiatric/Behavioral: Negative for agitation and confusion.     Past Medical History:   Diagnosis Date    Abnormal Pap smear 2006    once but repeated pap smear and the resulted WNLs.    Abnormal Pap smear of cervix     early 30 's became normal    GERD (gastroesophageal reflux disease)      Past Surgical History:   Procedure Laterality Date    COLONOSCOPY N/A 6/7/2017    Procedure: COLONOSCOPY;  Surgeon: Win Rodriguez MD;  Location: [facility];  Service: Endoscopy;  Laterality: N/A;    HYSTERECTOMY      LUMBAR PARAVERTEBRAL FACET JOINT NERVE BLOCK Bilateral 11/17/2020    Procedure: facet joint injection;  Surgeon: Sina Allen MD;  Location: [facility] OR;  Service: Pain Management;  Laterality: Bilateral;  L4-5, L5-S1     Objective:     Vitals:    12/11/20 0908   BP: 112/82   Pulse: 89   Resp: 16   Temp: 97.4 °F (36.3 °C)     Body mass index is 26.72 kg/m².  Physical Exam  Vitals signs and nursing note reviewed.   Constitutional:       Appearance: She is well-developed.   HENT:      Head: Normocephalic and atraumatic.   Eyes:      General: No scleral icterus.     Conjunctiva/sclera: Conjunctivae normal.   Neck:      Musculoskeletal: Normal range of motion and neck supple.   Cardiovascular:      Heart sounds: No murmur.    Pulmonary:      Effort: Pulmonary effort is normal. No respiratory distress.   Musculoskeletal: Normal range of motion.         General: No deformity.   Skin:     Coloration: Skin is not pale.      Findings: No rash.   Neurological:      Mental Status: She is alert and oriented to person, place, and time.   Psychiatric:         Behavior: Behavior normal.         Thought Content: Thought content normal.         Judgment: Judgment normal.       Assessment:     1. Healthcare maintenance    2. Contact dermatitis, unspecified contact dermatitis type, unspecified trigger      Plan:   Healthcare maintenance  -     Lipid Panel; Future; Expected date: 06/11/2021  -     Comprehensive Metabolic Panel; Future; Expected date: 06/11/2021  -     Hemoglobin A1C; Future; Expected date: 06/11/2021    Contact dermatitis, unspecified contact dermatitis type, unspecified trigger  -     methylPREDNISolone (MEDROL DOSEPACK) 4 mg tablet; use as directed  Dispense: 1 Package; Refill: 0  -     Lipid Panel; Future; Expected date: 06/11/2021  -     Comprehensive Metabolic Panel; Future; Expected date: 06/11/2021  -     Hemoglobin A1C; Future; Expected date: 06/11/2021          Stoney Stack MD  12/11/2020    Portions of this note have been dictated with ODALYS Coyle

## 2020-12-14 ENCOUNTER — PATIENT OUTREACH (OUTPATIENT)
Dept: ADMINISTRATIVE | Facility: OTHER | Age: 50
End: 2020-12-14

## 2020-12-18 ENCOUNTER — OFFICE VISIT (OUTPATIENT)
Dept: URGENT CARE | Facility: CLINIC | Age: 50
End: 2020-12-18
Payer: COMMERCIAL

## 2020-12-18 VITALS
TEMPERATURE: 99 F | RESPIRATION RATE: 18 BRPM | SYSTOLIC BLOOD PRESSURE: 138 MMHG | DIASTOLIC BLOOD PRESSURE: 97 MMHG | OXYGEN SATURATION: 98 % | HEART RATE: 104 BPM

## 2020-12-18 DIAGNOSIS — R53.83 FATIGUE: ICD-10-CM

## 2020-12-18 DIAGNOSIS — Z03.818 ENCOUNTER FOR OBSERVATION FOR SUSPECTED EXPOSURE TO OTHER BIOLOGICAL AGENTS RULED OUT: Primary | ICD-10-CM

## 2020-12-18 DIAGNOSIS — R43.9 PROBLEMS WITH SMELL AND TASTE: ICD-10-CM

## 2020-12-18 DIAGNOSIS — R51.9 HEADACHE: ICD-10-CM

## 2020-12-18 PROCEDURE — U0003 INFECTIOUS AGENT DETECTION BY NUCLEIC ACID (DNA OR RNA); SEVERE ACUTE RESPIRATORY SYNDROME CORONAVIRUS 2 (SARS-COV-2) (CORONAVIRUS DISEASE [COVID-19]), AMPLIFIED PROBE TECHNIQUE, MAKING USE OF HIGH THROUGHPUT TECHNOLOGIES AS DESCRIBED BY CMS-2020-01-R: HCPCS

## 2020-12-18 PROCEDURE — 99204 OFFICE O/P NEW MOD 45 MIN: CPT | Mod: S$GLB,,, | Performed by: EMERGENCY MEDICINE

## 2020-12-18 PROCEDURE — 99204 PR OFFICE/OUTPT VISIT, NEW, LEVL IV, 45-59 MIN: ICD-10-PCS | Mod: S$GLB,,, | Performed by: EMERGENCY MEDICINE

## 2020-12-18 NOTE — PROGRESS NOTES
"Subjective:       Patient ID: Dayo Cortes is a 50 y.o. female.    Vitals:  temperature is 98.5 °F (36.9 °C). Her blood pressure is 138/97 (abnormal) and her pulse is 104. Her respiration is 18 and oxygen saturation is 98%.     Chief Complaint: No chief complaint on file.    Dayo Cortes is a 50 year old female presenting to the clinic for COVID testing. She reports symptom onset yesterday including loss of taste, fatigue, headache, and "brain fog". She denies confusion, slurred speech, change in gait, weakness/numbness. No known exposure to COVID.       Constitution: Positive for fatigue. Negative for chills and fever.   HENT: Negative for congestion and sore throat.         Loss of taste   Neck: Negative for painful lymph nodes.   Cardiovascular: Negative for chest pain and leg swelling.   Eyes: Negative for double vision and blurred vision.   Respiratory: Negative for cough and shortness of breath.    Gastrointestinal: Negative for nausea, vomiting and diarrhea.   Genitourinary: Negative for dysuria, frequency, urgency and history of kidney stones.   Musculoskeletal: Negative for joint pain, joint swelling, muscle cramps and muscle ache.   Skin: Negative for color change, pale, rash and bruising.   Allergic/Immunologic: Negative for seasonal allergies.   Neurological: Positive for headaches. Negative for dizziness, history of vertigo, light-headedness and passing out.   Hematologic/Lymphatic: Negative for swollen lymph nodes.   Psychiatric/Behavioral: Negative for nervous/anxious, sleep disturbance and depression. The patient is not nervous/anxious.        Objective:      Physical Exam   Constitutional: She is oriented to person, place, and time. She appears well-developed. She is cooperative.  Non-toxic appearance. She does not appear ill. No distress.   HENT:   Head: Normocephalic and atraumatic.   Ears:   Right Ear: Hearing, tympanic membrane, external ear and ear canal normal.   Left Ear: Hearing, " tympanic membrane, external ear and ear canal normal.   Nose: Nose normal. No mucosal edema, rhinorrhea or nasal deformity. No epistaxis. Right sinus exhibits no maxillary sinus tenderness and no frontal sinus tenderness. Left sinus exhibits no maxillary sinus tenderness and no frontal sinus tenderness.   Mouth/Throat: Uvula is midline, oropharynx is clear and moist and mucous membranes are normal. No trismus in the jaw. Normal dentition. No uvula swelling. No posterior oropharyngeal erythema.   Eyes: Pupils are equal, round, and reactive to light. Conjunctivae and lids are normal. Right eye exhibits no discharge. Left eye exhibits no discharge. No scleral icterus. extraocular movement intact  Neck: Trachea normal, normal range of motion, full passive range of motion without pain and phonation normal. Neck supple.   Cardiovascular: Normal rate, regular rhythm, normal heart sounds and normal pulses.   Pulmonary/Chest: Effort normal and breath sounds normal. No respiratory distress.   Abdominal: Normal appearance.   Musculoskeletal: Normal range of motion.         General: No deformity.   Neurological: She is alert and oriented to person, place, and time. She has normal motor skills, normal sensation and intact cranial nerves. She exhibits normal muscle tone. Gait normal. Coordination normal. GCS eye subscore is 4. GCS verbal subscore is 5. GCS motor subscore is 6.      Comments: Cranial nerves 3-12 intact. 5/5 strength in bilateral upper and lower extremities   Skin: Skin is warm, dry, intact, not diaphoretic and not pale. Psychiatric: Her speech is normal and behavior is normal. Judgment and thought content normal.   Nursing note and vitals reviewed.        Assessment:       1. Encounter for observation for suspected exposure to other biological agents ruled out    2. Problems with smell and taste    3. Fatigue    4. Headache        Plan:       The patient appears well hydrated and nontoxic. In no distress while in  clinic. Normal neuro exam but advised to go to the ER if symptoms worsen. Instructed to quarantine until test is resulted since her symptoms are consistent with COVID. Follow up as needed.     Encounter for observation for suspected exposure to other biological agents ruled out    Problems with smell and taste    Fatigue    Headache    Other orders  -     COVID-19 Routine Screening  -     COVID-19 Routine Screening

## 2020-12-20 LAB — SARS-COV-2 RNA RESP QL NAA+PROBE: NOT DETECTED

## 2020-12-21 ENCOUNTER — OFFICE VISIT (OUTPATIENT)
Dept: SPINE | Facility: CLINIC | Age: 50
End: 2020-12-21
Payer: COMMERCIAL

## 2020-12-21 VITALS — HEIGHT: 66 IN | BODY MASS INDEX: 26.61 KG/M2 | WEIGHT: 165.56 LBS

## 2020-12-21 DIAGNOSIS — G89.29 CHRONIC BILATERAL LOW BACK PAIN WITHOUT SCIATICA: Primary | ICD-10-CM

## 2020-12-21 DIAGNOSIS — M54.50 CHRONIC BILATERAL LOW BACK PAIN WITHOUT SCIATICA: Primary | ICD-10-CM

## 2020-12-21 PROCEDURE — 99213 PR OFFICE/OUTPT VISIT, EST, LEVL III, 20-29 MIN: ICD-10-PCS | Mod: S$GLB,,, | Performed by: PHYSICAL MEDICINE & REHABILITATION

## 2020-12-21 PROCEDURE — 99213 OFFICE O/P EST LOW 20 MIN: CPT | Mod: S$GLB,,, | Performed by: PHYSICAL MEDICINE & REHABILITATION

## 2020-12-21 NOTE — PROGRESS NOTES
SUBJECTIVE:    Patient ID: Dayo Cortes is a 50 y.o. female.    Chief Complaint: No chief complaint on file.    She is here for follow-up status post bilateral facet injections at L4-5 and L5-S1 on 11/27/2020 with Dr. Allen.  Much improved following that procedure.  Currently not having any pain.  She is sleeping better.  She did get a new mattress and that helps.  She also tried Advil p.m. and that helps as well with sleep        Past Medical History:   Diagnosis Date    Abnormal Pap smear 2006    once but repeated pap smear and the resulted WNLs.    Abnormal Pap smear of cervix     early 30 's became normal    GERD (gastroesophageal reflux disease)      Social History     Socioeconomic History    Marital status: Single     Spouse name: Not on file    Number of children: Not on file    Years of education: Not on file    Highest education level: Not on file   Occupational History    Not on file   Social Needs    Financial resource strain: Not on file    Food insecurity     Worry: Not on file     Inability: Not on file    Transportation needs     Medical: Not on file     Non-medical: Not on file   Tobacco Use    Smoking status: Never Smoker    Smokeless tobacco: Never Used   Substance and Sexual Activity    Alcohol use: Yes     Frequency: Monthly or less     Drinks per session: Patient refused     Binge frequency: Patient refused     Comment: occasional    Drug use: No    Sexual activity: Yes     Partners: Male     Birth control/protection: Condom   Lifestyle    Physical activity     Days per week: Not on file     Minutes per session: Not on file    Stress: Only a little   Relationships    Social connections     Talks on phone: Not on file     Gets together: Not on file     Attends Restorationist service: Not on file     Active member of club or organization: Not on file     Attends meetings of clubs or organizations: Not on file     Relationship status: Not on file   Other Topics Concern    Not on  file   Social History Narrative    Not on file     Past Surgical History:   Procedure Laterality Date    COLONOSCOPY N/A 6/7/2017    Procedure: COLONOSCOPY;  Surgeon: Win Rodriguez MD;  Location[facility];  Service: Endoscopy;  Laterality: N/A;    HYSTERECTOMY      LUMBAR PARAVERTEBRAL FACET JOINT NERVE BLOCK Bilateral 11/17/2020    Procedure: facet joint injection;  Surgeon: Sina Allen MD;  Location: [facility] OR;  Service: Pain Management;  Laterality: Bilateral;  L4-5, L5-S1     Family History   Problem Relation Age of Onset    Breast cancer Mother 40        unconfirmed.    Diabetes Maternal Grandmother     Thyroid disease Maternal Aunt         nodules and hashimotos    Ovarian cancer Neg Hx     Colon cancer Neg Hx      There were no vitals filed for this visit.    Review of Systems   Constitutional: Negative for chills, diaphoresis, fatigue, fever and unexpected weight change.   HENT: Negative for trouble swallowing.    Eyes: Negative for visual disturbance.   Respiratory: Negative for shortness of breath.    Cardiovascular: Negative for chest pain.   Gastrointestinal: Negative for abdominal pain, constipation, nausea and vomiting.   Genitourinary: Negative for difficulty urinating.   Musculoskeletal: Negative for arthralgias, back pain, gait problem, joint swelling, myalgias, neck pain and neck stiffness.   Neurological: Negative for dizziness, speech difficulty, weakness, light-headedness, numbness and headaches.          Objective:      Physical Exam  Neurological:      Mental Status: She is alert and oriented to person, place, and time.             Assessment:       1. Chronic bilateral low back pain without sciatica           Plan:     improved following facet injections bilaterally L4-5 and L5-S1.  Repeat as needed.  Consider radiofrequency ablation at the same levels.  Follow-up here as needed      Chronic bilateral low back pain without sciatica

## 2021-01-21 DIAGNOSIS — Z12.31 OTHER SCREENING MAMMOGRAM: ICD-10-CM

## 2021-03-11 ENCOUNTER — IMMUNIZATION (OUTPATIENT)
Dept: PRIMARY CARE CLINIC | Facility: CLINIC | Age: 51
End: 2021-03-11
Payer: COMMERCIAL

## 2021-03-11 ENCOUNTER — HOSPITAL ENCOUNTER (OUTPATIENT)
Dept: RADIOLOGY | Facility: CLINIC | Age: 51
Discharge: HOME | End: 2021-03-11
Attending: FAMILY MEDICINE
Payer: COMMERCIAL

## 2021-03-11 DIAGNOSIS — Z23 NEED FOR VACCINATION: Primary | ICD-10-CM

## 2021-03-11 DIAGNOSIS — Z12.31 OTHER SCREENING MAMMOGRAM: ICD-10-CM

## 2021-03-11 PROCEDURE — 77067 SCR MAMMO BI INCL CAD: CPT | Mod: TC,PO

## 2021-03-11 PROCEDURE — 77067 MAMMO DIGITAL SCREENING BILAT WITH TOMO: ICD-10-PCS | Mod: 26,,, | Performed by: RADIOLOGY

## 2021-03-11 PROCEDURE — 91300 COVID-19, MRNA, LNP-S, PF, 30 MCG/0.3 ML DOSE VACCINE: CPT | Mod: S$GLB,,, | Performed by: FAMILY MEDICINE

## 2021-03-11 PROCEDURE — 0001A COVID-19, MRNA, LNP-S, PF, 30 MCG/0.3 ML DOSE VACCINE: CPT | Mod: CV19,S$GLB,, | Performed by: FAMILY MEDICINE

## 2021-03-11 PROCEDURE — 77063 MAMMO DIGITAL SCREENING BILAT WITH TOMO: ICD-10-PCS | Mod: 26,,, | Performed by: RADIOLOGY

## 2021-03-11 PROCEDURE — 91300 COVID-19, MRNA, LNP-S, PF, 30 MCG/0.3 ML DOSE VACCINE: ICD-10-PCS | Mod: S$GLB,,, | Performed by: FAMILY MEDICINE

## 2021-03-11 PROCEDURE — 77063 BREAST TOMOSYNTHESIS BI: CPT | Mod: 26,,, | Performed by: RADIOLOGY

## 2021-03-11 PROCEDURE — 77067 SCR MAMMO BI INCL CAD: CPT | Mod: 26,,, | Performed by: RADIOLOGY

## 2021-03-11 PROCEDURE — 0001A COVID-19, MRNA, LNP-S, PF, 30 MCG/0.3 ML DOSE VACCINE: ICD-10-PCS | Mod: CV19,S$GLB,, | Performed by: FAMILY MEDICINE

## 2021-04-01 ENCOUNTER — IMMUNIZATION (OUTPATIENT)
Dept: PRIMARY CARE CLINIC | Facility: CLINIC | Age: 51
End: 2021-04-01
Payer: COMMERCIAL

## 2021-04-01 DIAGNOSIS — Z23 NEED FOR VACCINATION: Primary | ICD-10-CM

## 2021-04-01 PROCEDURE — 0002A COVID-19, MRNA, LNP-S, PF, 30 MCG/0.3 ML DOSE VACCINE: ICD-10-PCS | Mod: CV19,S$GLB,, | Performed by: FAMILY MEDICINE

## 2021-04-01 PROCEDURE — 91300 COVID-19, MRNA, LNP-S, PF, 30 MCG/0.3 ML DOSE VACCINE: ICD-10-PCS | Mod: S$GLB,,, | Performed by: FAMILY MEDICINE

## 2021-04-01 PROCEDURE — 0002A COVID-19, MRNA, LNP-S, PF, 30 MCG/0.3 ML DOSE VACCINE: CPT | Mod: CV19,S$GLB,, | Performed by: FAMILY MEDICINE

## 2021-04-01 PROCEDURE — 91300 COVID-19, MRNA, LNP-S, PF, 30 MCG/0.3 ML DOSE VACCINE: CPT | Mod: S$GLB,,, | Performed by: FAMILY MEDICINE

## 2021-04-15 ENCOUNTER — PATIENT MESSAGE (OUTPATIENT)
Dept: FAMILY MEDICINE | Facility: CLINIC | Age: 51
End: 2021-04-15

## 2021-04-15 ENCOUNTER — NURSE TRIAGE (OUTPATIENT)
Dept: ADMINISTRATIVE | Facility: CLINIC | Age: 51
End: 2021-04-15

## 2021-04-16 ENCOUNTER — TELEPHONE (OUTPATIENT)
Dept: FAMILY MEDICINE | Facility: CLINIC | Age: 51
End: 2021-04-16

## 2021-05-20 ENCOUNTER — TELEPHONE (OUTPATIENT)
Dept: FAMILY MEDICINE | Facility: CLINIC | Age: 51
End: 2021-05-20

## 2021-05-26 ENCOUNTER — TELEPHONE (OUTPATIENT)
Dept: FAMILY MEDICINE | Facility: CLINIC | Age: 51
End: 2021-05-26

## 2021-05-27 ENCOUNTER — LAB VISIT (OUTPATIENT)
Dept: LAB | Facility: HOSPITAL | Age: 51
End: 2021-05-27
Attending: FAMILY MEDICINE
Payer: COMMERCIAL

## 2021-05-27 DIAGNOSIS — Z00.00 HEALTHCARE MAINTENANCE: ICD-10-CM

## 2021-05-27 DIAGNOSIS — L25.9 CONTACT DERMATITIS, UNSPECIFIED CONTACT DERMATITIS TYPE, UNSPECIFIED TRIGGER: ICD-10-CM

## 2021-05-27 PROCEDURE — 83036 HEMOGLOBIN GLYCOSYLATED A1C: CPT | Performed by: FAMILY MEDICINE

## 2021-05-27 PROCEDURE — 80061 LIPID PANEL: CPT | Performed by: FAMILY MEDICINE

## 2021-05-27 PROCEDURE — 36415 COLL VENOUS BLD VENIPUNCTURE: CPT | Mod: PO | Performed by: FAMILY MEDICINE

## 2021-05-27 PROCEDURE — 80053 COMPREHEN METABOLIC PANEL: CPT | Performed by: FAMILY MEDICINE

## 2021-05-28 LAB
ALBUMIN SERPL BCP-MCNC: 3.8 G/DL (ref 3.5–5.2)
ALP SERPL-CCNC: 70 U/L (ref 55–135)
ALT SERPL W/O P-5'-P-CCNC: 19 U/L (ref 10–44)
ANION GAP SERPL CALC-SCNC: 11 MMOL/L (ref 8–16)
AST SERPL-CCNC: 21 U/L (ref 10–40)
BILIRUB SERPL-MCNC: 0.3 MG/DL (ref 0.1–1)
BUN SERPL-MCNC: 22 MG/DL (ref 6–20)
CALCIUM SERPL-MCNC: 9.2 MG/DL (ref 8.7–10.5)
CHLORIDE SERPL-SCNC: 109 MMOL/L (ref 95–110)
CHOLEST SERPL-MCNC: 220 MG/DL (ref 120–199)
CHOLEST/HDLC SERPL: 2.7 {RATIO} (ref 2–5)
CO2 SERPL-SCNC: 22 MMOL/L (ref 23–29)
CREAT SERPL-MCNC: 0.9 MG/DL (ref 0.5–1.4)
EST. GFR  (AFRICAN AMERICAN): >60 ML/MIN/1.73 M^2
EST. GFR  (NON AFRICAN AMERICAN): >60 ML/MIN/1.73 M^2
ESTIMATED AVG GLUCOSE: 103 MG/DL (ref 68–131)
GLUCOSE SERPL-MCNC: 87 MG/DL (ref 70–110)
HBA1C MFR BLD: 5.2 % (ref 4–5.6)
HDLC SERPL-MCNC: 83 MG/DL (ref 40–75)
HDLC SERPL: 37.7 % (ref 20–50)
LDLC SERPL CALC-MCNC: 129 MG/DL (ref 63–159)
NONHDLC SERPL-MCNC: 137 MG/DL
POTASSIUM SERPL-SCNC: 4.3 MMOL/L (ref 3.5–5.1)
PROT SERPL-MCNC: 7 G/DL (ref 6–8.4)
SODIUM SERPL-SCNC: 142 MMOL/L (ref 136–145)
TRIGL SERPL-MCNC: 40 MG/DL (ref 30–150)

## 2021-06-16 ENCOUNTER — OFFICE VISIT (OUTPATIENT)
Dept: FAMILY MEDICINE | Facility: CLINIC | Age: 51
End: 2021-06-16
Payer: COMMERCIAL

## 2021-06-16 VITALS
OXYGEN SATURATION: 98 % | TEMPERATURE: 98 F | DIASTOLIC BLOOD PRESSURE: 78 MMHG | SYSTOLIC BLOOD PRESSURE: 120 MMHG | WEIGHT: 163.81 LBS | HEIGHT: 66 IN | HEART RATE: 74 BPM | BODY MASS INDEX: 26.33 KG/M2 | RESPIRATION RATE: 12 BRPM

## 2021-06-16 DIAGNOSIS — K64.8 INTERNAL HEMORRHOIDS: ICD-10-CM

## 2021-06-16 DIAGNOSIS — M79.672 LEFT FOOT PAIN: Primary | ICD-10-CM

## 2021-06-16 PROCEDURE — 99999 PR PBB SHADOW E&M-EST. PATIENT-LVL IV: ICD-10-PCS | Mod: PBBFAC,,, | Performed by: FAMILY MEDICINE

## 2021-06-16 PROCEDURE — 99999 PR PBB SHADOW E&M-EST. PATIENT-LVL IV: CPT | Mod: PBBFAC,,, | Performed by: FAMILY MEDICINE

## 2021-06-16 PROCEDURE — 3008F PR BODY MASS INDEX (BMI) DOCUMENTED: ICD-10-PCS | Mod: CPTII,S$GLB,, | Performed by: FAMILY MEDICINE

## 2021-06-16 PROCEDURE — 1126F AMNT PAIN NOTED NONE PRSNT: CPT | Mod: S$GLB,,, | Performed by: FAMILY MEDICINE

## 2021-06-16 PROCEDURE — 99396 PREV VISIT EST AGE 40-64: CPT | Mod: S$GLB,,, | Performed by: FAMILY MEDICINE

## 2021-06-16 PROCEDURE — 99396 PR PREVENTIVE VISIT,EST,40-64: ICD-10-PCS | Mod: S$GLB,,, | Performed by: FAMILY MEDICINE

## 2021-06-16 PROCEDURE — 1126F PR PAIN SEVERITY QUANTIFIED, NO PAIN PRESENT: ICD-10-PCS | Mod: S$GLB,,, | Performed by: FAMILY MEDICINE

## 2021-06-16 PROCEDURE — 3008F BODY MASS INDEX DOCD: CPT | Mod: CPTII,S$GLB,, | Performed by: FAMILY MEDICINE

## 2021-06-24 ENCOUNTER — HOSPITAL ENCOUNTER (OUTPATIENT)
Dept: RADIOLOGY | Facility: CLINIC | Age: 51
Discharge: HOME | End: 2021-06-24
Attending: PODIATRIST
Payer: COMMERCIAL

## 2021-06-24 ENCOUNTER — OFFICE VISIT (OUTPATIENT)
Dept: PODIATRY | Facility: CLINIC | Age: 51
End: 2021-06-24
Payer: COMMERCIAL

## 2021-06-24 VITALS
HEART RATE: 75 BPM | SYSTOLIC BLOOD PRESSURE: 108 MMHG | DIASTOLIC BLOOD PRESSURE: 73 MMHG | HEIGHT: 66 IN | WEIGHT: 163 LBS | BODY MASS INDEX: 26.2 KG/M2

## 2021-06-24 DIAGNOSIS — G57.92 NERVE ENTRAPMENT SYNDROME OF LEFT FOOT: ICD-10-CM

## 2021-06-24 DIAGNOSIS — M79.672 LEFT FOOT PAIN: Primary | ICD-10-CM

## 2021-06-24 DIAGNOSIS — M20.12 HALLUX VALGUS, LEFT: ICD-10-CM

## 2021-06-24 PROCEDURE — 3008F BODY MASS INDEX DOCD: CPT | Mod: CPTII,S$GLB,, | Performed by: PODIATRIST

## 2021-06-24 PROCEDURE — 73630 X-RAY EXAM OF FOOT: CPT | Mod: LT,S$GLB,, | Performed by: RADIOLOGY

## 2021-06-24 PROCEDURE — 3008F PR BODY MASS INDEX (BMI) DOCUMENTED: ICD-10-PCS | Mod: CPTII,S$GLB,, | Performed by: PODIATRIST

## 2021-06-24 PROCEDURE — 99204 PR OFFICE/OUTPT VISIT, NEW, LEVL IV, 45-59 MIN: ICD-10-PCS | Mod: S$GLB,,, | Performed by: PODIATRIST

## 2021-06-24 PROCEDURE — 73630 XR FOOT COMPLETE 3 VIEW LEFT: ICD-10-PCS | Mod: LT,S$GLB,, | Performed by: RADIOLOGY

## 2021-06-24 PROCEDURE — 99204 OFFICE O/P NEW MOD 45 MIN: CPT | Mod: S$GLB,,, | Performed by: PODIATRIST

## 2021-06-24 PROCEDURE — 1125F AMNT PAIN NOTED PAIN PRSNT: CPT | Mod: S$GLB,,, | Performed by: PODIATRIST

## 2021-06-24 PROCEDURE — 1125F PR PAIN SEVERITY QUANTIFIED, PAIN PRESENT: ICD-10-PCS | Mod: S$GLB,,, | Performed by: PODIATRIST

## 2021-07-12 ENCOUNTER — TELEPHONE (OUTPATIENT)
Dept: FAMILY MEDICINE | Facility: CLINIC | Age: 51
End: 2021-07-12

## 2021-07-12 DIAGNOSIS — R20.2 TINGLING: Primary | ICD-10-CM

## 2021-08-15 ENCOUNTER — PATIENT OUTREACH (OUTPATIENT)
Dept: ADMINISTRATIVE | Facility: OTHER | Age: 51
End: 2021-08-15

## 2021-12-01 DIAGNOSIS — K21.9 GASTROESOPHAGEAL REFLUX DISEASE: ICD-10-CM

## 2021-12-01 RX ORDER — PANTOPRAZOLE SODIUM 20 MG/1
20 TABLET, DELAYED RELEASE ORAL DAILY
Qty: 90 TABLET | Refills: 3 | Status: SHIPPED | OUTPATIENT
Start: 2021-12-01 | End: 2022-09-13

## 2022-01-07 RX ORDER — ACYCLOVIR 400 MG/1
400 TABLET ORAL 2 TIMES DAILY
Qty: 180 TABLET | Refills: 3 | Status: SHIPPED | OUTPATIENT
Start: 2022-01-07 | End: 2023-03-28

## 2022-01-07 NOTE — TELEPHONE ENCOUNTER
----- Message from Alfredo Zapien sent at 1/7/2022 10:50 AM CST -----  Type: Needs Medical Advice  Who Called:  Patient    Pharmacy name and phone #:    [id number] - [place] - [address] AT [facility]  [address]  [place] [address]  Phone: [phone] Fax: [phone]      Best Call Back Number: [phone]  Additional Information Patient states that her pharmacy has sent several refill requests with no response:  acyclovir (ZOVIRAX) 400 MG tablet    Please call to advise

## 2022-01-07 NOTE — TELEPHONE ENCOUNTER
No new care gaps identified.  Powered by [employer] Reference number: [id number].   1/07/2022 12:22:25 PM CST

## 2022-01-10 ENCOUNTER — IMMUNIZATION (OUTPATIENT)
Dept: PRIMARY CARE CLINIC | Facility: CLINIC | Age: 52
End: 2022-01-10
Payer: COMMERCIAL

## 2022-01-10 DIAGNOSIS — Z23 NEED FOR VACCINATION: Primary | ICD-10-CM

## 2022-01-10 PROCEDURE — 0004A COVID-19, MRNA, LNP-S, PF, 30 MCG/0.3 ML DOSE VACCINE: CPT | Mod: S$GLB,,, | Performed by: FAMILY MEDICINE

## 2022-01-10 PROCEDURE — 0004A COVID-19, MRNA, LNP-S, PF, 30 MCG/0.3 ML DOSE VACCINE: ICD-10-PCS | Mod: S$GLB,,, | Performed by: FAMILY MEDICINE

## 2022-01-10 PROCEDURE — 91300 COVID-19, MRNA, LNP-S, PF, 30 MCG/0.3 ML DOSE VACCINE: ICD-10-PCS | Mod: S$GLB,,, | Performed by: FAMILY MEDICINE

## 2022-01-10 PROCEDURE — 91300 COVID-19, MRNA, LNP-S, PF, 30 MCG/0.3 ML DOSE VACCINE: CPT | Mod: S$GLB,,, | Performed by: FAMILY MEDICINE

## 2022-02-08 ENCOUNTER — PATIENT OUTREACH (OUTPATIENT)
Dept: ADMINISTRATIVE | Facility: OTHER | Age: 52
End: 2022-02-08
Payer: COMMERCIAL

## 2022-02-08 DIAGNOSIS — M79.641 RIGHT HAND PAIN: Primary | ICD-10-CM

## 2022-02-08 DIAGNOSIS — M79.641 PAIN OF RIGHT HAND: Primary | ICD-10-CM

## 2022-02-09 ENCOUNTER — HOSPITAL ENCOUNTER (OUTPATIENT)
Dept: RADIOLOGY | Facility: HOSPITAL | Age: 52
Discharge: HOME | End: 2022-02-09
Attending: PHYSICIAN ASSISTANT
Payer: COMMERCIAL

## 2022-02-09 ENCOUNTER — OFFICE VISIT (OUTPATIENT)
Dept: ORTHOPEDICS | Facility: CLINIC | Age: 52
End: 2022-02-09
Payer: COMMERCIAL

## 2022-02-09 VITALS — HEIGHT: 66 IN | BODY MASS INDEX: 26.19 KG/M2 | WEIGHT: 162.94 LBS

## 2022-02-09 DIAGNOSIS — M79.641 RIGHT HAND PAIN: ICD-10-CM

## 2022-02-09 DIAGNOSIS — M65.331 TRIGGER FINGER, RIGHT MIDDLE FINGER: Primary | ICD-10-CM

## 2022-02-09 DIAGNOSIS — M79.644 FINGER PAIN, RIGHT: ICD-10-CM

## 2022-02-09 PROCEDURE — 1159F PR MEDICATION LIST DOCUMENTED IN MEDICAL RECORD: ICD-10-PCS | Mod: CPTII,S$GLB,, | Performed by: PHYSICIAN ASSISTANT

## 2022-02-09 PROCEDURE — 99203 OFFICE O/P NEW LOW 30 MIN: CPT | Mod: S$GLB,,, | Performed by: PHYSICIAN ASSISTANT

## 2022-02-09 PROCEDURE — 99999 PR PBB SHADOW E&M-EST. PATIENT-LVL III: ICD-10-PCS | Mod: PBBFAC,,, | Performed by: PHYSICIAN ASSISTANT

## 2022-02-09 PROCEDURE — 99999 PR PBB SHADOW E&M-EST. PATIENT-LVL III: CPT | Mod: PBBFAC,,, | Performed by: PHYSICIAN ASSISTANT

## 2022-02-09 PROCEDURE — 1159F MED LIST DOCD IN RCRD: CPT | Mod: CPTII,S$GLB,, | Performed by: PHYSICIAN ASSISTANT

## 2022-02-09 PROCEDURE — 73130 XR HAND COMPLETE 3 VIEW RIGHT: ICD-10-PCS | Mod: 26,RT,, | Performed by: RADIOLOGY

## 2022-02-09 PROCEDURE — 3008F BODY MASS INDEX DOCD: CPT | Mod: CPTII,S$GLB,, | Performed by: PHYSICIAN ASSISTANT

## 2022-02-09 PROCEDURE — 73130 X-RAY EXAM OF HAND: CPT | Mod: TC,PN,RT

## 2022-02-09 PROCEDURE — 99203 PR OFFICE/OUTPT VISIT, NEW, LEVL III, 30-44 MIN: ICD-10-PCS | Mod: S$GLB,,, | Performed by: PHYSICIAN ASSISTANT

## 2022-02-09 PROCEDURE — 73130 X-RAY EXAM OF HAND: CPT | Mod: 26,RT,, | Performed by: RADIOLOGY

## 2022-02-09 PROCEDURE — 3008F PR BODY MASS INDEX (BMI) DOCUMENTED: ICD-10-PCS | Mod: CPTII,S$GLB,, | Performed by: PHYSICIAN ASSISTANT

## 2022-02-09 NOTE — PROCEDURES
Tendon Sheath/RIGHT long trigger finger (1st injection)    Date/Time: 2/9/2022 8:00 AM  Performed by: Jamie L. Russo-Digeorge, PA-C  Authorized by: Jamie L. Russo-Digeorge, PA-C     Consent Done?:  Yes (Verbal)  Indications:  Pain  Site marked: the procedure site was marked    Timeout: prior to procedure the correct patient, procedure, and site was verified    Prep: patient was prepped and draped in usual sterile fashion      Local anesthesia used?: Yes    Local anesthetic:  Lidocaine 1% without epinephrine (Topical spray prior to injection and 1cc 1% plain lidocaine in the injectate)  Location:  Long finger  Site:  R long flexor tendon sheath  Ultrasonic guidance for needle placement?: No    Needle size:  25 G  Approach:  Volar  Medications:  4 mg dexamethasone 4 mg/mL  Patient tolerance:  Patient tolerated the procedure well with no immediate complications

## 2022-02-09 NOTE — PROGRESS NOTES
[facility]  History & Physical  Orthopedics    SUBJECTIVE:      COVID-19 attestation:  This patient was treated during the COVID-19 pandemic.  This was discussed with the patient, they are aware of our current policies and procedures, were given the option of delaying their visit and or switching to a virtual visit, delaying their surgery when applicable, and they elect to proceed.    Chief Complaint: Right middle finger pain    Referring Provider: Stoney Stack MD     History of Present Illness:  Patient is a 51 y.o. right hand dominant female who presents today with complaints of right middle finger pain, present for about 6 months. She denies any injury or overuse. Notes pain mostly at the PIP, described as an ache and unable to make a full strong fist. She does report some swelling in the finger occasionally, and rarely will get some tingling sensation in the finger that comes/goes. She denies any locking.     Onset of symptoms/DOI was 6 months.    Symptoms are aggravated by activity and movement.    Symptoms are alleviated by rest.    Symptoms consist of pain, swelling and decreased ROM.    The patient rates their pain as a 4/10.    Attempted treatment(s) and/or interventions include activity modifications, rest, anti-inflammatory medications.     The patient denies any fevers, chills, N/V, D/C and presents for evaluation.       Past Medical History:   Diagnosis Date    Abnormal Pap smear 2006    once but repeated pap smear and the resulted WNLs.    Abnormal Pap smear of cervix     early 30 's became normal    GERD (gastroesophageal reflux disease)      Past Surgical History:   Procedure Laterality Date    COLONOSCOPY N/A 6/7/2017    Procedure: COLONOSCOPY;  Surgeon: Win Rodriguez MD;  Location: [facility];  Service: Endoscopy;  Laterality: N/A;    HYSTERECTOMY      LUMBAR PARAVERTEBRAL FACET JOINT NERVE BLOCK Bilateral 11/17/2020    Procedure: facet joint injection;   "Surgeon: Sina Allen MD;  Location: [facility] OR;  Service: Pain Management;  Laterality: Bilateral;  L4-5, L5-S1     Review of patient's allergies indicates:  No Known Allergies  Social History     Social History Narrative    Not on file     Family History   Problem Relation Age of Onset    Breast cancer Mother 40        unconfirmed.    Diabetes Maternal Grandmother     Thyroid disease Maternal Aunt         nodules and hashimotos    Ovarian cancer Neg Hx     Colon cancer Neg Hx          Current Outpatient Medications:     acyclovir (ZOVIRAX) 400 MG tablet, Take 1 tablet (400 mg total) by mouth 2 (two) times daily., Disp: 180 tablet, Rfl: 3    estradioL (ESTRACE) 1 MG tablet, TK 1 T PO QD, Disp: , Rfl:     multivitamin capsule, Take 1 capsule by mouth once daily., Disp: , Rfl:     pantoprazole (PROTONIX) 20 MG tablet, Take 1 tablet (20 mg total) by mouth once daily., Disp: 90 tablet, Rfl: 3      Review of Systems:  Constitutional: no fever or chills  Eyes: no visual changes  ENT: no nasal congestion or sore throat  Respiratory: no cough or shortness of breath  Cardiovascular: no chest pain  Gastrointestinal: no nausea or vomiting, tolerating diet  Musculoskeletal: pain, soreness and decreased ROM    OBJECTIVE:      Vital Signs (Most Recent):  Vitals:    02/09/22 0815   Weight: 73.9 kg (162 lb 14.7 oz)   Height: 5' 6" (1.676 m)     Body mass index is 26.3 kg/m².      Physical Exam:  Constitutional: The patient appears well-developed and well-nourished. No distress.   Skin: No lesions appreciated  Head: Normocephalic and atraumatic.   Nose: Nose normal.   Ears: No deformities seen  Eyes: Conjunctivae and EOM are normal.   Neck: No tracheal deviation present.   Cardiovascular: Normal rate and intact distal pulses.    Pulmonary/Chest: Effort normal. No respiratory distress.   Abdominal: There is no guarding.   Neurological: The patient is alert.   Psychiatric: The patient has a normal mood and affect.     Right " Hand/Wrist Examination:    Observation/Inspection:  Swelling  none    Deformity  none  Discoloration  none     Scars   none    Atrophy  none    HAND/WRIST EXAMINATION:  Finkelstein's Test   Neg  WHAT Test    Neg  Snuff box tenderness   Neg  Mast's Test    Neg  Hook of Hamate Tenderness  Neg  CMC grind    Neg  Circumduction test   Neg  Mild TTP to the middle finger A1 pulley    Neurovascular Exam:  Digits WWP, brisk CR < 3s throughout  NVI motor/LTS to M/R/U nerves, radial pulse 2+  Tinel's Test - Carpal Tunnel  Neg  Tinel's Test - Cubital Tunnel  Neg  Phalen's Test    Neg  Median Nerve Compression Test Pos, mild    ROM hand full, painless    ROM wrist full, painless    ROM elbow full, painless    Abdomen not guarded  Respirations nonlabored  Perfusion intact    Diagnostic Results:     Imaging - I independently viewed the patient's imaging as well as the radiology report.  Xrays of the patient's right hand demonstrate no evidence of any acute fractures or dislocations or significant degenerative changes.    EMG - none    ASSESSMENT/PLAN:      51 y.o. yo female with Right long trigger finger    Plan: The patient and I had a thorough discussion today.  We discussed the working diagnosis as well as several other potential alternative diagnoses.  Treatment options were discussed, both conservative and surgical.  Conservative treatment options would include things such as activity modifications, workplace modifications, a period of rest, oral vs topical OTC and prescription anti-inflammatory medications, occupational therapy, splinting/bracing, immobilization, corticosteroid injections, and others.  Surgical options were discussed as well.     At this time, the patient would like to proceed with trigger finger splint at night, voltaren gel massage. We discuss CSI can be done at any time. RTC on prn basis.    Should the patient's symptoms worsen, persist, or fail to improve they should return for reevaluation and I would  be happy to see them back anytime.           Please do not hesitate to reach out to us via email, phone, or MyChart with any questions, concerns, or feedback.

## 2022-03-24 DIAGNOSIS — Z12.31 OTHER SCREENING MAMMOGRAM: ICD-10-CM

## 2022-03-28 ENCOUNTER — PATIENT MESSAGE (OUTPATIENT)
Dept: ADMINISTRATIVE | Facility: HOSPITAL | Age: 52
End: 2022-03-28
Payer: COMMERCIAL

## 2022-05-25 ENCOUNTER — HOSPITAL ENCOUNTER (OUTPATIENT)
Dept: RADIOLOGY | Facility: CLINIC | Age: 52
Discharge: HOME | End: 2022-05-25
Attending: NURSE PRACTITIONER
Payer: COMMERCIAL

## 2022-05-25 ENCOUNTER — OFFICE VISIT (OUTPATIENT)
Dept: FAMILY MEDICINE | Facility: CLINIC | Age: 52
End: 2022-05-25
Payer: COMMERCIAL

## 2022-05-25 VITALS
BODY MASS INDEX: 24.87 KG/M2 | OXYGEN SATURATION: 98 % | HEIGHT: 66 IN | DIASTOLIC BLOOD PRESSURE: 78 MMHG | TEMPERATURE: 99 F | SYSTOLIC BLOOD PRESSURE: 120 MMHG | WEIGHT: 154.75 LBS | HEART RATE: 73 BPM

## 2022-05-25 DIAGNOSIS — M25.469 SWELLING OF KNEE: Primary | ICD-10-CM

## 2022-05-25 DIAGNOSIS — M25.469 SWELLING OF KNEE: ICD-10-CM

## 2022-05-25 DIAGNOSIS — M54.50 RIGHT-SIDED LOW BACK PAIN WITHOUT SCIATICA, UNSPECIFIED CHRONICITY: ICD-10-CM

## 2022-05-25 PROCEDURE — 73560 X-RAY EXAM OF KNEE 1 OR 2: CPT | Mod: 26,RT,S$GLB, | Performed by: RADIOLOGY

## 2022-05-25 PROCEDURE — 99213 PR OFFICE/OUTPT VISIT, EST, LEVL III, 20-29 MIN: ICD-10-PCS | Mod: S$GLB,,, | Performed by: NURSE PRACTITIONER

## 2022-05-25 PROCEDURE — 73560 XR KNEE 1 OR 2 VIEW RIGHT: ICD-10-PCS | Mod: 26,RT,S$GLB, | Performed by: RADIOLOGY

## 2022-05-25 PROCEDURE — 3008F BODY MASS INDEX DOCD: CPT | Mod: CPTII,S$GLB,, | Performed by: NURSE PRACTITIONER

## 2022-05-25 PROCEDURE — 1159F PR MEDICATION LIST DOCUMENTED IN MEDICAL RECORD: ICD-10-PCS | Mod: CPTII,S$GLB,, | Performed by: NURSE PRACTITIONER

## 2022-05-25 PROCEDURE — 3074F PR MOST RECENT SYSTOLIC BLOOD PRESSURE < 130 MM HG: ICD-10-PCS | Mod: CPTII,S$GLB,, | Performed by: NURSE PRACTITIONER

## 2022-05-25 PROCEDURE — 3078F DIAST BP <80 MM HG: CPT | Mod: CPTII,S$GLB,, | Performed by: NURSE PRACTITIONER

## 2022-05-25 PROCEDURE — 99213 OFFICE O/P EST LOW 20 MIN: CPT | Mod: S$GLB,,, | Performed by: NURSE PRACTITIONER

## 2022-05-25 PROCEDURE — 73560 XR KNEE 1 OR 2 VIEW LEFT: ICD-10-PCS | Mod: 26,LT,S$GLB, | Performed by: RADIOLOGY

## 2022-05-25 PROCEDURE — 3008F PR BODY MASS INDEX (BMI) DOCUMENTED: ICD-10-PCS | Mod: CPTII,S$GLB,, | Performed by: NURSE PRACTITIONER

## 2022-05-25 PROCEDURE — 3074F SYST BP LT 130 MM HG: CPT | Mod: CPTII,S$GLB,, | Performed by: NURSE PRACTITIONER

## 2022-05-25 PROCEDURE — 99999 PR PBB SHADOW E&M-EST. PATIENT-LVL V: CPT | Mod: PBBFAC,,, | Performed by: NURSE PRACTITIONER

## 2022-05-25 PROCEDURE — 1160F RVW MEDS BY RX/DR IN RCRD: CPT | Mod: CPTII,S$GLB,, | Performed by: NURSE PRACTITIONER

## 2022-05-25 PROCEDURE — 1160F PR REVIEW ALL MEDS BY PRESCRIBER/CLIN PHARMACIST DOCUMENTED: ICD-10-PCS | Mod: CPTII,S$GLB,, | Performed by: NURSE PRACTITIONER

## 2022-05-25 PROCEDURE — 73560 X-RAY EXAM OF KNEE 1 OR 2: CPT | Mod: TC,FY,PO,RT

## 2022-05-25 PROCEDURE — 1159F MED LIST DOCD IN RCRD: CPT | Mod: CPTII,S$GLB,, | Performed by: NURSE PRACTITIONER

## 2022-05-25 PROCEDURE — 3078F PR MOST RECENT DIASTOLIC BLOOD PRESSURE < 80 MM HG: ICD-10-PCS | Mod: CPTII,S$GLB,, | Performed by: NURSE PRACTITIONER

## 2022-05-25 PROCEDURE — 99999 PR PBB SHADOW E&M-EST. PATIENT-LVL V: ICD-10-PCS | Mod: PBBFAC,,, | Performed by: NURSE PRACTITIONER

## 2022-05-25 PROCEDURE — 73560 X-RAY EXAM OF KNEE 1 OR 2: CPT | Mod: TC,FY,PO,LT

## 2022-05-25 PROCEDURE — 73560 X-RAY EXAM OF KNEE 1 OR 2: CPT | Mod: 26,LT,S$GLB, | Performed by: RADIOLOGY

## 2022-05-25 NOTE — PROGRESS NOTES
Subjective:       Patient ID: Dayo Cortes is a 51 y.o. female.    Chief Complaint: fluid on knee    HPI   52 y/o female patient presents in clinic for swelling on b/l anterior knees. Denies traumatic event to the affected area.   States felt fluid on right anterior knee 2 weeks ago and fluid on left anterior knee today  Patient is working out at gym and did not do strenuous exercise.   Noticed fluid in right knee 2 weeks ago and fluid on left knee today. Denies pain, fever, chills, generalized body ache.     Patient Active Problem List   Diagnosis    Genital HSV    Abnormal uterine bleeding    Right-sided low back pain without sciatica    GERD (gastroesophageal reflux disease)    Hx of colonic polyps    Thyroid nodule    Other spondylosis, lumbar region        Review of patient's allergies indicates:  No Known Allergies    Past Surgical History:   Procedure Laterality Date    COLONOSCOPY N/A 6/7/2017    Procedure: COLONOSCOPY;  Surgeon: Win Rodriguez MD;  Location: [facility];  Service: Endoscopy;  Laterality: N/A;    HYSTERECTOMY      LUMBAR PARAVERTEBRAL FACET JOINT NERVE BLOCK Bilateral 11/17/2020    Procedure: facet joint injection;  Surgeon: Sina Allen MD;  Location: [facility] OR;  Service: Pain Management;  Laterality: Bilateral;  L4-5, L5-S1        Current Outpatient Medications:     acyclovir (ZOVIRAX) 400 MG tablet, Take 1 tablet (400 mg total) by mouth 2 (two) times daily., Disp: 180 tablet, Rfl: 3    estradioL (ESTRACE) 1 MG tablet, TK 1 T PO QD, Disp: , Rfl:     multivitamin capsule, Take 1 capsule by mouth once daily., Disp: , Rfl:     pantoprazole (PROTONIX) 20 MG tablet, Take 1 tablet (20 mg total) by mouth once daily., Disp: 90 tablet, Rfl: 3    Lab Results   Component Value Date    WBC 6.46 08/22/2020    HGB 12.4 08/22/2020    HCT 40.4 08/22/2020     08/22/2020    CHOL 220 (H) 05/27/2021    TRIG 40 05/27/2021    HDL 83 (H) 05/27/2021    ALT 19 05/27/2021    AST 21 05/27/2021  "    05/27/2021    K 4.3 05/27/2021     05/27/2021    CREATININE 0.9 05/27/2021    BUN 22 (H) 05/27/2021    CO2 22 (L) 05/27/2021    TSH 0.638 08/22/2020    HGBA1C 5.2 05/27/2021         Review of Systems   Constitutional: Negative for appetite change, chills, diaphoresis, fever and unexpected weight change.   Respiratory: Negative for cough, chest tightness and shortness of breath.    Cardiovascular: Negative for chest pain and palpitations.   Gastrointestinal: Negative for abdominal pain.   Musculoskeletal: Positive for joint swelling. Negative for arthralgias.   Neurological: Negative for dizziness and headaches.       Objective:   /78 (BP Location: Right arm, Patient Position: Sitting, BP Method: Small (Manual))   Pulse 73   Temp 98.6 °F (37 °C)   Ht 5' 6" (1.676 m)   Wt 70.2 kg (154 lb 12.2 oz)   LMP 03/12/2019 (Exact Date)   SpO2 98%   BMI 24.98 kg/m²       Physical Exam  Constitutional:       General: She is not in acute distress.     Appearance: Normal appearance.   HENT:      Head: Atraumatic.      Mouth/Throat:      Mouth: Mucous membranes are moist.   Cardiovascular:      Rate and Rhythm: Normal rate and regular rhythm.      Pulses: Normal pulses.      Heart sounds: Normal heart sounds.   Pulmonary:      Effort: Pulmonary effort is normal.      Breath sounds: Normal breath sounds.   Abdominal:      General: Abdomen is flat. Bowel sounds are normal.      Palpations: Abdomen is soft.   Musculoskeletal:         General: Swelling present. No tenderness, deformity or signs of injury.      Right lower leg: No edema.      Left lower leg: No edema.   Skin:     General: Skin is warm and dry.   Neurological:      General: No focal deficit present.      Mental Status: She is alert and oriented to person, place, and time.      Gait: Gait normal.   Psychiatric:         Mood and Affect: Mood normal.         Assessment:       1. Swelling of knee    2. Right-sided low back pain without sciatica, " unspecified chronicity        Plan:   1. Swelling of knee likely bursitis  - Discussed regarding RICE  - NSAIDs as needed  - Ambulatory referral/consult to Orthopedics; Future  - X-Ray Knee 1 or 2 View Left; Future  - X-Ray Knee 1 or 2 View Right; Future    2. Right-sided low back pain without sciatica, unspecified chronicity  - NSAIDs as needed (patient takes Aleve)    Deja NP

## 2022-05-31 ENCOUNTER — PATIENT MESSAGE (OUTPATIENT)
Dept: ADMINISTRATIVE | Facility: HOSPITAL | Age: 52
End: 2022-05-31
Payer: COMMERCIAL

## 2022-06-21 ENCOUNTER — OFFICE VISIT (OUTPATIENT)
Dept: ORTHOPEDICS | Facility: CLINIC | Age: 52
End: 2022-06-21
Payer: COMMERCIAL

## 2022-06-21 VITALS — BODY MASS INDEX: 24.75 KG/M2 | RESPIRATION RATE: 18 BRPM | WEIGHT: 154 LBS | HEIGHT: 66 IN

## 2022-06-21 DIAGNOSIS — M70.41 PREPATELLAR BURSITIS OF BOTH KNEES: ICD-10-CM

## 2022-06-21 DIAGNOSIS — M70.42 PREPATELLAR BURSITIS OF BOTH KNEES: ICD-10-CM

## 2022-06-21 PROCEDURE — 1159F PR MEDICATION LIST DOCUMENTED IN MEDICAL RECORD: ICD-10-PCS | Mod: CPTII,S$GLB,, | Performed by: ORTHOPAEDIC SURGERY

## 2022-06-21 PROCEDURE — 3008F PR BODY MASS INDEX (BMI) DOCUMENTED: ICD-10-PCS | Mod: CPTII,S$GLB,, | Performed by: ORTHOPAEDIC SURGERY

## 2022-06-21 PROCEDURE — 99999 PR PBB SHADOW E&M-EST. PATIENT-LVL III: ICD-10-PCS | Mod: PBBFAC,,, | Performed by: ORTHOPAEDIC SURGERY

## 2022-06-21 PROCEDURE — 99213 PR OFFICE/OUTPT VISIT, EST, LEVL III, 20-29 MIN: ICD-10-PCS | Mod: S$GLB,,, | Performed by: ORTHOPAEDIC SURGERY

## 2022-06-21 PROCEDURE — 3008F BODY MASS INDEX DOCD: CPT | Mod: CPTII,S$GLB,, | Performed by: ORTHOPAEDIC SURGERY

## 2022-06-21 PROCEDURE — 99999 PR PBB SHADOW E&M-EST. PATIENT-LVL III: CPT | Mod: PBBFAC,,, | Performed by: ORTHOPAEDIC SURGERY

## 2022-06-21 PROCEDURE — 1160F PR REVIEW ALL MEDS BY PRESCRIBER/CLIN PHARMACIST DOCUMENTED: ICD-10-PCS | Mod: CPTII,S$GLB,, | Performed by: ORTHOPAEDIC SURGERY

## 2022-06-21 PROCEDURE — 1160F RVW MEDS BY RX/DR IN RCRD: CPT | Mod: CPTII,S$GLB,, | Performed by: ORTHOPAEDIC SURGERY

## 2022-06-21 PROCEDURE — 99213 OFFICE O/P EST LOW 20 MIN: CPT | Mod: S$GLB,,, | Performed by: ORTHOPAEDIC SURGERY

## 2022-06-21 PROCEDURE — 1159F MED LIST DOCD IN RCRD: CPT | Mod: CPTII,S$GLB,, | Performed by: ORTHOPAEDIC SURGERY

## 2022-06-21 NOTE — PROGRESS NOTES
CC:  51-year-old female presents for evaluation of bilateral knee swelling.  The patient states that she works out a lot and when she is down her knees at the gym and that she goes to get up she feels like there is something in the front of her knee that she did notice before.  He is not really painful but she does notice that it is the air and sometimes the area in front of her knee will swell.    Past Medical History:   Diagnosis Date    Abnormal Pap smear 2006    once but repeated pap smear and the resulted WNLs.    Abnormal Pap smear of cervix     early 30 's became normal    GERD (gastroesophageal reflux disease)        Past Surgical History:   Procedure Laterality Date    COLONOSCOPY N/A 6/7/2017    Procedure: COLONOSCOPY;  Surgeon: Win Rodriguez MD;  Location: [facility];  Service: Endoscopy;  Laterality: N/A;    HYSTERECTOMY      LUMBAR PARAVERTEBRAL FACET JOINT NERVE BLOCK Bilateral 11/17/2020    Procedure: facet joint injection;  Surgeon: Sina Allen MD;  Location: [facility] OR;  Service: Pain Management;  Laterality: Bilateral;  L4-5, L5-S1       Current Outpatient Medications on File Prior to Visit   Medication Sig Dispense Refill    acyclovir (ZOVIRAX) 400 MG tablet Take 1 tablet (400 mg total) by mouth 2 (two) times daily. 180 tablet 3    estradioL (ESTRACE) 1 MG tablet TK 1 T PO QD      multivitamin capsule Take 1 capsule by mouth once daily.      pantoprazole (PROTONIX) 20 MG tablet Take 1 tablet (20 mg total) by mouth once daily. 90 tablet 3     No current facility-administered medications on file prior to visit.       ROS:    Constitution: Denies chills, fever, and sweats.  HENT: Denies headaches or blurry vision.  Cardiovascular: Denies chest pain or irregular heart beat.  Respiratory: Denies cough or shortness of breath.  Gastrointestinal: Denies abdominal pain, nausea, or vomiting.  Genitourinary:  Denies urinary incontinence, bladder and kidney issues  Musculoskeletal:  Denies muscle  cramps.  Neurological: Denies dizziness or focal weakness.  Psychiatric/Behavioral: Normal mental status.  Hematologic/Lymphatic: Denies bleeding problem or easy bruising/bleeding.  Skin: Denies rash or suspicious lesions.    Physical examination     Gen - No acute distress, well nourished, well groomed   Eyes - Extraoccular motions intact, pupils equally round and reactive to light and accommodation   ENT - normocephalic, atruamtic, oropharynx clear   Neck - Supple, no abnormal masses   Cardiovascular - regular rate and rhythm   Pulmonary - clear to auscultation bilaterally, no wheezes, ronchi, or rales   Abdomen - soft, non-tender, non-distended, positive bowel sounds   Psych - The patient is alert and oriented x3 with normal mood and affect    Examination of the Right Lower Extremity:     Skin intact throughout.  Motor function is intact distally EHL/FHL/TA/armando   +2 dorsalis pedis and posterior tibial pulses   Sensation to light touch intact distally dorsal, plantar, and first web space     Examination of the Right knee:    ROM 0 - 150   Effusion negative  Tenderness to palpation at the joint line negative  Pain during range of motion negative  Crepitation during range of motion negative     negative increased pain noted with flexion past 90   negative antalgic gait noted   negative Lachman's Test   negative Anterior Drawer Test   negative Posterior Drawer Test   negative McMurrays Test   negative Disco Test   negative Varus/Valgus instability    Examination of the Left Lower Extremity:     Skin intact throughout.  Motor function is intact distally EHL/FHL/TA/armando   +2 dorsalis pedis and posterior tibial pulses   Sensation to light touch intact distally dorsal, plantar, and first web space     Examination of the Left knee:    ROM 0 - 150   Effusion negative  Tenderness to palpation at the joint line negative  Pain during range of motion negative  Crepitation during range of motion negative     negative increased  pain noted with flexion past 90   negative antalgic gait noted   negative Lachman's Test   negative Anterior Drawer Test   negative Posterior Drawer Test   negative McMurrays Test   negative Disco Test   negative Varus/Valgus instability    X-ray images were examined and personally interpreted by me.  Three views of the left knee and three views of the right knee both dated 05/25/2022 show well-maintained joint spaces with no advanced arthritic changes and no acute fractures.    Dx:  Prepatellar bursitis of the right and left knee    Plan:  I explained to the patient that this is just a little swelling of her prepatellar bursa because she is down on her knees a lot when she exercises.  She verbalized understanding.  It is not really painful I think it is something we can keep an eye on.  Follow-up p.r.n..

## 2022-07-22 ENCOUNTER — HOSPITAL ENCOUNTER (OUTPATIENT)
Dept: RADIOLOGY | Facility: CLINIC | Age: 52
Discharge: HOME | End: 2022-07-22
Attending: FAMILY MEDICINE
Payer: COMMERCIAL

## 2022-07-22 DIAGNOSIS — Z12.31 OTHER SCREENING MAMMOGRAM: ICD-10-CM

## 2022-07-22 PROCEDURE — 77063 BREAST TOMOSYNTHESIS BI: CPT | Mod: TC,PO

## 2022-07-22 PROCEDURE — 77067 MAMMO DIGITAL SCREENING BILAT WITH TOMO: ICD-10-PCS | Mod: 26,,, | Performed by: RADIOLOGY

## 2022-07-22 PROCEDURE — 77063 MAMMO DIGITAL SCREENING BILAT WITH TOMO: ICD-10-PCS | Mod: 26,,, | Performed by: RADIOLOGY

## 2022-07-22 PROCEDURE — 77063 BREAST TOMOSYNTHESIS BI: CPT | Mod: 26,,, | Performed by: RADIOLOGY

## 2022-07-22 PROCEDURE — 77067 SCR MAMMO BI INCL CAD: CPT | Mod: 26,,, | Performed by: RADIOLOGY

## 2022-07-22 PROCEDURE — 77067 SCR MAMMO BI INCL CAD: CPT | Mod: TC,PO

## 2022-07-25 ENCOUNTER — TELEPHONE (OUTPATIENT)
Dept: FAMILY MEDICINE | Facility: CLINIC | Age: 52
End: 2022-07-25
Payer: COMMERCIAL

## 2022-07-25 NOTE — TELEPHONE ENCOUNTER
----- Message from Stoney Stack MD sent at 7/25/2022 12:14 PM CDT -----  Mammogram showed some calcifications in right breast. Please assist with scheduling diagnostic mammogram and US

## 2022-07-27 ENCOUNTER — TELEPHONE (OUTPATIENT)
Dept: FAMILY MEDICINE | Facility: CLINIC | Age: 52
End: 2022-07-27
Payer: COMMERCIAL

## 2022-07-27 NOTE — TELEPHONE ENCOUNTER
----- Message from Kelsie Avery sent at 7/27/2022 10:37 AM CDT -----  Regarding: rt a missed call   Type:  Patient Returning Call    Who Called: ALIDA JACOME [[id number]]    Who Left Message for Patient:unknown    Does the patient know what this is regarding? yes    Best Call Back Number:[phone]    Additional Information:

## 2022-08-10 ENCOUNTER — HOSPITAL ENCOUNTER (OUTPATIENT)
Dept: RADIOLOGY | Facility: HOSPITAL | Age: 52
Discharge: HOME | End: 2022-08-10
Attending: FAMILY MEDICINE
Payer: COMMERCIAL

## 2022-08-10 DIAGNOSIS — R92.8 ABNORMAL MAMMOGRAM OF RIGHT BREAST: ICD-10-CM

## 2022-08-10 PROCEDURE — 77065 DX MAMMO INCL CAD UNI: CPT | Mod: TC,RT

## 2022-08-10 PROCEDURE — 77061 BREAST TOMOSYNTHESIS UNI: CPT | Mod: 26,RT,, | Performed by: RADIOLOGY

## 2022-08-10 PROCEDURE — 77065 MAMMO DIGITAL DIAGNOSTIC RIGHT WITH TOMO: ICD-10-PCS | Mod: 26,RT,, | Performed by: RADIOLOGY

## 2022-08-10 PROCEDURE — 77061 MAMMO DIGITAL DIAGNOSTIC RIGHT WITH TOMO: ICD-10-PCS | Mod: 26,RT,, | Performed by: RADIOLOGY

## 2022-08-10 PROCEDURE — 77065 DX MAMMO INCL CAD UNI: CPT | Mod: 26,RT,, | Performed by: RADIOLOGY

## 2022-09-13 ENCOUNTER — OFFICE VISIT (OUTPATIENT)
Dept: FAMILY MEDICINE | Facility: CLINIC | Age: 52
End: 2022-09-13
Payer: COMMERCIAL

## 2022-09-13 VITALS
WEIGHT: 153.88 LBS | OXYGEN SATURATION: 98 % | TEMPERATURE: 98 F | BODY MASS INDEX: 24.73 KG/M2 | RESPIRATION RATE: 18 BRPM | DIASTOLIC BLOOD PRESSURE: 80 MMHG | HEIGHT: 66 IN | HEART RATE: 84 BPM | SYSTOLIC BLOOD PRESSURE: 122 MMHG

## 2022-09-13 DIAGNOSIS — R61 DIAPHORESIS: ICD-10-CM

## 2022-09-13 DIAGNOSIS — K64.9 HEMORRHOIDS, UNSPECIFIED HEMORRHOID TYPE: ICD-10-CM

## 2022-09-13 DIAGNOSIS — Z00.00 WELLNESS EXAMINATION: Primary | ICD-10-CM

## 2022-09-13 PROCEDURE — 3079F DIAST BP 80-89 MM HG: CPT | Mod: CPTII,S$GLB,, | Performed by: FAMILY MEDICINE

## 2022-09-13 PROCEDURE — 99396 PREV VISIT EST AGE 40-64: CPT | Mod: S$GLB,,, | Performed by: FAMILY MEDICINE

## 2022-09-13 PROCEDURE — 3074F SYST BP LT 130 MM HG: CPT | Mod: CPTII,S$GLB,, | Performed by: FAMILY MEDICINE

## 2022-09-13 PROCEDURE — 1159F MED LIST DOCD IN RCRD: CPT | Mod: CPTII,S$GLB,, | Performed by: FAMILY MEDICINE

## 2022-09-13 PROCEDURE — 3079F PR MOST RECENT DIASTOLIC BLOOD PRESSURE 80-89 MM HG: ICD-10-PCS | Mod: CPTII,S$GLB,, | Performed by: FAMILY MEDICINE

## 2022-09-13 PROCEDURE — 99999 PR PBB SHADOW E&M-EST. PATIENT-LVL IV: CPT | Mod: PBBFAC,,, | Performed by: FAMILY MEDICINE

## 2022-09-13 PROCEDURE — 3008F PR BODY MASS INDEX (BMI) DOCUMENTED: ICD-10-PCS | Mod: CPTII,S$GLB,, | Performed by: FAMILY MEDICINE

## 2022-09-13 PROCEDURE — 1159F PR MEDICATION LIST DOCUMENTED IN MEDICAL RECORD: ICD-10-PCS | Mod: CPTII,S$GLB,, | Performed by: FAMILY MEDICINE

## 2022-09-13 PROCEDURE — 3008F BODY MASS INDEX DOCD: CPT | Mod: CPTII,S$GLB,, | Performed by: FAMILY MEDICINE

## 2022-09-13 PROCEDURE — 99999 PR PBB SHADOW E&M-EST. PATIENT-LVL IV: ICD-10-PCS | Mod: PBBFAC,,, | Performed by: FAMILY MEDICINE

## 2022-09-13 PROCEDURE — 99396 PR PREVENTIVE VISIT,EST,40-64: ICD-10-PCS | Mod: S$GLB,,, | Performed by: FAMILY MEDICINE

## 2022-09-13 PROCEDURE — 3074F PR MOST RECENT SYSTOLIC BLOOD PRESSURE < 130 MM HG: ICD-10-PCS | Mod: CPTII,S$GLB,, | Performed by: FAMILY MEDICINE

## 2022-09-13 NOTE — PATIENT INSTRUCTIONS
Garry Oshea,     If you are due for any health screening(s) below please notify me so we can arrange them to be ordered and scheduled to maintain your health. Most healthy patients complete it. Don't lose out on improving your health.     Tests to Keep You Healthy    Mammogram: Met on 8/10/2022  Colon Cancer Screening: DUE         Colon Cancer Screening    Of cancers affecting both men and women, colorectal cancer is the third leading cancer killer in the [place]. But it doesnt have to be. Screening can prevent colorectal cancer or find it at an early stage when treatment often leads to a cure.    A colonoscopy is the preferred test for detecting colon cancer. It is needed only once every 10 years if results are negative. While sedated, a flexible, lighted tube with a tiny camera is inserted into the rectum and advanced through the colon to look for cancers. An alternative screening test that is used at home and returned to the lab may also be used. It detects hidden blood in bowel movements which could indicate cancer in the colon. If results are positive, you will need a colonoscopy to determine if the blood is a sign of cancer. This type of follow up (diagnostic) colonoscopy usually requires additional copays as required by your insurance provider. Please contact your PCP if you have any questions.    Although you are still overdue for this important screening, due to the COVID-19 pandemic, we recommend scheduling it in the near future.

## 2022-09-13 NOTE — PROGRESS NOTES
Subjective:   Patient ID: Dayo Cortes is a 51 y.o. female     Chief Complaint:Annual Exam      Here for checkup    Review of Systems   Constitutional:  Negative for chills and fever.   HENT:  Negative for sore throat and trouble swallowing.    Respiratory:  Negative for cough and shortness of breath.    Cardiovascular:  Negative for chest pain and leg swelling.   Gastrointestinal:  Negative for abdominal distention and abdominal pain.   Genitourinary:  Negative for dysuria and flank pain.   Musculoskeletal:  Negative for arthralgias and back pain.   Skin:  Negative for color change and pallor.   Neurological:  Negative for weakness and headaches.   Psychiatric/Behavioral:  Negative for agitation and confusion.    Past Medical History:   Diagnosis Date    Abnormal Pap smear 2006    once but repeated pap smear and the resulted WNLs.    Abnormal Pap smear of cervix     early 30 's became normal    GERD (gastroesophageal reflux disease)      Past Surgical History:   Procedure Laterality Date    COLONOSCOPY N/A 6/7/2017    Procedure: COLONOSCOPY;  Surgeon: Win Rodriguez MD;  Location: [facility];  Service: Endoscopy;  Laterality: N/A;    HYSTERECTOMY      LUMBAR PARAVERTEBRAL FACET JOINT NERVE BLOCK Bilateral 11/17/2020    Procedure: facet joint injection;  Surgeon: Sina Allen MD;  Location: [facility] OR;  Service: Pain Management;  Laterality: Bilateral;  L4-5, L5-S1     Objective:     Vitals:    09/13/22 0845   BP: 122/80   Pulse: 84   Resp: 18   Temp: 98.4 °F (36.9 °C)     Body mass index is 24.84 kg/m².  Physical Exam  Vitals and nursing note reviewed.   Constitutional:       Appearance: She is well-developed.   HENT:      Head: Normocephalic and atraumatic.   Eyes:      General: No scleral icterus.     Conjunctiva/sclera: Conjunctivae normal.   Cardiovascular:      Heart sounds: No murmur heard.  Pulmonary:      Effort: Pulmonary effort is normal. No respiratory distress.   Musculoskeletal:         General: No  deformity. Normal range of motion.      Cervical back: Normal range of motion and neck supple.   Skin:     Coloration: Skin is not pale.      Findings: No rash.   Neurological:      Mental Status: She is alert and oriented to person, place, and time.   Psychiatric:         Behavior: Behavior normal.         Thought Content: Thought content normal.         Judgment: Judgment normal.     Assessment:     1. Wellness examination    2. Hemorrhoids, unspecified hemorrhoid type    3. Diaphoresis      Plan:   Wellness examination  -     Ambulatory referral/consult to Colorectal Surgery; Future; Expected date: 09/20/2022  -     Hemoglobin; Future; Expected date: 09/13/2022  -     Platelet Count; Future; Expected date: 09/13/2022  -     Comprehensive Metabolic Panel; Future; Expected date: 09/13/2022  -     Lipid Panel; Future; Expected date: 09/13/2022    Hemorrhoids, unspecified hemorrhoid type  -     Ambulatory referral/consult to Colorectal Surgery; Future; Expected date: 09/20/2022  Patient notes using ETOH to assist with stress reduction in the evening    Diaphoresis  -     TSH; Future; Expected date: 09/13/2022      Established patient with me has been instructed that must see me at least 1 time yearly for refills of medications. Seeing other providers in this clinic is fine but expectation is to see me yearly.    Stoney Stack MD  09/13/2022    Portions of this note have been dictated with ODALYS Coyle

## 2022-09-14 ENCOUNTER — LAB VISIT (OUTPATIENT)
Dept: LAB | Facility: HOSPITAL | Age: 52
End: 2022-09-14
Attending: FAMILY MEDICINE
Payer: COMMERCIAL

## 2022-09-14 DIAGNOSIS — Z00.00 WELLNESS EXAMINATION: ICD-10-CM

## 2022-09-14 DIAGNOSIS — R61 DIAPHORESIS: ICD-10-CM

## 2022-09-14 LAB
ALBUMIN SERPL BCP-MCNC: 3.8 G/DL (ref 3.5–5.2)
ALP SERPL-CCNC: 60 U/L (ref 55–135)
ALT SERPL W/O P-5'-P-CCNC: 15 U/L (ref 10–44)
ANION GAP SERPL CALC-SCNC: 7 MMOL/L (ref 8–16)
AST SERPL-CCNC: 18 U/L (ref 10–40)
BILIRUB SERPL-MCNC: 0.4 MG/DL (ref 0.1–1)
BUN SERPL-MCNC: 15 MG/DL (ref 6–20)
CALCIUM SERPL-MCNC: 8.8 MG/DL (ref 8.7–10.5)
CHLORIDE SERPL-SCNC: 108 MMOL/L (ref 95–110)
CHOLEST SERPL-MCNC: 215 MG/DL (ref 120–199)
CHOLEST/HDLC SERPL: 2.7 {RATIO} (ref 2–5)
CO2 SERPL-SCNC: 27 MMOL/L (ref 23–29)
CREAT SERPL-MCNC: 1 MG/DL (ref 0.5–1.4)
EST. GFR  (NO RACE VARIABLE): >60 ML/MIN/1.73 M^2
GLUCOSE SERPL-MCNC: 91 MG/DL (ref 70–110)
HDLC SERPL-MCNC: 81 MG/DL (ref 40–75)
HDLC SERPL: 37.7 % (ref 20–50)
HGB BLD-MCNC: 11.9 G/DL (ref 12–16)
LDLC SERPL CALC-MCNC: 124.8 MG/DL (ref 63–159)
NONHDLC SERPL-MCNC: 134 MG/DL
PLATELET # BLD AUTO: 234 K/UL (ref 150–450)
PMV BLD AUTO: 10.5 FL (ref 9.2–12.9)
POTASSIUM SERPL-SCNC: 3.7 MMOL/L (ref 3.5–5.1)
PROT SERPL-MCNC: 6.7 G/DL (ref 6–8.4)
SODIUM SERPL-SCNC: 142 MMOL/L (ref 136–145)
TRIGL SERPL-MCNC: 46 MG/DL (ref 30–150)
TSH SERPL DL<=0.005 MIU/L-ACNC: 1.14 UIU/ML (ref 0.4–4)

## 2022-09-14 PROCEDURE — 85018 HEMOGLOBIN: CPT | Performed by: FAMILY MEDICINE

## 2022-09-14 PROCEDURE — 85049 AUTOMATED PLATELET COUNT: CPT | Performed by: FAMILY MEDICINE

## 2022-09-14 PROCEDURE — 80061 LIPID PANEL: CPT | Performed by: FAMILY MEDICINE

## 2022-09-14 PROCEDURE — 84443 ASSAY THYROID STIM HORMONE: CPT | Performed by: FAMILY MEDICINE

## 2022-09-14 PROCEDURE — 80053 COMPREHEN METABOLIC PANEL: CPT | Performed by: FAMILY MEDICINE

## 2022-09-14 PROCEDURE — 36415 COLL VENOUS BLD VENIPUNCTURE: CPT | Mod: PO | Performed by: FAMILY MEDICINE

## 2022-09-16 DIAGNOSIS — R61 EXCESSIVE SWEATING: Primary | ICD-10-CM

## 2022-09-19 ENCOUNTER — TELEPHONE (OUTPATIENT)
Dept: FAMILY MEDICINE | Facility: CLINIC | Age: 52
End: 2022-09-19
Payer: COMMERCIAL

## 2022-09-21 ENCOUNTER — LAB VISIT (OUTPATIENT)
Dept: LAB | Facility: HOSPITAL | Age: 52
End: 2022-09-21
Attending: PHYSICIAN ASSISTANT
Payer: COMMERCIAL

## 2022-09-21 ENCOUNTER — OFFICE VISIT (OUTPATIENT)
Dept: ENDOCRINOLOGY | Facility: CLINIC | Age: 52
End: 2022-09-21
Payer: COMMERCIAL

## 2022-09-21 VITALS
HEIGHT: 66 IN | TEMPERATURE: 99 F | DIASTOLIC BLOOD PRESSURE: 82 MMHG | HEART RATE: 80 BPM | SYSTOLIC BLOOD PRESSURE: 120 MMHG | BODY MASS INDEX: 24.4 KG/M2 | WEIGHT: 151.81 LBS | OXYGEN SATURATION: 98 %

## 2022-09-21 DIAGNOSIS — R61 EXCESSIVE SWEATING: ICD-10-CM

## 2022-09-21 DIAGNOSIS — E04.1 NODULAR THYROID DISEASE: Primary | ICD-10-CM

## 2022-09-21 LAB — CORTIS SERPL-MCNC: 9.4 UG/DL

## 2022-09-21 PROCEDURE — 3008F BODY MASS INDEX DOCD: CPT | Mod: CPTII,S$GLB,, | Performed by: PHYSICIAN ASSISTANT

## 2022-09-21 PROCEDURE — 3079F PR MOST RECENT DIASTOLIC BLOOD PRESSURE 80-89 MM HG: ICD-10-PCS | Mod: CPTII,S$GLB,, | Performed by: PHYSICIAN ASSISTANT

## 2022-09-21 PROCEDURE — 83835 ASSAY OF METANEPHRINES: CPT | Performed by: PHYSICIAN ASSISTANT

## 2022-09-21 PROCEDURE — 99213 PR OFFICE/OUTPT VISIT, EST, LEVL III, 20-29 MIN: ICD-10-PCS | Mod: S$GLB,,, | Performed by: PHYSICIAN ASSISTANT

## 2022-09-21 PROCEDURE — 36415 COLL VENOUS BLD VENIPUNCTURE: CPT | Mod: PO | Performed by: PHYSICIAN ASSISTANT

## 2022-09-21 PROCEDURE — 1160F RVW MEDS BY RX/DR IN RCRD: CPT | Mod: CPTII,S$GLB,, | Performed by: PHYSICIAN ASSISTANT

## 2022-09-21 PROCEDURE — 1160F PR REVIEW ALL MEDS BY PRESCRIBER/CLIN PHARMACIST DOCUMENTED: ICD-10-PCS | Mod: CPTII,S$GLB,, | Performed by: PHYSICIAN ASSISTANT

## 2022-09-21 PROCEDURE — 3079F DIAST BP 80-89 MM HG: CPT | Mod: CPTII,S$GLB,, | Performed by: PHYSICIAN ASSISTANT

## 2022-09-21 PROCEDURE — 99999 PR PBB SHADOW E&M-EST. PATIENT-LVL IV: ICD-10-PCS | Mod: PBBFAC,,, | Performed by: PHYSICIAN ASSISTANT

## 2022-09-21 PROCEDURE — 3008F PR BODY MASS INDEX (BMI) DOCUMENTED: ICD-10-PCS | Mod: CPTII,S$GLB,, | Performed by: PHYSICIAN ASSISTANT

## 2022-09-21 PROCEDURE — 3074F PR MOST RECENT SYSTOLIC BLOOD PRESSURE < 130 MM HG: ICD-10-PCS | Mod: CPTII,S$GLB,, | Performed by: PHYSICIAN ASSISTANT

## 2022-09-21 PROCEDURE — 99213 OFFICE O/P EST LOW 20 MIN: CPT | Mod: S$GLB,,, | Performed by: PHYSICIAN ASSISTANT

## 2022-09-21 PROCEDURE — 1159F PR MEDICATION LIST DOCUMENTED IN MEDICAL RECORD: ICD-10-PCS | Mod: CPTII,S$GLB,, | Performed by: PHYSICIAN ASSISTANT

## 2022-09-21 PROCEDURE — 82024 ASSAY OF ACTH: CPT | Performed by: PHYSICIAN ASSISTANT

## 2022-09-21 PROCEDURE — 99999 PR PBB SHADOW E&M-EST. PATIENT-LVL IV: CPT | Mod: PBBFAC,,, | Performed by: PHYSICIAN ASSISTANT

## 2022-09-21 PROCEDURE — 1159F MED LIST DOCD IN RCRD: CPT | Mod: CPTII,S$GLB,, | Performed by: PHYSICIAN ASSISTANT

## 2022-09-21 PROCEDURE — 3074F SYST BP LT 130 MM HG: CPT | Mod: CPTII,S$GLB,, | Performed by: PHYSICIAN ASSISTANT

## 2022-09-21 PROCEDURE — 82533 TOTAL CORTISOL: CPT | Performed by: PHYSICIAN ASSISTANT

## 2022-09-21 NOTE — PROGRESS NOTES
Chief Complaint: Thyroid Nodule    HPI: Dayo Cortes is a 51 y.o. female who is here for an initial evaluation for thyroid.    With regards to the thyroid nodule:  The thyroid nodule was found about a year ago. Had throat swelling, neck pain in the back. Got US 2019 with nodules, then repeat in 2020. Her aunt had hashimoto's disease.    Last ultrasound: 8/22/2020   on review, multiple nodules, most small.    Left side 1 cm, mostly isoechoic. Also had a 1.24 cm isoechoic nodule on the left. Rest were less than 1 cm in size. No sob, dysphagia or voice changes.    FNA done? none  Lab Results   Component Value Date    TSH 1.137 09/14/2022    E3NMPWS 5.7 10/03/2016    FREET4 1.14 06/18/2019     Patient denies: difficulty breathing or swallowing, voice changes, FH of thyroid cancer, personal history of radiation exposure, diarrhea/constipation and palpitations    Pt reports excessive sweating that occurs at any time. She does not feel hot. She takes mv daily. She will have to wear specific clothes to prevent stains. Nothing makes the sweating better or worse. Red wine will bring on hot flashes. + fatigue, heat intolerance. No palpitations, hair loss, headaches, muscle weakness, constipation, diarrhea. No HTN.   S/p hysterectomy in 2019.     Reviewed past medical, family, social history and updated as appropriate.    Wt Readings from Last 6 Encounters:   09/21/22 68.9 kg (151 lb 12.6 oz)   09/13/22 69.8 kg (153 lb 14.1 oz)   06/21/22 69.9 kg (154 lb)   05/25/22 70.2 kg (154 lb 12.2 oz)   02/09/22 73.9 kg (162 lb 14.7 oz)   06/24/21 73.9 kg (163 lb)      ROS:   Constitutional: + wt loss (12 lbs since 6/21).  Eyes: No recent visual changes  Cardiovascular: Denies current anginal symptoms  Respiratory: Denies current respiratory difficulty  Gastrointestinal: Denies recent bowel disturbances  GenitoUrinary - No dysuria  Skin: No new skin rash  Neurologic: No focal neurologic complaints  Remainder ROS negative     BP  "120/82 (BP Location: Left arm, Patient Position: Sitting, BP Method: Small (Manual))   Pulse 80   Temp 98.5 °F (36.9 °C) (Oral)   Ht 5' 6" (1.676 m)   Wt 68.9 kg (151 lb 12.6 oz)   LMP 03/12/2019 (Exact Date)   SpO2 98%   BMI 24.50 kg/m²      BP Readings from Last 5 Encounters:   09/21/22 120/82   09/13/22 122/80   05/25/22 120/78   06/24/21 108/73   06/16/21 120/78     Physical Exam  Vitals reviewed.   Constitutional:       Appearance: She is well-developed.   HENT:      Head: Normocephalic and atraumatic.   Neck:      Thyroid: Thyromegaly present.      Comments: Some lymph nodes present  Cardiovascular:      Rate and Rhythm: Normal rate and regular rhythm.      Heart sounds: No murmur heard.  Pulmonary:      Effort: Pulmonary effort is normal.      Breath sounds: Normal breath sounds.   Abdominal:      General: There is no distension.      Palpations: Abdomen is soft. There is no mass.      Tenderness: There is no abdominal tenderness.   Musculoskeletal:         General: No tenderness. Normal range of motion.      Cervical back: Normal range of motion and neck supple. Muscular tenderness (mild) present.   Neurological:      Mental Status: She is alert and oriented to person, place, and time.   Psychiatric:         Judgment: Judgment normal.     Wt Readings from Last 5 Encounters:   09/21/22 0811 68.9 kg (151 lb 12.6 oz)   09/13/22 0845 69.8 kg (153 lb 14.1 oz)   06/21/22 0916 69.9 kg (154 lb)   05/25/22 1506 70.2 kg (154 lb 12.2 oz)   02/09/22 0815 73.9 kg (162 lb 14.7 oz)     Lab Results   Component Value Date    HGBA1C 5.2 05/27/2021    HGBA1C 5.1 11/04/2016     Lab Results   Component Value Date    CHOL 215 (H) 09/14/2022    CHOL 220 (H) 05/27/2021    HDL 81 (H) 09/14/2022    HDL 83 (H) 05/27/2021    LDLCALC 124.8 09/14/2022    LDLCALC 129.0 05/27/2021    TRIG 46 09/14/2022    TRIG 40 05/27/2021    CHOLHDL 37.7 09/14/2022    CHOLHDL 37.7 05/27/2021     Lab Results   Component Value Date     " 09/14/2022    K 3.7 09/14/2022     09/14/2022    CO2 27 09/14/2022    GLU 91 09/14/2022    BUN 15 09/14/2022    CREATININE 1.0 09/14/2022    CALCIUM 8.8 09/14/2022    PROT 6.7 09/14/2022    ALBUMIN 3.8 09/14/2022    BILITOT 0.4 09/14/2022    ALKPHOS 60 09/14/2022    AST 18 09/14/2022    ALT 15 09/14/2022    ANIONGAP 7 (L) 09/14/2022    ESTGFRAFRICA >60.0 05/27/2021    EGFRNONAA >60.0 05/27/2021    TSH 1.137 09/14/2022      No results found for: MICALBCREAT    1. Nodular thyroid disease  US Soft Tissue Head Neck Thyroid    T4, Free    TSH    US Soft Tissue Head Neck Thyroid    CANCELED: Calcitonin    CANCELED: Thyroid Peroxidase Antibody      2. Excessive sweating  Ambulatory referral/consult to Endocrinology    Cortisol    ACTH    Metanephrines, Plasma Free         Thyroid nodule  Multiple thyroid nodules seen on last US   - repeat thyroid u/s   - on personal review of images, most are <1cm   - does have 2 at/above 1 cm in size, both appear predominantly isoechoic. Discussed with patient these do not meet FNA criteria at this time   - recommend repeat ultrasound after 1 year or so. If they remain stable, would spread out ultrasound to 2 years and then consider stopping.   - reviewed patient labs, TSH normal, discussed with her that nodules and thyroid function are separate and her thyroid function is fine.     Excessive sweating-will check cortisol/metanephrines. No hx of HTN so do not need A/R ratio. Pt may need higher dose of estradiol if those are normal.      Labs today  F/u in one year w/ TSH, T4 and thyroid u/s

## 2022-09-23 LAB — ACTH PLAS-MCNC: 9 PG/ML (ref 0–46)

## 2022-09-30 LAB
METANEPH FREE SERPL-MCNC: <25 PG/ML
METANEPHS SERPL-MCNC: 110 PG/ML
NORMETANEPH FREE SERPL-MCNC: 110 PG/ML

## 2022-10-17 ENCOUNTER — OFFICE VISIT (OUTPATIENT)
Dept: SURGERY | Facility: CLINIC | Age: 52
End: 2022-10-17
Payer: COMMERCIAL

## 2022-10-17 VITALS
TEMPERATURE: 99 F | HEART RATE: 96 BPM | HEIGHT: 66 IN | DIASTOLIC BLOOD PRESSURE: 70 MMHG | SYSTOLIC BLOOD PRESSURE: 116 MMHG | WEIGHT: 152.75 LBS | BODY MASS INDEX: 24.55 KG/M2 | RESPIRATION RATE: 16 BRPM | OXYGEN SATURATION: 98 %

## 2022-10-17 DIAGNOSIS — K64.8 BLEEDING INTERNAL HEMORRHOIDS: ICD-10-CM

## 2022-10-17 DIAGNOSIS — Z00.00 WELLNESS EXAMINATION: ICD-10-CM

## 2022-10-17 DIAGNOSIS — K64.9 HEMORRHOIDS, UNSPECIFIED HEMORRHOID TYPE: ICD-10-CM

## 2022-10-17 DIAGNOSIS — K64.2 PROLAPSED INTERNAL HEMORRHOIDS, GRADE 3: Primary | ICD-10-CM

## 2022-10-17 PROCEDURE — 1159F MED LIST DOCD IN RCRD: CPT | Mod: CPTII,S$GLB,, | Performed by: COLON & RECTAL SURGERY

## 2022-10-17 PROCEDURE — 46221 LIGATION OF HEMORRHOID(S): CPT | Mod: S$GLB,,, | Performed by: COLON & RECTAL SURGERY

## 2022-10-17 PROCEDURE — 46221 PR HEMORRHOIDECTOMY INTERNAL RUBBER BAND LIGATIONS: ICD-10-PCS | Mod: S$GLB,,, | Performed by: COLON & RECTAL SURGERY

## 2022-10-17 PROCEDURE — 99999 PR PBB SHADOW E&M-EST. PATIENT-LVL IV: CPT | Mod: PBBFAC,,, | Performed by: COLON & RECTAL SURGERY

## 2022-10-17 PROCEDURE — 99203 OFFICE O/P NEW LOW 30 MIN: CPT | Mod: 25,S$GLB,, | Performed by: COLON & RECTAL SURGERY

## 2022-10-17 PROCEDURE — 1159F PR MEDICATION LIST DOCUMENTED IN MEDICAL RECORD: ICD-10-PCS | Mod: CPTII,S$GLB,, | Performed by: COLON & RECTAL SURGERY

## 2022-10-17 PROCEDURE — 99203 PR OFFICE/OUTPT VISIT, NEW, LEVL III, 30-44 MIN: ICD-10-PCS | Mod: 25,S$GLB,, | Performed by: COLON & RECTAL SURGERY

## 2022-10-17 PROCEDURE — 3074F SYST BP LT 130 MM HG: CPT | Mod: CPTII,S$GLB,, | Performed by: COLON & RECTAL SURGERY

## 2022-10-17 PROCEDURE — 3074F PR MOST RECENT SYSTOLIC BLOOD PRESSURE < 130 MM HG: ICD-10-PCS | Mod: CPTII,S$GLB,, | Performed by: COLON & RECTAL SURGERY

## 2022-10-17 PROCEDURE — 3078F PR MOST RECENT DIASTOLIC BLOOD PRESSURE < 80 MM HG: ICD-10-PCS | Mod: CPTII,S$GLB,, | Performed by: COLON & RECTAL SURGERY

## 2022-10-17 PROCEDURE — 99999 PR PBB SHADOW E&M-EST. PATIENT-LVL IV: ICD-10-PCS | Mod: PBBFAC,,, | Performed by: COLON & RECTAL SURGERY

## 2022-10-17 PROCEDURE — 3078F DIAST BP <80 MM HG: CPT | Mod: CPTII,S$GLB,, | Performed by: COLON & RECTAL SURGERY

## 2022-10-17 NOTE — PROGRESS NOTES
CRS Office Visit History and Physical    Referring Md:   Stoney Stack Md  [address] [address]    SUBJECTIVE:     Chief Complaint: hemorrhoids    History of Present Illness:  Patient is a 51 y.o. female presents with long hx of hemorrhoids  2x week swelling, discomfort, Prep H topically  Protrusion with BM.  Blood both in bowl and on TP  Can't reduce  BM QOD to QD.  No straining.  However, soft, wipes a lot.      Last Colonoscopy: 4 years ago.  Hx of polyps      Past Medical History:   Diagnosis Date    Abnormal Pap smear 2006    once but repeated pap smear and the resulted WNLs.    Abnormal Pap smear of cervix     early 30 's became normal    GERD (gastroesophageal reflux disease)        Past Surgical History:   Procedure Laterality Date    COLONOSCOPY N/A 6/7/2017    Procedure: COLONOSCOPY;  Surgeon: Win Rodriguez MD;  Location: [facility];  Service: Endoscopy;  Laterality: N/A;    HYSTERECTOMY      LUMBAR PARAVERTEBRAL FACET JOINT NERVE BLOCK Bilateral 11/17/2020    Procedure: facet joint injection;  Surgeon: Sina Allen MD;  Location: [facility] OR;  Service: Pain Management;  Laterality: Bilateral;  L4-5, L5-S1       Review of patient's allergies indicates:  No Known Allergies    Current Outpatient Medications on File Prior to Visit   Medication Sig Dispense Refill    acyclovir (ZOVIRAX) 400 MG tablet Take 1 tablet (400 mg total) by mouth 2 (two) times daily. 180 tablet 3    estradioL (ESTRACE) 1 MG tablet TK 1 T PO QD      multivitamin capsule Take 1 capsule by mouth once daily.       No current facility-administered medications on file prior to visit.       Family History   Problem Relation Age of Onset    Breast cancer Mother 40        unconfirmed.    Diabetes Maternal Grandmother     Thyroid disease Maternal Aunt         nodules and hashimotos    Ovarian cancer Neg Hx     Colon cancer Neg Hx        Social History     Tobacco Use    Smoking status: Never    Smokeless tobacco: Never  "  Substance Use Topics    Alcohol use: Yes     Comment: occasional    Drug use: No        Review of Systems:  ROS:  GENERAL: No fever, chills, fatigability or weight loss.  Integument:  No rashes, redness, icterus  CHEST: Denies PIEDRA, cyanosis, wheezing, cough and sputum production.  CARDIOVASCULAR: Denies chest pain, PND, orthopnea or reduced exercise tolerance.  GI:  Denies abd pain, dysphagia, nausea, vomiting, no hematemesis, no rectal pain  : Denies burning on urination, no hematuria, no bacteriuria  MSK:  No deformities, swelling, joint pain swelling  Neurologic:  No HAs, seizures, weakness, paresthesias, gait problems      OBJECTIVE:     Vital Signs (Most Recent)  /70 (BP Location: Left arm, Patient Position: Sitting, BP Method: Medium (Manual))   Pulse 96   Temp 98.5 °F (36.9 °C)   Resp 16   Ht 5' 6" (1.676 m)   Wt 69.3 kg (152 lb 12.5 oz)   LMP 03/12/2019 (Exact Date)   SpO2 98%   BMI 24.66 kg/m²     Physical Exam:  General: Black or  female in no distress   Skin/ Sclera anicteric  HEENT: anicteric, normocephalic, extraocular movements intact   Neck trachea midline  Chest symmetric, nl excursions, no retractions  Respiratory: respirations are even and unlabored     Anorectal:   Inspection       JT:  nl tone, no masses, good squeeze, nl relaxation with Valsalva  Extremities: Warm dry and intact.  NO CCE  Neuro: alert and oriented x 4.  Moves all extremities.         ASSESSMENT/PLAN:   Bleeding and prolapsing hemorrhoids      Recommend  Anoscopy, banding  High fiber diet - 25 grams per day.  Emphasis on whole grain breads such as double fiber wheat bread and high fiber cereals and bars.    Drink 8 glasses of water per day 64 - 96 oz per day  Fiber supplements such as KONSYL, METAMUCIL can be taken 1-2 x per day  Regular exercise - 30 min brisk walking 5 days per week  OV 6 weeks      Hemorrhoid Rubber Band Ligation Procedure Note    Verbal consent obtained for anoscopy and " rubber band ligation.    The details of the procedure and the expected outcome discussed..  The risk of persistent symptoms, need for repeat banding, bleeding, infection discussed with pt.      Indications: The patient had a history of bleeding and prolapsing  internal hemorrhoids.    Pre-operative Diagnosis: Internal hemorrhoids with bleeding and prolapse     Post-operative Diagnosis: Internal hemorrhoids with bleeding and prolapse    Surgeon: JAIRO CIFUENTES     Assistants:     Findings at anoscopy:    Left lateral internal hemorrhoid grade 1  Right anterior internal hemorrhoid grade 3  Right posterior internal hemorrhoid grade 3    Procedure Details   Acritical timeout was performed.  Digital rectal exam was performed.  I then placed a lubricated anoscope into the anal canal. Suction band ligation was then performed of the internal hemorrhoids in the  right anterior, right posterior positions.             Complications:  None; patient tolerated the procedure well.           Disposition: Discharge from office to home           Condition: stable    Instruction sheet given to patient  High fiber diet  Drink eight glasses of water per day  Miralax as necessary  OV in 6 weeks

## 2022-10-19 ENCOUNTER — TELEPHONE (OUTPATIENT)
Dept: SURGERY | Facility: CLINIC | Age: 52
End: 2022-10-19
Payer: COMMERCIAL

## 2022-10-19 NOTE — TELEPHONE ENCOUNTER
Called patient to follow up on e-mail received from the Patient and Provider Advocacy department with concerns related to her clinic visit with Dr. Mcgee  on 10/17. She felt she was not treated well before and after the rubber band ligation procedure. She reports having this same procedure many years ago and the recovery was very different, less painful. She reports minimal pain today and denies having difficulty urinating or having bowel movements. Post RBL instructions reviewed. Questions answered related to the high fiber diet recommendation to promote colon/rectal health indicated on post procedure instructions. My direct contact # was provided for any future issues, concerns. I thanked her for her feedback and apologized if she felt she had an unfavorable experience.

## 2022-10-21 ENCOUNTER — TELEPHONE (OUTPATIENT)
Dept: SURGERY | Facility: CLINIC | Age: 52
End: 2022-10-21
Payer: COMMERCIAL

## 2022-10-21 NOTE — TELEPHONE ENCOUNTER
Called pt to let her know about the appt scheduled for her today in [facility] Rect[facility]. Pt asked me to cancel it because she is getting an appt outside of [facility].

## 2022-10-21 NOTE — TELEPHONE ENCOUNTER
----- Message from Amelia Dubois RN sent at 10/21/2022 10:23 AM CDT -----  I scheduled her for 2:40 pm.  ----- Message -----  From: Buffy Barkley LPN  Sent: 10/21/2022  10:19 AM CDT  To: Amelia Dubois RN    Angelica is fine. Forgot she is here.   Buffy  ----- Message -----  From: Amelia Dubois RN  Sent: 10/21/2022  10:16 AM CDT  To: Buffy Barkley LPN    Can she see Angelica this afternoon?    Or does she need to see Annamarie?  ----- Message -----  From: Buffy Barkley LPN  Sent: 10/21/2022  10:13 AM CDT  To: Amelia Dubois RN    Can you please schedule her with Jennifer today.   Buffy  ----- Message -----  From: Nimisha Tadeo RN  Sent: 10/21/2022   9:49 AM CDT  To: TAMI Lin! Can you please call this patient today. She would like a FU appointment soon for RBL but not with Dr. Mcgee. I will forward the e-mail outlining her concerns.   Thanks.

## 2022-10-24 ENCOUNTER — TELEPHONE (OUTPATIENT)
Dept: SURGERY | Facility: CLINIC | Age: 52
End: 2022-10-24
Payer: COMMERCIAL

## 2022-10-24 NOTE — TELEPHONE ENCOUNTER
Late Entry:  10/21-Received phone call on my personal cell phone device from patient. She requested her follow up scheduled next week to be with a different provider. Informed her I was currently off site but would have Buffy Barkley LPN (assigned charge nurse in my absence) call her to assist with appointment. She reports she is still having symptoms (pressure) from the RBL procedure 10/17. She voiced concern that we have not offered to see her sooner due to continued symptoms of pressure.She states she feels like she is swollen internally and externally in her rectal area. Just uncomfortable.Denies fever,pain, difficulty urinating. I reminded her in our last conversation she denied any concerns other than pressure from swelling and all other symptoms had resolved.She was instructed to call for any future concerns. Assured her we do offer same day appointments if requested,indicated.Offered appointment today.Refused.    Received a text that she would like a sooner appointment. Buffy Barkley notified. Appointment schedule today with Dr. Dominguez. Patient refused appointment.   10/24 7:47 am. Received a text saying she has not received a survey and requested I send one to her. She is still having issues.  I responded letting her know I did not handle sending out survey's that it is sent out by a specific department. I informed her I will reach out today to find out the process. I offered her an appointment today on [facility].   She responded she is seeing a specialist today and will provide an update after.She wanted me to be aware it is still problematic.She will be in touch after her appointment.

## 2022-10-31 ENCOUNTER — PATIENT MESSAGE (OUTPATIENT)
Dept: FAMILY MEDICINE | Facility: CLINIC | Age: 52
End: 2022-10-31
Payer: COMMERCIAL

## 2022-11-03 ENCOUNTER — OFFICE VISIT (OUTPATIENT)
Dept: FAMILY MEDICINE | Facility: CLINIC | Age: 52
End: 2022-11-03
Payer: COMMERCIAL

## 2022-11-03 DIAGNOSIS — G89.18 POST-OP PAIN: Primary | ICD-10-CM

## 2022-11-03 PROCEDURE — 99499 UNLISTED E&M SERVICE: CPT | Mod: 95,,, | Performed by: FAMILY MEDICINE

## 2022-11-03 PROCEDURE — 99499 NO LOS: ICD-10-PCS | Mod: 95,,, | Performed by: FAMILY MEDICINE

## 2023-02-07 ENCOUNTER — OFFICE VISIT (OUTPATIENT)
Dept: FAMILY MEDICINE | Facility: CLINIC | Age: 53
End: 2023-02-07
Payer: COMMERCIAL

## 2023-02-07 VITALS
HEIGHT: 66 IN | OXYGEN SATURATION: 99 % | SYSTOLIC BLOOD PRESSURE: 122 MMHG | HEART RATE: 76 BPM | WEIGHT: 152.13 LBS | TEMPERATURE: 98 F | DIASTOLIC BLOOD PRESSURE: 68 MMHG | BODY MASS INDEX: 24.45 KG/M2

## 2023-02-07 DIAGNOSIS — L60.0: Primary | ICD-10-CM

## 2023-02-07 DIAGNOSIS — L03.011 CELLULITIS OF FINGER OF RIGHT HAND: ICD-10-CM

## 2023-02-07 PROCEDURE — 3008F BODY MASS INDEX DOCD: CPT | Mod: CPTII,S$GLB,, | Performed by: NURSE PRACTITIONER

## 2023-02-07 PROCEDURE — 1159F PR MEDICATION LIST DOCUMENTED IN MEDICAL RECORD: ICD-10-PCS | Mod: CPTII,S$GLB,, | Performed by: NURSE PRACTITIONER

## 2023-02-07 PROCEDURE — 99999 PR PBB SHADOW E&M-EST. PATIENT-LVL III: CPT | Mod: PBBFAC,,, | Performed by: NURSE PRACTITIONER

## 2023-02-07 PROCEDURE — 3074F PR MOST RECENT SYSTOLIC BLOOD PRESSURE < 130 MM HG: ICD-10-PCS | Mod: CPTII,S$GLB,, | Performed by: NURSE PRACTITIONER

## 2023-02-07 PROCEDURE — 3074F SYST BP LT 130 MM HG: CPT | Mod: CPTII,S$GLB,, | Performed by: NURSE PRACTITIONER

## 2023-02-07 PROCEDURE — 3078F PR MOST RECENT DIASTOLIC BLOOD PRESSURE < 80 MM HG: ICD-10-PCS | Mod: CPTII,S$GLB,, | Performed by: NURSE PRACTITIONER

## 2023-02-07 PROCEDURE — 3008F PR BODY MASS INDEX (BMI) DOCUMENTED: ICD-10-PCS | Mod: CPTII,S$GLB,, | Performed by: NURSE PRACTITIONER

## 2023-02-07 PROCEDURE — 99214 PR OFFICE/OUTPT VISIT, EST, LEVL IV, 30-39 MIN: ICD-10-PCS | Mod: S$GLB,,, | Performed by: NURSE PRACTITIONER

## 2023-02-07 PROCEDURE — 3078F DIAST BP <80 MM HG: CPT | Mod: CPTII,S$GLB,, | Performed by: NURSE PRACTITIONER

## 2023-02-07 PROCEDURE — 99214 OFFICE O/P EST MOD 30 MIN: CPT | Mod: S$GLB,,, | Performed by: NURSE PRACTITIONER

## 2023-02-07 PROCEDURE — 99999 PR PBB SHADOW E&M-EST. PATIENT-LVL III: ICD-10-PCS | Mod: PBBFAC,,, | Performed by: NURSE PRACTITIONER

## 2023-02-07 PROCEDURE — 1159F MED LIST DOCD IN RCRD: CPT | Mod: CPTII,S$GLB,, | Performed by: NURSE PRACTITIONER

## 2023-02-07 RX ORDER — DOXYCYCLINE HYCLATE 100 MG
100 TABLET ORAL 2 TIMES DAILY
Qty: 20 TABLET | Refills: 0 | Status: SHIPPED | OUTPATIENT
Start: 2023-02-07 | End: 2023-04-04 | Stop reason: CLARIF

## 2023-02-07 RX ORDER — FLUCONAZOLE 150 MG/1
150 TABLET ORAL DAILY
Qty: 1 TABLET | Refills: 1 | Status: SHIPPED | OUTPATIENT
Start: 2023-02-07 | End: 2023-08-08 | Stop reason: ALTCHOICE

## 2023-02-07 NOTE — PROGRESS NOTES
This dictation has been generated using Modal Fluency Dictation some phonetic errors may occur. Please contact author for clarification if needed.     Problem List Items Addressed This Visit    None  Visit Diagnoses       Ingrown nail of middle finger    -  Primary    Cellulitis of finger of right hand                Orders Placed This Encounter    (In Office Administered) Tdap Vaccine    doxycycline (VIBRA-TABS) 100 MG tablet    fluconazole (DIFLUCAN) 150 MG Tab     Ingrown nail right middle finger.  Doxycycline as above empiric therapy for yeast vaginitis with Diflucan.    Examination of affected area does not reveal abscess collection I see no benefit in traumatizing the area   Update tetanus shot    No follow-ups on file.    ________________________________________________________________  ________________________________________________________________      Chief Complaint   Patient presents with    swollen finger     History of present illness  This 52 y.o. presents today for complaint of ingrown nail right middle finger.  Patient notes symptoms started about a week ago.  She had pull on the hang nail previously.  Notes swelling and tenderness.  No active drainage.  No loss of function.  She is right-handed.    Past Medical History:   Diagnosis Date    Abnormal Pap smear 2006    once but repeated pap smear and the resulted WNLs.    Abnormal Pap smear of cervix     early 30 's became normal    GERD (gastroesophageal reflux disease)        Past Surgical History:   Procedure Laterality Date    COLONOSCOPY N/A 6/7/2017    Procedure: COLONOSCOPY;  Surgeon: Win Rodriguez MD;  Location: [facility];  Service: Endoscopy;  Laterality: N/A;    HYSTERECTOMY      LUMBAR PARAVERTEBRAL FACET JOINT NERVE BLOCK Bilateral 11/17/2020    Procedure: facet joint injection;  Surgeon: Sina Allen MD;  Location: [facility] OR;  Service: Pain Management;  Laterality: Bilateral;  L4-5, L5-S1       Family History   Problem Relation Age of  Onset    Breast cancer Mother 40        unconfirmed.    Diabetes Maternal Grandmother     Thyroid disease Maternal Aunt         nodules and hashimotos    Ovarian cancer Neg Hx     Colon cancer Neg Hx        Social History     Socioeconomic History    Marital status: Single   Tobacco Use    Smoking status: Never    Smokeless tobacco: Never   Substance and Sexual Activity    Alcohol use: Yes     Comment: occasional    Drug use: No    Sexual activity: Yes     Partners: Male     Birth control/protection: Condom     Social Determinants of Health     Physical Activity: Unknown    Days of Exercise per Week: 3 days   Housing Stability: Unknown    Unable to Pay for Housing in the Last Year: Patient refused    Unstable Housing in the Last Year: Patient refused       Current Outpatient Medications   Medication Sig Dispense Refill    acyclovir (ZOVIRAX) 400 MG tablet Take 1 tablet (400 mg total) by mouth 2 (two) times daily. 180 tablet 3    estradioL (ESTRACE) 1 MG tablet TK 1 T PO QD      multivitamin capsule Take 1 capsule by mouth once daily.      doxycycline (VIBRA-TABS) 100 MG tablet Take 1 tablet (100 mg total) by mouth 2 (two) times daily. 20 tablet 0    fluconazole (DIFLUCAN) 150 MG Tab Take 1 tablet (150 mg total) by mouth once daily. 1 tablet 1     No current facility-administered medications for this visit.       Review of patient's allergies indicates:  No Known Allergies    Physical examination  Vitals Reviewed\  Vitals:    02/07/23 0819   BP: 122/68   Pulse: 76   Temp: 98.4 °F (36.9 °C)     Body mass index is 24.55 kg/m².     Weight: 69 kg (152 lb 1.9 oz)    Gen. Well-dressed well-nourished   Skin warm dry and intact.  No rashes noted.  Chest.  Respirations are even unlabored.  Lungs are clear to auscultation.  Cardiac regular rate and rhythm.  No chest wall adenopathy noted.  Neuro. Awake alert oriented x4.  Normal judgment and cognition noted.  Extremities no clubbing cyanosis or edema noted.          Call or  return to clinic prn if these symptoms worsen or fail to improve as anticipated.

## 2023-03-30 ENCOUNTER — HOSPITAL ENCOUNTER (OUTPATIENT)
Dept: RADIOLOGY | Facility: HOSPITAL | Age: 53
Discharge: HOME | End: 2023-03-30
Attending: ORTHOPAEDIC SURGERY
Payer: COMMERCIAL

## 2023-03-30 ENCOUNTER — OFFICE VISIT (OUTPATIENT)
Dept: ORTHOPEDICS | Facility: CLINIC | Age: 53
End: 2023-03-30
Payer: COMMERCIAL

## 2023-03-30 VITALS — BODY MASS INDEX: 24.45 KG/M2 | RESPIRATION RATE: 18 BRPM | WEIGHT: 152.13 LBS | HEIGHT: 66 IN

## 2023-03-30 DIAGNOSIS — M25.561 CHRONIC PAIN OF RIGHT KNEE: ICD-10-CM

## 2023-03-30 DIAGNOSIS — G89.29 CHRONIC PAIN OF RIGHT KNEE: Primary | ICD-10-CM

## 2023-03-30 DIAGNOSIS — M25.561 CHRONIC PAIN OF RIGHT KNEE: Primary | ICD-10-CM

## 2023-03-30 DIAGNOSIS — M70.41 PREPATELLAR BURSITIS OF RIGHT KNEE: Primary | ICD-10-CM

## 2023-03-30 DIAGNOSIS — Z01.818 PREOP TESTING: ICD-10-CM

## 2023-03-30 DIAGNOSIS — G89.29 CHRONIC PAIN OF RIGHT KNEE: ICD-10-CM

## 2023-03-30 PROCEDURE — 1160F PR REVIEW ALL MEDS BY PRESCRIBER/CLIN PHARMACIST DOCUMENTED: ICD-10-PCS | Mod: CPTII,S$GLB,, | Performed by: ORTHOPAEDIC SURGERY

## 2023-03-30 PROCEDURE — 1159F MED LIST DOCD IN RCRD: CPT | Mod: CPTII,S$GLB,, | Performed by: ORTHOPAEDIC SURGERY

## 2023-03-30 PROCEDURE — 99215 OFFICE O/P EST HI 40 MIN: CPT | Mod: 57,S$GLB,, | Performed by: ORTHOPAEDIC SURGERY

## 2023-03-30 PROCEDURE — 1159F PR MEDICATION LIST DOCUMENTED IN MEDICAL RECORD: ICD-10-PCS | Mod: CPTII,S$GLB,, | Performed by: ORTHOPAEDIC SURGERY

## 2023-03-30 PROCEDURE — 99999 PR PBB SHADOW E&M-EST. PATIENT-LVL III: ICD-10-PCS | Mod: PBBFAC,,, | Performed by: ORTHOPAEDIC SURGERY

## 2023-03-30 PROCEDURE — 73562 X-RAY EXAM OF KNEE 3: CPT | Mod: 26,LT,, | Performed by: RADIOLOGY

## 2023-03-30 PROCEDURE — 73564 X-RAY EXAM KNEE 4 OR MORE: CPT | Mod: 26,RT,, | Performed by: RADIOLOGY

## 2023-03-30 PROCEDURE — 3008F PR BODY MASS INDEX (BMI) DOCUMENTED: ICD-10-PCS | Mod: CPTII,S$GLB,, | Performed by: ORTHOPAEDIC SURGERY

## 2023-03-30 PROCEDURE — 73564 XR KNEE ORTHO RIGHT WITH FLEXION: ICD-10-PCS | Mod: 26,RT,, | Performed by: RADIOLOGY

## 2023-03-30 PROCEDURE — 1160F RVW MEDS BY RX/DR IN RCRD: CPT | Mod: CPTII,S$GLB,, | Performed by: ORTHOPAEDIC SURGERY

## 2023-03-30 PROCEDURE — 99215 PR OFFICE/OUTPT VISIT, EST, LEVL V, 40-54 MIN: ICD-10-PCS | Mod: 57,S$GLB,, | Performed by: ORTHOPAEDIC SURGERY

## 2023-03-30 PROCEDURE — 3008F BODY MASS INDEX DOCD: CPT | Mod: CPTII,S$GLB,, | Performed by: ORTHOPAEDIC SURGERY

## 2023-03-30 PROCEDURE — 73562 XR KNEE ORTHO RIGHT WITH FLEXION: ICD-10-PCS | Mod: 26,LT,, | Performed by: RADIOLOGY

## 2023-03-30 PROCEDURE — 73564 X-RAY EXAM KNEE 4 OR MORE: CPT | Mod: TC,PN,RT

## 2023-03-30 PROCEDURE — 99999 PR PBB SHADOW E&M-EST. PATIENT-LVL III: CPT | Mod: PBBFAC,,, | Performed by: ORTHOPAEDIC SURGERY

## 2023-03-30 NOTE — H&P (VIEW-ONLY)
CC:  52-year-old female presents for evaluation of worsening prepatellar bursitis of her right knee.  We saw her in June of last year and she was noted to have some mild prepatellar bursitis that time.  She continues to exercise and workout pretty actively, about 3 days a week.  Over the last 9 months the bursa has progressively gotten bigger.  It is now bothering her on a pretty regular basis whenever she tries to exercise.  It is an aching pain in the front of her knee and it is swollen quite a bit and has gotten much larger since we last saw her.  She is not really having any pain at rest but with activity she has pain that she rates as a 3 or 4/10.    Past Medical History:   Diagnosis Date    Abnormal Pap smear 2006    once but repeated pap smear and the resulted WNLs.    Abnormal Pap smear of cervix     early 30 's became normal    GERD (gastroesophageal reflux disease)        Past Surgical History:   Procedure Laterality Date    COLONOSCOPY N/A 6/7/2017    Procedure: COLONOSCOPY;  Surgeon: Win Rodriguez MD;  Location: [facility];  Service: Endoscopy;  Laterality: N/A;    HYSTERECTOMY      LUMBAR PARAVERTEBRAL FACET JOINT NERVE BLOCK Bilateral 11/17/2020    Procedure: facet joint injection;  Surgeon: Sina Allen MD;  Location: [facility] OR;  Service: Pain Management;  Laterality: Bilateral;  L4-5, L5-S1       Current Outpatient Medications on File Prior to Visit   Medication Sig Dispense Refill    acyclovir (ZOVIRAX) 400 MG tablet TAKE 1 TABLET(400 MG) BY MOUTH TWICE DAILY 180 tablet 3    doxycycline (VIBRA-TABS) 100 MG tablet Take 1 tablet (100 mg total) by mouth 2 (two) times daily. 20 tablet 0    estradioL (ESTRACE) 1 MG tablet TK 1 T PO QD      fluconazole (DIFLUCAN) 150 MG Tab Take 1 tablet (150 mg total) by mouth once daily. 1 tablet 1    multivitamin capsule Take 1 capsule by mouth once daily.       No current facility-administered medications on file prior to visit.       ROS:    Constitution: Denies  chills, fever, and sweats.  HENT: Denies headaches or blurry vision.  Cardiovascular: Denies chest pain or irregular heart beat.  Respiratory: Denies cough or shortness of breath.  Gastrointestinal: Denies abdominal pain, nausea, or vomiting.  Genitourinary:  Denies urinary incontinence, bladder and kidney issues  Musculoskeletal:  Denies muscle cramps.  Neurological: Denies dizziness or focal weakness.  Psychiatric/Behavioral: Normal mental status.  Hematologic/Lymphatic: Denies bleeding problem or easy bruising/bleeding.  Skin: Denies rash or suspicious lesions.    Physical examination     Gen - No acute distress, well nourished, well groomed   Eyes - Extraoccular motions intact, pupils equally round and reactive to light and accommodation   ENT - normocephalic, atruamtic, oropharynx clear   Neck - Supple, no abnormal masses   Cardiovascular - regular rate and rhythm   Pulmonary - clear to auscultation bilaterally, no wheezes, ronchi, or rales   Abdomen - soft, non-tender, non-distended, positive bowel sounds   Psych - The patient is alert and oriented x3 with normal mood and affect    Examination of the Right Lower Extremity:     Skin intact throughout.  Motor function is intact distally EHL/FHL/TA/armando   +2 dorsalis pedis and posterior tibial pulses   Sensation to light touch intact distally dorsal, plantar, and first web space     Examination of the Right knee:    ROM 0 - 150   Effusion positive  Tenderness to palpation at the joint line positive  Pain during range of motion positive  Crepitation during range of motion negative     positive increased pain noted with flexion past 90   negative antalgic gait noted   negative Lachman's Test   negative Anterior Drawer Test   negative Posterior Drawer Test   negative McMurrays Test   negative Disco Test   negative Varus/Valgus instability    X-ray images were examined and personally interpreted by me.  Three views the right knee dated 03/30/2023 show a very large  soft tissue mass in the area of the prepatellar bursa anterior to the inferior patella and the patellar tendon.    Dx:  Symptomatic prepatellar bursitis right knee that is progressively getting larger.    Plan:  Potential morbidity to the right lower extremity with both operative and non operative treatments were discussed.  Recommendations for excision of the right prepatellar bursa.  Risks and benefits of surgery were explained to the patient.  She verbalized understanding and does wish to proceed.  We will schedule that for the next available date that is convenient for her.

## 2023-03-30 NOTE — PROGRESS NOTES
CC:  52-year-old female presents for evaluation of worsening prepatellar bursitis of her right knee.  We saw her in June of last year and she was noted to have some mild prepatellar bursitis that time.  She continues to exercise and workout pretty actively, about 3 days a week.  Over the last 9 months the bursa has progressively gotten bigger.  It is now bothering her on a pretty regular basis whenever she tries to exercise.  It is an aching pain in the front of her knee and it is swollen quite a bit and has gotten much larger since we last saw her.  She is not really having any pain at rest but with activity she has pain that she rates as a 3 or 4/10.    Past Medical History:   Diagnosis Date    Abnormal Pap smear 2006    once but repeated pap smear and the resulted WNLs.    Abnormal Pap smear of cervix     early 30 's became normal    GERD (gastroesophageal reflux disease)        Past Surgical History:   Procedure Laterality Date    COLONOSCOPY N/A 6/7/2017    Procedure: COLONOSCOPY;  Surgeon: Win Rodriguez MD;  Location: [facility];  Service: Endoscopy;  Laterality: N/A;    HYSTERECTOMY      LUMBAR PARAVERTEBRAL FACET JOINT NERVE BLOCK Bilateral 11/17/2020    Procedure: facet joint injection;  Surgeon: Sina Allen MD;  Location: [facility] OR;  Service: Pain Management;  Laterality: Bilateral;  L4-5, L5-S1       Current Outpatient Medications on File Prior to Visit   Medication Sig Dispense Refill    acyclovir (ZOVIRAX) 400 MG tablet TAKE 1 TABLET(400 MG) BY MOUTH TWICE DAILY 180 tablet 3    doxycycline (VIBRA-TABS) 100 MG tablet Take 1 tablet (100 mg total) by mouth 2 (two) times daily. 20 tablet 0    estradioL (ESTRACE) 1 MG tablet TK 1 T PO QD      fluconazole (DIFLUCAN) 150 MG Tab Take 1 tablet (150 mg total) by mouth once daily. 1 tablet 1    multivitamin capsule Take 1 capsule by mouth once daily.       No current facility-administered medications on file prior to visit.       ROS:    Constitution: Denies  chills, fever, and sweats.  HENT: Denies headaches or blurry vision.  Cardiovascular: Denies chest pain or irregular heart beat.  Respiratory: Denies cough or shortness of breath.  Gastrointestinal: Denies abdominal pain, nausea, or vomiting.  Genitourinary:  Denies urinary incontinence, bladder and kidney issues  Musculoskeletal:  Denies muscle cramps.  Neurological: Denies dizziness or focal weakness.  Psychiatric/Behavioral: Normal mental status.  Hematologic/Lymphatic: Denies bleeding problem or easy bruising/bleeding.  Skin: Denies rash or suspicious lesions.    Physical examination     Gen - No acute distress, well nourished, well groomed   Eyes - Extraoccular motions intact, pupils equally round and reactive to light and accommodation   ENT - normocephalic, atruamtic, oropharynx clear   Neck - Supple, no abnormal masses   Cardiovascular - regular rate and rhythm   Pulmonary - clear to auscultation bilaterally, no wheezes, ronchi, or rales   Abdomen - soft, non-tender, non-distended, positive bowel sounds   Psych - The patient is alert and oriented x3 with normal mood and affect    Examination of the Right Lower Extremity:     Skin intact throughout.  Motor function is intact distally EHL/FHL/TA/armando   +2 dorsalis pedis and posterior tibial pulses   Sensation to light touch intact distally dorsal, plantar, and first web space     Examination of the Right knee:    ROM 0 - 150   Effusion positive  Tenderness to palpation at the joint line positive  Pain during range of motion positive  Crepitation during range of motion negative     positive increased pain noted with flexion past 90   negative antalgic gait noted   negative Lachman's Test   negative Anterior Drawer Test   negative Posterior Drawer Test   negative McMurrays Test   negative Disco Test   negative Varus/Valgus instability    X-ray images were examined and personally interpreted by me.  Three views the right knee dated 03/30/2023 show a very large  soft tissue mass in the area of the prepatellar bursa anterior to the inferior patella and the patellar tendon.    Dx:  Symptomatic prepatellar bursitis right knee that is progressively getting larger.    Plan:  Potential morbidity to the right lower extremity with both operative and non operative treatments were discussed.  Recommendations for excision of the right prepatellar bursa.  Risks and benefits of surgery were explained to the patient.  She verbalized understanding and does wish to proceed.  We will schedule that for the next available date that is convenient for her.

## 2023-03-31 ENCOUNTER — TELEPHONE (OUTPATIENT)
Dept: ORTHOPEDICS | Facility: CLINIC | Age: 53
End: 2023-03-31
Payer: COMMERCIAL

## 2023-03-31 ENCOUNTER — HOSPITAL ENCOUNTER (OUTPATIENT)
Dept: PREADMISSION TESTING | Facility: HOSPITAL | Age: 53
Discharge: HOME | End: 2023-03-31
Payer: COMMERCIAL

## 2023-03-31 NOTE — TELEPHONE ENCOUNTER
Patient r/s preop.     ----- Message from Nannette Strange sent at 3/31/2023  9:39 AM CDT -----  Regarding: please advise  Who Called:ALIDA JACOME [[id number]]         What is the reqeust in detail: Requesting call back to discuss rescheduling appt today for today at 9:45 Preo op. Please advise         Can the clinic reply by [facility]?no         Best Call Back Number:[phone]           Additional Information:

## 2023-03-31 NOTE — DISCHARGE INSTRUCTIONS
To confirm, Your doctor has instructed you that surgery is scheduled for: 4/28/23 with Dr. Garcia    Please report to [facility], [facility] the morning of surgery. You must check-in and receive a wristband before going to your procedure.    Pre-Op will call the afternoon prior to surgery between 1:00 and 6:00 PM with the final arrival time.  Phone number: [phone]    PLEASE NOTE:  The surgery schedule has many variables which may affect the time of your surgery case.  Family members should be available if your surgery time changes.  Plan to be here the day of your procedure between 4-6 hours.    MEDICATIONS:  TAKE ONLY THESE MEDICATIONS WITH A SMALL SIP OF WATER THE MORNING OF YOUR PROCEDURE:      DO NOT TAKE THESE MEDICATIONS 5-7 DAYS PRIOR to your procedure or per your surgeon's request:   ASPIRIN, ALEVE, ADVIL, IBUPROFEN, FISH OIL VITAMIN E, HERBALS  (May take Tylenol)    ONLY if you are prescribed any types of blood thinners such as:  Aspirin, Coumadin, Plavix, Pradaxa, Xarelto, Aggrenox, Effient, Eliquis, Savasya, Brilinta, or any other, ask your surgeon whether you should stop taking them and how long before surgery you should stop.  You may also need to verify with the prescribing physician if it is ok to stop your medication.      INSTRUCTIONS IMPORTANT!!  Do not eat or drink anything between midnight and the time of your procedure- this includes gum, mints, and candy.  Do not smoke or drink alcoholic beverages 24 hours prior to your procedure.  Shower the night before AND the morning of your procedure with a Chlorhexidine wash such as Hibiclens or Dial antibacterial soap from the neck down.  Do not get it on your face or in your eyes.  You may use your own shampoo and face wash. This helps your skin to be as bacteria free as possible.    If you wear contact lenses, dentures, hearing aids or glasses, bring a container to put them in during surgery and give to a family member for  safe keeping.  Please leave all jewelry, piercing's and valuables at home.   DO NOT remove hair from the surgery site.  Do not shave the incision site unless you are given specific instructions to do so.    ONLY if you have been diagnosed with sleep apnea please bring your C-PAP machine.  ONLY if you wear home oxygen please bring your portable oxygen tank the day of your procedure.  ONLY if you have a history of OPEN HEART SURGERY you will need a clearance from your Cardiologist per Anesthesia.      ONLY for patients requiring bowel prep, written instructions will be given by your doctor's office.  ONLY if you have a neuro stimulator, please bring the controller with you the morning of surgery  ONLY if a type and screen test is needed before surgery, please return:  If your doctor has scheduled you for an overnight stay, bring a small overnight bag with any personal items you need.  Make arrangements in advance for transportation home by a responsible adult.  It is not safe to drive a vehicle during the 24 hours after anesthesia.      [facility] Visitor Policy    Effective September 26, 2022    [facility] will resume routine visitation for COVID-19 negative patients, including inpatients, outpatients, and procedural areas, in accordance with local [place] procedures.    All [facility] facilities and properties are tobacco free.  Smoking is NOT allowed.   If you have any questions about these instructions, call Pre-Op Admit  Nursing at [phone] or the Pre-Op Day Surgery Unit at [phone].

## 2023-04-04 ENCOUNTER — HOSPITAL ENCOUNTER (OUTPATIENT)
Dept: PREADMISSION TESTING | Facility: HOSPITAL | Age: 53
Discharge: HOME | End: 2023-04-04
Attending: ORTHOPAEDIC SURGERY
Payer: COMMERCIAL

## 2023-04-04 VITALS — BODY MASS INDEX: 24.43 KG/M2 | HEIGHT: 66 IN | WEIGHT: 152 LBS

## 2023-04-04 DIAGNOSIS — M70.41 PREPATELLAR BURSITIS OF RIGHT KNEE: ICD-10-CM

## 2023-04-04 DIAGNOSIS — Z01.818 PREOP TESTING: ICD-10-CM

## 2023-04-04 LAB
ANION GAP SERPL CALC-SCNC: 7 MMOL/L (ref 8–16)
BASOPHILS # BLD AUTO: 0.01 K/UL (ref 0–0.2)
BASOPHILS NFR BLD: 0.2 % (ref 0–1.9)
BUN SERPL-MCNC: 16 MG/DL (ref 6–20)
CALCIUM SERPL-MCNC: 9.7 MG/DL (ref 8.7–10.5)
CHLORIDE SERPL-SCNC: 104 MMOL/L (ref 95–110)
CO2 SERPL-SCNC: 26 MMOL/L (ref 23–29)
CREAT SERPL-MCNC: 0.9 MG/DL (ref 0.5–1.4)
DIFFERENTIAL METHOD: ABNORMAL
EOSINOPHIL # BLD AUTO: 0.1 K/UL (ref 0–0.5)
EOSINOPHIL NFR BLD: 2.2 % (ref 0–8)
ERYTHROCYTE [DISTWIDTH] IN BLOOD BY AUTOMATED COUNT: 13.2 % (ref 11.5–14.5)
EST. GFR  (NO RACE VARIABLE): >60 ML/MIN/1.73 M^2
GLUCOSE SERPL-MCNC: 91 MG/DL (ref 70–110)
HCT VFR BLD AUTO: 39.4 % (ref 37–48.5)
HGB BLD-MCNC: 12.3 G/DL (ref 12–16)
IMM GRANULOCYTES # BLD AUTO: 0.01 K/UL (ref 0–0.04)
IMM GRANULOCYTES NFR BLD AUTO: 0.2 % (ref 0–0.5)
LYMPHOCYTES # BLD AUTO: 2.2 K/UL (ref 1–4.8)
LYMPHOCYTES NFR BLD: 43.9 % (ref 18–48)
MCH RBC QN AUTO: 26.5 PG (ref 27–31)
MCHC RBC AUTO-ENTMCNC: 31.2 G/DL (ref 32–36)
MCV RBC AUTO: 85 FL (ref 82–98)
MONOCYTES # BLD AUTO: 0.4 K/UL (ref 0.3–1)
MONOCYTES NFR BLD: 8.8 % (ref 4–15)
NEUTROPHILS # BLD AUTO: 2.2 K/UL (ref 1.8–7.7)
NEUTROPHILS NFR BLD: 44.7 % (ref 38–73)
NRBC BLD-RTO: 0 /100 WBC
PLATELET # BLD AUTO: 215 K/UL (ref 150–450)
PMV BLD AUTO: 9.7 FL (ref 9.2–12.9)
POTASSIUM SERPL-SCNC: 4 MMOL/L (ref 3.5–5.1)
RBC # BLD AUTO: 4.65 M/UL (ref 4–5.4)
SODIUM SERPL-SCNC: 137 MMOL/L (ref 136–145)
WBC # BLD AUTO: 5.01 K/UL (ref 3.9–12.7)

## 2023-04-04 PROCEDURE — 85025 COMPLETE CBC W/AUTO DIFF WBC: CPT | Performed by: ORTHOPAEDIC SURGERY

## 2023-04-04 PROCEDURE — 99900104 DSU ONLY-NO CHARGE-EA ADD'L HR (STAT)

## 2023-04-04 PROCEDURE — 36415 COLL VENOUS BLD VENIPUNCTURE: CPT | Performed by: ORTHOPAEDIC SURGERY

## 2023-04-04 PROCEDURE — 93010 ELECTROCARDIOGRAM REPORT: CPT | Mod: ,,, | Performed by: INTERNAL MEDICINE

## 2023-04-04 PROCEDURE — 93010 EKG 12-LEAD: ICD-10-PCS | Mod: ,,, | Performed by: INTERNAL MEDICINE

## 2023-04-04 PROCEDURE — 93005 ELECTROCARDIOGRAM TRACING: CPT

## 2023-04-04 PROCEDURE — 80048 BASIC METABOLIC PNL TOTAL CA: CPT | Performed by: ORTHOPAEDIC SURGERY

## 2023-04-04 PROCEDURE — 99900103 DSU ONLY-NO CHARGE-INITIAL HR (STAT)

## 2023-04-04 NOTE — DISCHARGE INSTRUCTIONS
To confirm, Your doctor has instructed you that surgery is scheduled for: 4/28/23    Please report to [facility], Registration the morning of surgery. You must check-in and receive a wristband before going to your procedure.    Pre-Op will call the afternoon prior to surgery between 1:00 and 6:00 PM with the final arrival time.  Phone number: [phone]    PLEASE NOTE:  The surgery schedule has many variables which may affect the time of your surgery case.  Family members should be available if your surgery time changes.  Plan to be here the day of your procedure between 4-6 hours.    MEDICATIONS:  TAKE ONLY THESE MEDICATIONS WITH A SMALL SIP OF WATER THE MORNING OF YOUR PROCEDURE:    See List    DO NOT TAKE THESE MEDICATIONS 5-7 DAYS PRIOR to your procedure or per your surgeon's request:   ASPIRIN, ALEVE, ADVIL, IBUPROFEN, FISH OIL VITAMIN E, HERBALS  (May take Tylenol)    ONLY if you are prescribed any types of blood thinners such as:  Aspirin, Coumadin, Plavix, Pradaxa, Xarelto, Aggrenox, Effient, Eliquis, Savasya, Brilinta, or any other, ask your surgeon whether you should stop taking them and how long before surgery you should stop.  You may also need to verify with the prescribing physician if it is ok to stop your medication.      INSTRUCTIONS IMPORTANT!!  Do not eat or drink anything between midnight and the time of your procedure- this includes gum, mints, and candy.  Do not smoke or drink alcoholic beverages 24 hours prior to your procedure.  Shower the night before AND the morning of your procedure with a Chlorhexidine wash such as Hibiclens or Dial antibacterial soap from the neck down.  Do not get it on your face or in your eyes.  You may use your own shampoo and face wash. This helps your skin to be as bacteria free as possible.    If you wear contact lenses, dentures, hearing aids or glasses, bring a container to put them in during surgery and give to a family member for safe  keeping.  Please leave all jewelry, piercing's and valuables at home.   DO NOT remove hair from the surgery site.  Do not shave the incision site unless you are given specific instructions to do so.    ONLY if you have been diagnosed with sleep apnea please bring your C-PAP machine.  ONLY if you wear home oxygen please bring your portable oxygen tank the day of your procedure.  ONLY if you have a history of OPEN HEART SURGERY you will need a clearance from your Cardiologist per Anesthesia.      ONLY for patients requiring bowel prep, written instructions will be given by your doctor's office.  ONLY if you have a neuro stimulator, please bring the controller with you the morning of surgery  ONLY if a type and screen test is needed before surgery, please return:  If your doctor has scheduled you for an overnight stay, bring a small overnight bag with any personal items you need.  Make arrangements in advance for transportation home by a responsible adult.  It is not safe to drive a vehicle during the 24 hours after anesthesia.      [facility] Visitor Policy    Effective September 26, 2022    [facility] will resume routine visitation for COVID-19 negative patients, including inpatients, outpatients, and procedural areas, in accordance with local [place] procedures.    All [facility] facilities and properties are tobacco free.  Smoking is NOT allowed.   If you have any questions about these instructions, call Pre-Op Admit  Nursing at [phone] or the Pre-Op Day Surgery Unit at [phone].

## 2023-04-19 ENCOUNTER — OFFICE VISIT (OUTPATIENT)
Dept: FAMILY MEDICINE | Facility: CLINIC | Age: 53
End: 2023-04-19
Payer: COMMERCIAL

## 2023-04-19 VITALS
OXYGEN SATURATION: 98 % | TEMPERATURE: 98 F | BODY MASS INDEX: 25.37 KG/M2 | WEIGHT: 157.88 LBS | HEIGHT: 66 IN | DIASTOLIC BLOOD PRESSURE: 72 MMHG | SYSTOLIC BLOOD PRESSURE: 130 MMHG | HEART RATE: 88 BPM

## 2023-04-19 DIAGNOSIS — B96.89 BACTERIAL RESPIRATORY INFECTION: Primary | ICD-10-CM

## 2023-04-19 DIAGNOSIS — R05.9 COUGH, UNSPECIFIED TYPE: ICD-10-CM

## 2023-04-19 DIAGNOSIS — J98.8 BACTERIAL RESPIRATORY INFECTION: Primary | ICD-10-CM

## 2023-04-19 PROCEDURE — 99999 PR PBB SHADOW E&M-EST. PATIENT-LVL IV: ICD-10-PCS | Mod: PBBFAC,,, | Performed by: NURSE PRACTITIONER

## 2023-04-19 PROCEDURE — 94640 PR INHAL RX, AIRWAY OBST/DX SPUTUM INDUCT: ICD-10-PCS | Mod: S$GLB,,, | Performed by: NURSE PRACTITIONER

## 2023-04-19 PROCEDURE — 3078F PR MOST RECENT DIASTOLIC BLOOD PRESSURE < 80 MM HG: ICD-10-PCS | Mod: CPTII,S$GLB,, | Performed by: NURSE PRACTITIONER

## 2023-04-19 PROCEDURE — 99999 PR PBB SHADOW E&M-EST. PATIENT-LVL IV: CPT | Mod: PBBFAC,,, | Performed by: NURSE PRACTITIONER

## 2023-04-19 PROCEDURE — 99213 OFFICE O/P EST LOW 20 MIN: CPT | Mod: 25,S$GLB,, | Performed by: NURSE PRACTITIONER

## 2023-04-19 PROCEDURE — 94640 AIRWAY INHALATION TREATMENT: CPT | Mod: S$GLB,,, | Performed by: NURSE PRACTITIONER

## 2023-04-19 PROCEDURE — 1159F PR MEDICATION LIST DOCUMENTED IN MEDICAL RECORD: ICD-10-PCS | Mod: CPTII,S$GLB,, | Performed by: NURSE PRACTITIONER

## 2023-04-19 PROCEDURE — 1160F RVW MEDS BY RX/DR IN RCRD: CPT | Mod: CPTII,S$GLB,, | Performed by: NURSE PRACTITIONER

## 2023-04-19 PROCEDURE — 1159F MED LIST DOCD IN RCRD: CPT | Mod: CPTII,S$GLB,, | Performed by: NURSE PRACTITIONER

## 2023-04-19 PROCEDURE — 3008F PR BODY MASS INDEX (BMI) DOCUMENTED: ICD-10-PCS | Mod: CPTII,S$GLB,, | Performed by: NURSE PRACTITIONER

## 2023-04-19 PROCEDURE — 3075F PR MOST RECENT SYSTOLIC BLOOD PRESS GE 130-139MM HG: ICD-10-PCS | Mod: CPTII,S$GLB,, | Performed by: NURSE PRACTITIONER

## 2023-04-19 PROCEDURE — 99213 PR OFFICE/OUTPT VISIT, EST, LEVL III, 20-29 MIN: ICD-10-PCS | Mod: 25,S$GLB,, | Performed by: NURSE PRACTITIONER

## 2023-04-19 PROCEDURE — 3078F DIAST BP <80 MM HG: CPT | Mod: CPTII,S$GLB,, | Performed by: NURSE PRACTITIONER

## 2023-04-19 PROCEDURE — 96372 THER/PROPH/DIAG INJ SC/IM: CPT | Mod: 59,S$GLB,, | Performed by: NURSE PRACTITIONER

## 2023-04-19 PROCEDURE — 96372 PR INJECTION,THERAP/PROPH/DIAG2ST, IM OR SUBCUT: ICD-10-PCS | Mod: 59,S$GLB,, | Performed by: NURSE PRACTITIONER

## 2023-04-19 PROCEDURE — 3075F SYST BP GE 130 - 139MM HG: CPT | Mod: CPTII,S$GLB,, | Performed by: NURSE PRACTITIONER

## 2023-04-19 PROCEDURE — 3008F BODY MASS INDEX DOCD: CPT | Mod: CPTII,S$GLB,, | Performed by: NURSE PRACTITIONER

## 2023-04-19 PROCEDURE — 1160F PR REVIEW ALL MEDS BY PRESCRIBER/CLIN PHARMACIST DOCUMENTED: ICD-10-PCS | Mod: CPTII,S$GLB,, | Performed by: NURSE PRACTITIONER

## 2023-04-19 RX ORDER — DEXAMETHASONE SODIUM PHOSPHATE 4 MG/ML
4 INJECTION, SOLUTION INTRA-ARTICULAR; INTRALESIONAL; INTRAMUSCULAR; INTRAVENOUS; SOFT TISSUE
Status: COMPLETED | OUTPATIENT
Start: 2023-04-19 | End: 2023-04-19

## 2023-04-19 RX ORDER — IPRATROPIUM BROMIDE AND ALBUTEROL SULFATE 2.5; .5 MG/3ML; MG/3ML
3 SOLUTION RESPIRATORY (INHALATION)
Status: COMPLETED | OUTPATIENT
Start: 2023-04-19 | End: 2023-04-19

## 2023-04-19 RX ORDER — METHYLPREDNISOLONE 4 MG/1
TABLET ORAL
Qty: 1 EACH | Refills: 0 | Status: SHIPPED | OUTPATIENT
Start: 2023-04-19 | End: 2023-04-27

## 2023-04-19 RX ORDER — BENZONATATE 100 MG/1
100 CAPSULE ORAL 3 TIMES DAILY PRN
Qty: 30 CAPSULE | Refills: 1 | Status: SHIPPED | OUTPATIENT
Start: 2023-04-19 | End: 2023-04-29

## 2023-04-19 RX ORDER — AZITHROMYCIN 250 MG/1
TABLET, FILM COATED ORAL
Qty: 6 TABLET | Refills: 0 | Status: SHIPPED | OUTPATIENT
Start: 2023-04-19 | End: 2023-04-24

## 2023-04-19 RX ORDER — PROMETHAZINE HYDROCHLORIDE AND DEXTROMETHORPHAN HYDROBROMIDE 6.25; 15 MG/5ML; MG/5ML
5 SYRUP ORAL NIGHTLY PRN
Qty: 118 ML | Refills: 1 | Status: SHIPPED | OUTPATIENT
Start: 2023-04-19 | End: 2023-04-29

## 2023-04-19 RX ADMIN — IPRATROPIUM BROMIDE AND ALBUTEROL SULFATE 3 ML: 2.5; .5 SOLUTION RESPIRATORY (INHALATION) at 02:04

## 2023-04-19 RX ADMIN — DEXAMETHASONE SODIUM PHOSPHATE 4 MG: 4 INJECTION, SOLUTION INTRA-ARTICULAR; INTRALESIONAL; INTRAMUSCULAR; INTRAVENOUS; SOFT TISSUE at 02:04

## 2023-04-19 NOTE — PROGRESS NOTES
Subjective:       Patient ID: Dayo Cortes is a 52 y.o. female.    Chief Complaint: Cough    Cough  This is a new problem. The current episode started 1 to 4 weeks ago. The cough is Non-productive. Associated symptoms include headaches. Pertinent negatives include no fever or postnasal drip. The symptoms are aggravated by lying down.     Past Medical History:   Diagnosis Date    Abnormal Pap smear 2006    once but repeated pap smear and the resulted WNLs.    Abnormal Pap smear of cervix     early 30 's became normal    GERD (gastroesophageal reflux disease)        Review of patient's allergies indicates:  No Known Allergies      Current Outpatient Medications:     acyclovir (ZOVIRAX) 400 MG tablet, TAKE 1 TABLET(400 MG) BY MOUTH TWICE DAILY, Disp: 180 tablet, Rfl: 3    estradioL (ESTRACE) 1 MG tablet, TK 1 T PO QD, Disp: , Rfl:     multivitamin capsule, Take 1 capsule by mouth once daily., Disp: , Rfl:     azithromycin (Z-ABEL) 250 MG tablet, Take 2 tablets by mouth on day 1; Take 1 tablet by mouth on days 2-5, Disp: 6 tablet, Rfl: 0    benzonatate (TESSALON) 100 MG capsule, Take 1 capsule (100 mg total) by mouth 3 (three) times daily as needed for Cough., Disp: 30 capsule, Rfl: 1    fluconazole (DIFLUCAN) 150 MG Tab, Take 1 tablet (150 mg total) by mouth once daily. (Patient not taking: Reported on 4/19/2023), Disp: 1 tablet, Rfl: 1    methylPREDNISolone (MEDROL DOSEPACK) 4 mg tablet, use as directed, Disp: 1 each, Rfl: 0    promethazine-dextromethorphan (PROMETHAZINE-DM) 6.25-15 mg/5 mL Syrp, Take 5 mLs by mouth nightly as needed (cough)., Disp: 118 mL, Rfl: 1  No current facility-administered medications for this visit.    Review of Systems   Constitutional:  Positive for fatigue. Negative for fever.   HENT:  Positive for voice change. Negative for postnasal drip and trouble swallowing.    Respiratory:  Positive for cough.    Neurological:  Positive for headaches.     Objective:      /72 (BP Location:  "Right arm, Patient Position: Sitting, BP Method: Small (Manual))   Pulse 88   Temp 98.4 °F (36.9 °C) (Oral)   Ht 5' 6" (1.676 m)   Wt 71.6 kg (157 lb 13.6 oz)   LMP 03/12/2019 (Exact Date)   SpO2 98%   BMI 25.48 kg/m²   Physical Exam  Constitutional:       Appearance: She is well-developed.   Eyes:      Pupils: Pupils are equal, round, and reactive to light.   Cardiovascular:      Rate and Rhythm: Normal rate and regular rhythm.      Heart sounds: Normal heart sounds.   Pulmonary:      Effort: Pulmonary effort is normal.      Breath sounds: Wheezing present.   Abdominal:      General: Bowel sounds are normal.      Palpations: Abdomen is soft.   Musculoskeletal:         General: Normal range of motion.      Cervical back: Normal range of motion.   Skin:     General: Skin is warm and dry.   Neurological:      Mental Status: She is alert and oriented to person, place, and time.   Psychiatric:         Behavior: Behavior normal.         Thought Content: Thought content normal.         Judgment: Judgment normal.       Assessment:       1. Bacterial respiratory infection    2. Cough, unspecified type        Plan:       Bacterial respiratory infection  -     dexAMETHasone injection 4 mg  -     albuterol-ipratropium 2.5 mg-0.5 mg/3 mL nebulizer solution 3 mL  -     azithromycin (Z-ABEL) 250 MG tablet; Take 2 tablets by mouth on day 1; Take 1 tablet by mouth on days 2-5  Dispense: 6 tablet; Refill: 0  -     methylPREDNISolone (MEDROL DOSEPACK) 4 mg tablet; use as directed  Dispense: 1 each; Refill: 0  -     benzonatate (TESSALON) 100 MG capsule; Take 1 capsule (100 mg total) by mouth 3 (three) times daily as needed for Cough.  Dispense: 30 capsule; Refill: 1  -     promethazine-dextromethorphan (PROMETHAZINE-DM) 6.25-15 mg/5 mL Syrp; Take 5 mLs by mouth nightly as needed (cough).  Dispense: 118 mL; Refill: 1    Cough, unspecified type  -     dexAMETHasone injection 4 mg  -     albuterol-ipratropium 2.5 mg-0.5 mg/3 mL " nebulizer solution 3 mL  -     azithromycin (Z-ABEL) 250 MG tablet; Take 2 tablets by mouth on day 1; Take 1 tablet by mouth on days 2-5  Dispense: 6 tablet; Refill: 0  -     methylPREDNISolone (MEDROL DOSEPACK) 4 mg tablet; use as directed  Dispense: 1 each; Refill: 0  -     benzonatate (TESSALON) 100 MG capsule; Take 1 capsule (100 mg total) by mouth 3 (three) times daily as needed for Cough.  Dispense: 30 capsule; Refill: 1  -     promethazine-dextromethorphan (PROMETHAZINE-DM) 6.25-15 mg/5 mL Syrp; Take 5 mLs by mouth nightly as needed (cough).  Dispense: 118 mL; Refill: 1      Time spent with patient: 20 minutes    Patient with be reevaluated in 3 months or sooner prn    Greater than 50% of this visit was spent counseling as described in above documentation:Yes

## 2023-04-24 ENCOUNTER — ANESTHESIA EVENT (OUTPATIENT)
Dept: SURGERY | Facility: HOSPITAL | Age: 53
End: 2023-04-24
Payer: COMMERCIAL

## 2023-04-27 ENCOUNTER — TELEPHONE (OUTPATIENT)
Dept: FAMILY MEDICINE | Facility: CLINIC | Age: 53
End: 2023-04-27
Payer: COMMERCIAL

## 2023-04-27 ENCOUNTER — OFFICE VISIT (OUTPATIENT)
Dept: FAMILY MEDICINE | Facility: CLINIC | Age: 53
End: 2023-04-27
Payer: COMMERCIAL

## 2023-04-27 VITALS
HEIGHT: 66 IN | DIASTOLIC BLOOD PRESSURE: 68 MMHG | HEART RATE: 79 BPM | TEMPERATURE: 98 F | RESPIRATION RATE: 16 BRPM | WEIGHT: 158.94 LBS | BODY MASS INDEX: 25.54 KG/M2 | OXYGEN SATURATION: 98 % | SYSTOLIC BLOOD PRESSURE: 122 MMHG

## 2023-04-27 DIAGNOSIS — R05.9 COUGH, UNSPECIFIED TYPE: Primary | ICD-10-CM

## 2023-04-27 PROCEDURE — 1160F RVW MEDS BY RX/DR IN RCRD: CPT | Mod: CPTII,S$GLB,, | Performed by: FAMILY MEDICINE

## 2023-04-27 PROCEDURE — 1159F MED LIST DOCD IN RCRD: CPT | Mod: CPTII,S$GLB,, | Performed by: FAMILY MEDICINE

## 2023-04-27 PROCEDURE — 99213 OFFICE O/P EST LOW 20 MIN: CPT | Mod: S$GLB,,, | Performed by: FAMILY MEDICINE

## 2023-04-27 PROCEDURE — 3078F PR MOST RECENT DIASTOLIC BLOOD PRESSURE < 80 MM HG: ICD-10-PCS | Mod: CPTII,S$GLB,, | Performed by: FAMILY MEDICINE

## 2023-04-27 PROCEDURE — 3008F BODY MASS INDEX DOCD: CPT | Mod: CPTII,S$GLB,, | Performed by: FAMILY MEDICINE

## 2023-04-27 PROCEDURE — 3078F DIAST BP <80 MM HG: CPT | Mod: CPTII,S$GLB,, | Performed by: FAMILY MEDICINE

## 2023-04-27 PROCEDURE — 3008F PR BODY MASS INDEX (BMI) DOCUMENTED: ICD-10-PCS | Mod: CPTII,S$GLB,, | Performed by: FAMILY MEDICINE

## 2023-04-27 PROCEDURE — 3074F SYST BP LT 130 MM HG: CPT | Mod: CPTII,S$GLB,, | Performed by: FAMILY MEDICINE

## 2023-04-27 PROCEDURE — 1160F PR REVIEW ALL MEDS BY PRESCRIBER/CLIN PHARMACIST DOCUMENTED: ICD-10-PCS | Mod: CPTII,S$GLB,, | Performed by: FAMILY MEDICINE

## 2023-04-27 PROCEDURE — 3074F PR MOST RECENT SYSTOLIC BLOOD PRESSURE < 130 MM HG: ICD-10-PCS | Mod: CPTII,S$GLB,, | Performed by: FAMILY MEDICINE

## 2023-04-27 PROCEDURE — 99999 PR PBB SHADOW E&M-EST. PATIENT-LVL IV: ICD-10-PCS | Mod: PBBFAC,,, | Performed by: FAMILY MEDICINE

## 2023-04-27 PROCEDURE — 1159F PR MEDICATION LIST DOCUMENTED IN MEDICAL RECORD: ICD-10-PCS | Mod: CPTII,S$GLB,, | Performed by: FAMILY MEDICINE

## 2023-04-27 PROCEDURE — 99213 PR OFFICE/OUTPT VISIT, EST, LEVL III, 20-29 MIN: ICD-10-PCS | Mod: S$GLB,,, | Performed by: FAMILY MEDICINE

## 2023-04-27 PROCEDURE — 99999 PR PBB SHADOW E&M-EST. PATIENT-LVL IV: CPT | Mod: PBBFAC,,, | Performed by: FAMILY MEDICINE

## 2023-04-27 RX ORDER — ONDANSETRON 4 MG/1
4 TABLET, FILM COATED ORAL 2 TIMES DAILY
Qty: 30 TABLET | Refills: 0 | Status: SHIPPED | OUTPATIENT
Start: 2023-04-27 | End: 2023-08-08 | Stop reason: ALTCHOICE

## 2023-04-27 RX ORDER — HYDROCODONE BITARTRATE AND ACETAMINOPHEN 7.5; 325 MG/1; MG/1
1 TABLET ORAL EVERY 6 HOURS PRN
Qty: 28 TABLET | Refills: 0 | Status: SHIPPED | OUTPATIENT
Start: 2023-04-27 | End: 2023-08-08 | Stop reason: ALTCHOICE

## 2023-04-27 NOTE — PROGRESS NOTES
Subjective:       Patient ID: Dayo Cortes is a 52 y.o. female.    Chief Complaint: No chief complaint on file.    New to me patient here for UC visit.  Concern is that she has surgery planned for tomorrow and was RX 8 days ago for aucte URI. Took a Zpak and MEdrol DP.  Improved in all aspects but still feels chest is not fully free/clear when breathing.  Minimal cough with a little clear sputum.  No wheezing or pl pain or fever.  No calf pain or leg swelling.  She is very fit and exercises regularly; does feel more fatigue than normal after a workout yesterday.  Review of Systems   Constitutional:  Positive for fatigue. Negative for fever.   Respiratory:  Positive for cough. Negative for shortness of breath.    Cardiovascular:  Negative for chest pain.   Gastrointestinal:  Negative for abdominal pain and nausea.   Skin:  Negative for rash.   All other systems reviewed and are negative.    Objective:      Physical Exam  Vitals reviewed.   Constitutional:       General: She is not in acute distress.     Appearance: Normal appearance. She is well-developed. She is not ill-appearing.   HENT:      Right Ear: Tympanic membrane normal.      Left Ear: Tympanic membrane normal.      Nose: Nose normal.      Mouth/Throat:      Mouth: Mucous membranes are moist.      Pharynx: Oropharynx is clear. No posterior oropharyngeal erythema.   Cardiovascular:      Rate and Rhythm: Normal rate and regular rhythm.      Heart sounds: No murmur heard.  Pulmonary:      Effort: Pulmonary effort is normal.      Breath sounds: Normal breath sounds. No wheezing or rales.      Comments: 98% Sat  Neurological:      Mental Status: She is alert.       Assessment:       1. Cough, unspecified type        Plan:       Cough, unspecified type      Patient Instructions   Push fluids intake.  Drink plenty of water.     Contact me or your PCP if any worsening or for any new concerns as we discussed.

## 2023-04-27 NOTE — TELEPHONE ENCOUNTER
Triaged patient r/t scheduled UC visit: Patient states she is having a versa removed from her knee tomorrow & wants to make sure she is ok to go under anaesthesia. She is experiencing congestion & some tightness in her lungs on inspiration. Was treated previously by Mrs. Ortega, ROGELIO w/steroid, ABX, & neb. She then went on vacation & since returning doesn't feel as her symptoms have resolved. More of a respiratory problem than cardiac. Dr. Phillips made aware of same.

## 2023-04-27 NOTE — ANESTHESIA PREPROCEDURE EVALUATION
04/27/2023  Dayo Cortes is a 52 y.o., female.      Pre-op Assessment    I have reviewed the NPO Status.   I have reviewed the Medications.     Review of Systems  Anesthesia Hx:  No problems with previous Anesthesia    Social:  Non-Smoker    Cardiovascular:   Exercise tolerance: good    Pulmonary:  Pulmonary Normal    Renal/:  Renal/ Normal     Hepatic/GI:   GERD    Musculoskeletal:   Arthritis     Endocrine:  Endocrine Normal        Physical Exam  General: Well nourished    Airway:  Mallampati: II   Mouth Opening: Normal  TM Distance: Normal  Tongue: Normal  Neck ROM: Normal ROM    Dental:  Intact    Chest/Lungs:  Clear to auscultation, Normal Respiratory Rate        Anesthesia Plan  Type of Anesthesia, risks & benefits discussed:    Anesthesia Type: Gen Supraglottic Airway  Intra-op Monitoring Plan: Standard ASA Monitors  Post Op Pain Control Plan: multimodal analgesia and IV/PO Opioids PRN  Induction:  IV  Airway Plan: Direct  Informed Consent: Informed consent signed with the Patient and all parties understand the risks and agree with anesthesia plan.  All questions answered. Patient consented to blood products? No  ASA Score: 2    Ready For Surgery From Anesthesia Perspective.     .

## 2023-04-28 ENCOUNTER — PATIENT MESSAGE (OUTPATIENT)
Dept: SURGERY | Facility: HOSPITAL | Age: 53
End: 2023-04-28
Payer: COMMERCIAL

## 2023-04-28 ENCOUNTER — HOSPITAL ENCOUNTER (OUTPATIENT)
Facility: HOSPITAL | Age: 53
Discharge: HOME | End: 2023-04-28
Attending: ORTHOPAEDIC SURGERY | Admitting: ORTHOPAEDIC SURGERY
Payer: COMMERCIAL

## 2023-04-28 ENCOUNTER — ANESTHESIA (OUTPATIENT)
Dept: SURGERY | Facility: HOSPITAL | Age: 53
End: 2023-04-28
Payer: COMMERCIAL

## 2023-04-28 DIAGNOSIS — Z01.818 PREOP TESTING: ICD-10-CM

## 2023-04-28 DIAGNOSIS — M70.41 PREPATELLAR BURSITIS OF RIGHT KNEE: Primary | ICD-10-CM

## 2023-04-28 PROCEDURE — 99900104 DSU ONLY-NO CHARGE-EA ADD'L HR (STAT): Performed by: ORTHOPAEDIC SURGERY

## 2023-04-28 PROCEDURE — 63600175 PHARM REV CODE 636 W HCPCS: Performed by: ANESTHESIOLOGY

## 2023-04-28 PROCEDURE — D9220A PRA ANESTHESIA: ICD-10-PCS | Mod: CRNA,,, | Performed by: NURSE ANESTHETIST, CERTIFIED REGISTERED

## 2023-04-28 PROCEDURE — 36000708 HC OR TIME LEV III 1ST 15 MIN: Performed by: ORTHOPAEDIC SURGERY

## 2023-04-28 PROCEDURE — 25000003 PHARM REV CODE 250: Performed by: NURSE ANESTHETIST, CERTIFIED REGISTERED

## 2023-04-28 PROCEDURE — 88304 TISSUE EXAM BY PATHOLOGIST: CPT | Mod: 26,,, | Performed by: STUDENT IN AN ORGANIZED HEALTH CARE EDUCATION/TRAINING PROGRAM

## 2023-04-28 PROCEDURE — 63600175 PHARM REV CODE 636 W HCPCS: Performed by: NURSE ANESTHETIST, CERTIFIED REGISTERED

## 2023-04-28 PROCEDURE — 88304 PR  SURG PATH,LEVEL III: ICD-10-PCS | Mod: 26,,, | Performed by: STUDENT IN AN ORGANIZED HEALTH CARE EDUCATION/TRAINING PROGRAM

## 2023-04-28 PROCEDURE — 94799 UNLISTED PULMONARY SVC/PX: CPT

## 2023-04-28 PROCEDURE — 63600175 PHARM REV CODE 636 W HCPCS: Performed by: ORTHOPAEDIC SURGERY

## 2023-04-28 PROCEDURE — D9220A PRA ANESTHESIA: Mod: ANES,,, | Performed by: ANESTHESIOLOGY

## 2023-04-28 PROCEDURE — 99900103 DSU ONLY-NO CHARGE-INITIAL HR (STAT): Performed by: ORTHOPAEDIC SURGERY

## 2023-04-28 PROCEDURE — 36000709 HC OR TIME LEV III EA ADD 15 MIN: Performed by: ORTHOPAEDIC SURGERY

## 2023-04-28 PROCEDURE — 71000015 HC POSTOP RECOV 1ST HR: Performed by: ORTHOPAEDIC SURGERY

## 2023-04-28 PROCEDURE — 25000003 PHARM REV CODE 250: Performed by: ANESTHESIOLOGY

## 2023-04-28 PROCEDURE — 27200651 HC AIRWAY, LMA: Performed by: ANESTHESIOLOGY

## 2023-04-28 PROCEDURE — 27340 PR REMOVAL PREPATELLA BURSA: ICD-10-PCS | Mod: RT,,, | Performed by: ORTHOPAEDIC SURGERY

## 2023-04-28 PROCEDURE — 88304 TISSUE EXAM BY PATHOLOGIST: CPT | Performed by: STUDENT IN AN ORGANIZED HEALTH CARE EDUCATION/TRAINING PROGRAM

## 2023-04-28 PROCEDURE — 71000039 HC RECOVERY, EACH ADD'L HOUR: Performed by: ORTHOPAEDIC SURGERY

## 2023-04-28 PROCEDURE — 71000033 HC RECOVERY, INTIAL HOUR: Performed by: ORTHOPAEDIC SURGERY

## 2023-04-28 PROCEDURE — D9220A PRA ANESTHESIA: ICD-10-PCS | Mod: ANES,,, | Performed by: ANESTHESIOLOGY

## 2023-04-28 PROCEDURE — 37000008 HC ANESTHESIA 1ST 15 MINUTES: Performed by: ORTHOPAEDIC SURGERY

## 2023-04-28 PROCEDURE — 37000009 HC ANESTHESIA EA ADD 15 MINS: Performed by: ORTHOPAEDIC SURGERY

## 2023-04-28 PROCEDURE — D9220A PRA ANESTHESIA: Mod: CRNA,,, | Performed by: NURSE ANESTHETIST, CERTIFIED REGISTERED

## 2023-04-28 PROCEDURE — 27340 REMOVAL OF KNEECAP BURSA: CPT | Mod: RT,,, | Performed by: ORTHOPAEDIC SURGERY

## 2023-04-28 PROCEDURE — 25000003 PHARM REV CODE 250: Performed by: ORTHOPAEDIC SURGERY

## 2023-04-28 RX ORDER — METOCLOPRAMIDE HYDROCHLORIDE 5 MG/ML
10 INJECTION INTRAMUSCULAR; INTRAVENOUS EVERY 10 MIN PRN
Status: DISCONTINUED | OUTPATIENT
Start: 2023-04-28 | End: 2023-04-28 | Stop reason: HOSPADM

## 2023-04-28 RX ORDER — FENTANYL CITRATE 50 UG/ML
25 INJECTION, SOLUTION INTRAMUSCULAR; INTRAVENOUS EVERY 5 MIN PRN
Status: DISCONTINUED | OUTPATIENT
Start: 2023-04-28 | End: 2023-04-28 | Stop reason: HOSPADM

## 2023-04-28 RX ORDER — ACETAMINOPHEN 10 MG/ML
INJECTION, SOLUTION INTRAVENOUS
Status: DISCONTINUED | OUTPATIENT
Start: 2023-04-28 | End: 2023-04-28

## 2023-04-28 RX ORDER — LIDOCAINE HYDROCHLORIDE 10 MG/ML
1 INJECTION, SOLUTION EPIDURAL; INFILTRATION; INTRACAUDAL; PERINEURAL ONCE
Status: DISCONTINUED | OUTPATIENT
Start: 2023-04-28 | End: 2023-04-28 | Stop reason: HOSPADM

## 2023-04-28 RX ORDER — ONDANSETRON HYDROCHLORIDE 2 MG/ML
INJECTION, SOLUTION INTRAMUSCULAR; INTRAVENOUS
Status: DISCONTINUED | OUTPATIENT
Start: 2023-04-28 | End: 2023-04-28

## 2023-04-28 RX ORDER — HYDROMORPHONE HYDROCHLORIDE 2 MG/ML
0.2 INJECTION, SOLUTION INTRAMUSCULAR; INTRAVENOUS; SUBCUTANEOUS EVERY 5 MIN PRN
Status: DISCONTINUED | OUTPATIENT
Start: 2023-04-28 | End: 2023-04-28 | Stop reason: HOSPADM

## 2023-04-28 RX ORDER — FENTANYL CITRATE 50 UG/ML
INJECTION, SOLUTION INTRAMUSCULAR; INTRAVENOUS
Status: DISCONTINUED | OUTPATIENT
Start: 2023-04-28 | End: 2023-04-28

## 2023-04-28 RX ORDER — PROPOFOL 10 MG/ML
VIAL (ML) INTRAVENOUS
Status: DISCONTINUED | OUTPATIENT
Start: 2023-04-28 | End: 2023-04-28

## 2023-04-28 RX ORDER — MIDAZOLAM HYDROCHLORIDE 1 MG/ML
INJECTION INTRAMUSCULAR; INTRAVENOUS
Status: DISCONTINUED | OUTPATIENT
Start: 2023-04-28 | End: 2023-04-28

## 2023-04-28 RX ORDER — OXYCODONE HYDROCHLORIDE 5 MG/1
5 TABLET ORAL
Status: DISCONTINUED | OUTPATIENT
Start: 2023-04-28 | End: 2023-04-28 | Stop reason: HOSPADM

## 2023-04-28 RX ORDER — BUPIVACAINE HYDROCHLORIDE AND EPINEPHRINE 5; 5 MG/ML; UG/ML
INJECTION, SOLUTION EPIDURAL; INTRACAUDAL; PERINEURAL
Status: DISCONTINUED | OUTPATIENT
Start: 2023-04-28 | End: 2023-04-28 | Stop reason: HOSPADM

## 2023-04-28 RX ORDER — CEFAZOLIN SODIUM 2 G/50ML
2 SOLUTION INTRAVENOUS
Status: COMPLETED | OUTPATIENT
Start: 2023-04-28 | End: 2023-04-28

## 2023-04-28 RX ORDER — LIDOCAINE HYDROCHLORIDE 20 MG/ML
INJECTION INTRAVENOUS
Status: DISCONTINUED | OUTPATIENT
Start: 2023-04-28 | End: 2023-04-28

## 2023-04-28 RX ORDER — DEXAMETHASONE SODIUM PHOSPHATE 4 MG/ML
INJECTION, SOLUTION INTRA-ARTICULAR; INTRALESIONAL; INTRAMUSCULAR; INTRAVENOUS; SOFT TISSUE
Status: DISCONTINUED | OUTPATIENT
Start: 2023-04-28 | End: 2023-04-28

## 2023-04-28 RX ADMIN — FENTANYL CITRATE 50 MCG: 50 INJECTION, SOLUTION INTRAMUSCULAR; INTRAVENOUS at 07:04

## 2023-04-28 RX ADMIN — DEXAMETHASONE SODIUM PHOSPHATE 4 MG: 4 INJECTION, SOLUTION INTRA-ARTICULAR; INTRALESIONAL; INTRAMUSCULAR; INTRAVENOUS; SOFT TISSUE at 07:04

## 2023-04-28 RX ADMIN — ONDANSETRON 4 MG: 2 INJECTION INTRAMUSCULAR; INTRAVENOUS at 07:04

## 2023-04-28 RX ADMIN — PROPOFOL 150 MG: 10 INJECTION, EMULSION INTRAVENOUS at 07:04

## 2023-04-28 RX ADMIN — CEFAZOLIN SODIUM 2 G: 2 SOLUTION INTRAVENOUS at 07:04

## 2023-04-28 RX ADMIN — FENTANYL CITRATE 25 MCG: 50 INJECTION, SOLUTION INTRAMUSCULAR; INTRAVENOUS at 08:04

## 2023-04-28 RX ADMIN — LIDOCAINE HYDROCHLORIDE 75 MG: 20 INJECTION, SOLUTION INTRAVENOUS at 07:04

## 2023-04-28 RX ADMIN — ACETAMINOPHEN 1000 MG: 10 INJECTION, SOLUTION INTRAVENOUS at 07:04

## 2023-04-28 RX ADMIN — SODIUM CHLORIDE, SODIUM GLUCONATE, SODIUM ACETATE, POTASSIUM CHLORIDE AND MAGNESIUM CHLORIDE: 526; 502; 368; 37; 30 INJECTION, SOLUTION INTRAVENOUS at 06:04

## 2023-04-28 RX ADMIN — MIDAZOLAM HYDROCHLORIDE 2 MG: 1 INJECTION, SOLUTION INTRAMUSCULAR; INTRAVENOUS at 06:04

## 2023-04-28 RX ADMIN — OXYCODONE HYDROCHLORIDE 5 MG: 5 TABLET ORAL at 08:04

## 2023-04-28 RX ADMIN — GLYCOPYRROLATE 0.2 MG: 0.2 INJECTION, SOLUTION INTRAMUSCULAR; INTRAVITREAL at 06:04

## 2023-04-28 NOTE — PLAN OF CARE
Patient and mother given discharge instructions. Post-op appointment made for May 4th. Prescription for Norco and Zofran delivered at bedside  Educated on post-anesthesia precautions, dressing care and when to notify MD. Verbalized understanding. Peripheral IV removed with catheter intact. Dressing to knee clean, dry and intact. All belongings given back to patient. Patient transferred to car via wheelchair  and left with mother to home.

## 2023-04-28 NOTE — ANESTHESIA PROCEDURE NOTES
Intubation    Date/Time: 4/28/2023 7:04 AM  Performed by: Sina Jones CRNA  Authorized by: Kong Dietrich MD     Intubation:     Induction:  Intravenous    Intubated:  Postinduction    Mask Ventilation:  Easy mask    Attempts:  1    Attempted By:  CRNA    Difficult Airway Encountered?: No      Complications:  None    Airway Device:  Supraglottic airway/LMA    Airway Device Size:  3.0    Style/Cuff Inflation:  Cuffed (inflated to minimal occlusive pressure)    Secured at:  The lips    Placement Verified By:  Capnometry    Complicating Factors:  None

## 2023-04-28 NOTE — OP NOTE
[facility]  Orthopedic Surgery Department  Operative Note    SUMMARY     Date of Procedure: 4/28/2023     Procedure: Procedure(s) (LRB):  BURSECTOMY (Right)     Surgeon(s) and Role:     * Bertram Smart II, MD - Primary    Assisting Surgeon: None    First Assist:  MICHAEL Capone    Pre-Operative Diagnosis: Prepatellar bursitis of right knee [M70.41]  Preop testing [Z01.818]    Post-Operative Diagnosis: Post-Op Diagnosis Codes:     * Prepatellar bursitis of right knee [M70.41]     * Preop testing [Z01.818]    Anesthesia: General    Technical Procedures Used: Excision prepatellar bursa right knee    Description of the Findings of the Procedure: Dictated    Significant Surgical Tasks Conducted by the Assistant(s), if Applicable: Positioning and prepping the patient, retraction during bursa excision, wound closure and bandage application    Complications: No    Estimated Blood Loss (EBL): * No values recorded between 4/28/2023  7:18 AM and 4/28/2023  7:41 AM *           Implants: * No implants in log *    Specimens:   Specimen (24h ago, onward)       Start     Ordered    04/28/23 0727  Specimen to Pathology, Surgery Orthopedics  Once        Comments: Pre-op Diagnosis: Prepatellar bursitis of right knee [M70.41]Preop testing [Z01.818]Procedure(s):BURSECTOMY Number of specimens: 1Name of specimens: RIGHT PREPATELLAR BURSA     References:    Click here for ordering Quick Tip   Question Answer Comment   Procedure Type: Orthopedics    Which provider would you like to cc? BERTRAM SMART II    Release to patient Immediate        04/28/23 0727                            Condition: Good    Disposition: PACU - hemodynamically stable.    Attestation: I was present for the entire procedure.    Procedure In Detail:  The patient was brought to the operating room and placed on the table in the supine position.  The patient underwent anesthesia with the anesthesia service.  The right lower extremity was  prepped and draped in the normal sterile fashion.  An eschmark was used to exhangunate the limb and the tourniquet was taken up to 250mm Hg.  A midline incision was made and a 15 blade scalpel was used to dissect the bursa free from the surrounding tissue.  It was remove whole and sent for pathology.  The wound was then irrigated with saline and closed in a layered fashion.  An intraoperative dressing was applied and the patient was awakened from anesthesia and taken to recovery where she was noted to be stable post operatively. Needle and lap counts were correct at the end of the case.

## 2023-04-28 NOTE — DISCHARGE SUMMARY
[facility]  Department of Orthopedic Surgery  Discharge Note  Short Stay    Admit Date: 4/28/2023    Discharge Date and Time: No discharge date for patient encounter.     Attending Physician: Bertram Garcia II, MD     Discharge Provider: Bertram Garcia II    Diagnoses:  Active Hospital Problems    Diagnosis  POA    *Prepatellar bursitis of right knee [M70.41]  Yes      Resolved Hospital Problems   No resolved problems to display.       Discharged Condition: good    Hospital Course: Patient was admitted for an outpatient procedure and tolerated the procedure well with no complications.    Final Diagnoses: Same as principal problem.    Disposition: Home or Self Care    Follow up/Patient Instructions:    Medications:  Reconciled Home Medications:      Medication List        CONTINUE taking these medications      acyclovir 400 MG tablet  Commonly known as: ZOVIRAX  TAKE 1 TABLET(400 MG) BY MOUTH TWICE DAILY     benzonatate 100 MG capsule  Commonly known as: TESSALON  Take 1 capsule (100 mg total) by mouth 3 (three) times daily as needed for Cough.     estradioL 1 MG tablet  Commonly known as: ESTRACE  TK 1 T PO QD     fluconazole 150 MG Tab  Commonly known as: DIFLUCAN  Take 1 tablet (150 mg total) by mouth once daily.     HYDROcodone-acetaminophen 7.5-325 mg per tablet  Commonly known as: NORCO  Take 1 tablet by mouth every 6 (six) hours as needed for Pain.     MEDROL ORAL  Take by mouth. Took last dose 4/27/ for cough, wheezing     multivitamin capsule  Take 1 capsule by mouth once daily.     ondansetron 4 MG tablet  Commonly known as: ZOFRAN  Take 1 tablet (4 mg total) by mouth 2 (two) times daily.     promethazine-dextromethorphan 6.25-15 mg/5 mL Syrp  Commonly known as: PROMETHAZINE-DM  Take 5 mLs by mouth nightly as needed (cough).            Discharge Procedure Orders   Diet Adult Regular     Ice to affected area     Notify your health care provider if you experience any of the following:  increased  confusion or weakness     Notify your health care provider if you experience any of the following:  persistent dizziness, light-headedness, or visual disturbances     Notify your health care provider if you experience any of the following:  worsening rash     Notify your health care provider if you experience any of the following:  severe persistent headache     Notify your health care provider if you experience any of the following:  difficulty breathing or increased cough     Notify your health care provider if you experience any of the following:  redness, tenderness, or signs of infection (pain, swelling, redness, odor or green/yellow discharge around incision site)     Notify your health care provider if you experience any of the following:  severe uncontrolled pain     Notify your health care provider if you experience any of the following:  persistent nausea and vomiting or diarrhea     Notify your health care provider if you experience any of the following:  temperature >100.4     Leave dressing on - Keep it clean, dry, and intact until clinic visit     Activity as tolerated      Follow-up Information       Bertram WILNER Garcia II, MD Follow up in 2 week(s).    Specialty: Orthopedic Surgery  Contact information:  [address] [place] [address] [address] [place] [id number] [phone]                             Discharge Procedure Orders (must include Diet, Follow-up, Activity):   Discharge Procedure Orders (must include Diet, Follow-up, Activity)   Diet Adult Regular     Ice to affected area     Notify your health care provider if you experience any of the following:  increased confusion or weakness     Notify your health care provider if you experience any of the following:  persistent dizziness, light-headedness, or visual disturbances     Notify your health care provider if you experience any of the following:  worsening rash     Notify your health care provider if you experience any of the following:  severe persistent  headache     Notify your health care provider if you experience any of the following:  difficulty breathing or increased cough     Notify your health care provider if you experience any of the following:  redness, tenderness, or signs of infection (pain, swelling, redness, odor or green/yellow discharge around incision site)     Notify your health care provider if you experience any of the following:  severe uncontrolled pain     Notify your health care provider if you experience any of the following:  persistent nausea and vomiting or diarrhea     Notify your health care provider if you experience any of the following:  temperature >100.4     Leave dressing on - Keep it clean, dry, and intact until clinic visit     Activity as tolerated

## 2023-04-28 NOTE — TRANSFER OF CARE
"Anesthesia Transfer of Care Note    Patient: Dayo Cortes    Procedure(s) Performed: Procedure(s) (LRB):  BURSECTOMY (Right)    Patient location: PACU    Anesthesia Type: general    Transport from OR: Transported from OR on 2-3 L/min O2 by NC with adequate spontaneous ventilation    Post pain: adequate analgesia    Post assessment: no apparent anesthetic complications and tolerated procedure well    Post vital signs: stable    Level of consciousness: responds to stimulation and sedated    Nausea/Vomiting: no nausea/vomiting    Complications: none    Transfer of care protocol was followed      Last vitals:   Visit Vitals  /82 (BP Location: Left arm, Patient Position: Sitting)   Pulse 70   Temp 36.9 °C (98.4 °F) (Temporal)   Resp 16   Ht 5' 6" (1.676 m)   Wt 68 kg (150 lb)   LMP 03/12/2019 (Exact Date)   SpO2 100%   Breastfeeding No   BMI 24.21 kg/m²     "

## 2023-04-28 NOTE — DISCHARGE INSTRUCTIONS
General Information:    1.  Do not drink alcoholic beverages including beer for 24 hours or as long as you are on pain medication..  2.  Do not drive a motor vehicle, operate machinery or power tools, or signs legal papers for 24 hours or as long as you are on pain medication.   3.  You may experience light-headedness, dizziness, and sleepiness following surgery. Please do not stay alone. A responsible adult should be with you for this 24 hour period.  4.  Go home and rest.    5. Progress slowly to a normal diet unless instructed.  Otherwise, begin with liquids such as soft drinks, then soup and crackers working up to solid foods. Drink plenty of nonalcoholic fluids.  6.  Certain anesthetics and pain medications produce nausea and vomiting in certain       individuals. If nausea becomes a problem at home, call you doctor.    7. A nurse will be calling you sometime after surgery. Do not be alarmed. This is our way of finding out how you are doing.    8. Several times every hour while you are awake, take 2-3 deep breaths and cough. If you had stomach surgery hold a pillow or rolled towel firmly against your stomach before you cough. This will help with any pain the cough might cause.  9. Several times every hour while you are awake, pump and flex your feet 5-6 times and do foot circles. This will help prevent blood clots.    10.Call your doctor for severe pain, bleeding, fever, or signs or symptoms of infection (pain, swelling, redness, foul odor, drainage).   Post op instructions for prevention of DVT  What is deep vein thrombosis?  Deep vein thrombosis (DVT) is the medical term for blood clots in the deep veins of the leg.  These blood clots can be dangerous.  A DVT can block a blood vessel and keep blood from getting where it needs to go.  Another problem is that the clot can travel to other parts of the body such as the lungs.  A clot that travels to the lungs is called a pulmonary embolus (PE) and can cause  serious problems with breathing which can lead to death.  Am I at risk for DVT/PE?  If you are not very active, you are at risk of DVT.  Anyone confined to bed, sitting for long periods of time, recovering from surgery, etc. increases the risk of DVT.  Other risk factors are cancer diagnosis, certain medications, estrogen replacement in any form,older age, obesity, pregnancy, smoking, history of clotting disorders, and dehydration.  How will I know if I have a DVT?  Swelling in the lower leg  Pain  Warmth, redness, hardness or bulging of the vein  If you have any of these symptoms, call your doctors office right away.  Some people will not have any symptoms until the clot moves to the lungs.  What are the symptoms of a PE?  Panting, shortness of breath, or trouble breathing  Sharp, knife-like chest pain when you breathe  Coughing or coughing up blood  Rapid heartbeat  If you have any of these symptoms or get worse quickly, call 911 for emergency treatment.  How can I prevent a DVT?  Avoid long periods of inactivity and dont cross your legs--get up and walk around every hour or so.  Stay active--walking after surgery is highly encouraged.  This means you should get out of the house and walk in the neighborhood.  Going up and down stairs will not impair healing (unless advised against such activity by your doctor).    Drink plenty of noncaffeinated, nonalcoholic fluids each day to prevent dehydration.  Wear special support stockings, if they have been advised by your doctor.  If you travel, stop at least once an hour and walk around.  Avoid smoking (assistance with stopping is available through your healthcare provider)  Always notify your doctor if you are not able to follow the post operative instructions that are given to you at the time of discharge.  It may be necessary to prescribe one of the medications available to prevent DVT. Using an Incentive Spirometer    An incentive spirometer is a device that helps  you do deep breathing exercises. These exercises expand your lungs, aid in circulation, and help prevent pneumonia. Deep breathing exercises also help you breathe better and improve the function of your lungs by:  Keeping your lungs clear  Strengthening your breathing muscles  Helping prevent respiratory complications or problems  The incentive spirometer gives you a way to take an active part in recover. A nurse or therapist will teach you breathing exercises. To do these exercises, you will breathe in through your mouth and not your nose. The incentive spirometer only works correctly if you breathe in through your mouth.  Steps to clear lungs  Step 1. Exhale normally. Then, inhale normally.  Relax and breathe out.  Step 2. Place your lips tightly around the mouthpiece.  Make sure the device is upright and not tilted.  Step 3. Inhale as much air as you can through the mouthpiece (don't breath through your nose).  Inhale slowly and deeply.  Hold your breath long enough to keep the balls or disk raised for at least 3 to 5 seconds, or as instructed by your healthcare provider.  Some spirometers have an indicator to let you know that you are breathing in too fast. If the indicator goes off, breathe in more slowly.  Step 4. Repeat the exercise regularly.  Do this exercise every hour while you're awake, or as instructed by your healthcare provider.  If you were taught deep breathing and coughing exercises, do them regularly as instructed by your healthcare provider.      Discharge Instructions: After Your Surgery/Procedure  Youve just had surgery. During surgery you were given medicine called anesthesia to keep you relaxed and free of pain. After surgery you may have some pain or nausea. This is common. Here are some tips for feeling better and getting well after surgery.     Stay on schedule with your medication.   Going home  Your doctor or nurse will show you how to take care of yourself when you go home. He or she  "will also answer your questions. Have an adult family member or friend drive you home.      For your safety we recommend these precaution for the first 24 hours after your procedure:  Do not drive or use heavy equipment.  Do not make important decisions or sign legal papers.  Do not drink alcohol.  Have someone stay with you, if needed. He or she can watch for problems and help keep you safe.  Your concentration, balance, coordination, and judgement may be impaired for many hours after anesthesia.  Use caution when ambulating or standing up.     You may feel weak and "washed out" after anesthesia and surgery.      Subtle residual effects of general anesthesia or sedation with regional / local anesthesia can last more than 24 hours.  Rest for the remainder of the day or longer if your Doctor/Surgeon has advised you to do so.  Although you may feel normal within the first 24 hours, your reflexes and mental ability may be impaired without you realizing it.  You may feel dizzy, lightheaded or sleepy for 24 hours or longer.      Be sure to go to all follow-up visits with your doctor. And rest after your surgery for as long as your doctor tells you to.  Coping with pain  If you have pain after surgery, pain medicine will help you feel better. Take it as told, before pain becomes severe. Also, ask your doctor or pharmacist about other ways to control pain. This might be with heat, ice, or relaxation. And follow any other instructions your surgeon or nurse gives you.  Tips for taking pain medicine  To get the best relief possible, remember these points:  Pain medicines can upset your stomach. Taking them with a little food may help.  Most pain relievers taken by mouth need at least 20 to 30 minutes to start to work.  Taking medicine on a schedule can help you remember to take it. Try to time your medicine so that you can take it before starting an activity. This might be before you get dressed, go for a walk, or sit down " for dinner.  Constipation is a common side effect of pain medicines. Call your doctor before taking any medicines such as laxatives or stool softeners to help ease constipation. Also ask if you should skip any foods. Drinking lots of fluids and eating foods such as fruits and vegetables that are high in fiber can also help. Remember, do not take laxatives unless your surgeon has prescribed them.  Drinking alcohol and taking pain medicine can cause dizziness and slow your breathing. It can even be deadly. Do not drink alcohol while taking pain medicine.  Pain medicine can make you react more slowly to things. Do not drive or run machinery while taking pain medicine.  Your health care provider may tell you to take acetaminophen to help ease your pain. Ask him or her how much you are supposed to take each day. Acetaminophen or other pain relievers may interact with your prescription medicines or other over-the-counter (OTC) drugs. Some prescription medicines have acetaminophen and other ingredients. Using both prescription and OTC acetaminophen for pain can cause you to overdose. Read the labels on your OTC medicines with care. This will help you to clearly know the list of ingredients, how much to take, and any warnings. It may also help you not take too much acetaminophen. If you have questions or do not understand the information, ask your pharmacist or health care provider to explain it to you before you take the OTC medicine.  Managing nausea  Some people have an upset stomach after surgery. This is often because of anesthesia, pain, or pain medicine, or the stress of surgery. These tips will help you handle nausea and eat healthy foods as you get better. If you were on a special food plan before surgery, ask your doctor if you should follow it while you get better. These tips may help:  Do not push yourself to eat. Your body will tell you when to eat and how much.  Start off with clear liquids and soup. They are  easier to digest.  Next try semi-solid foods, such as mashed potatoes, applesauce, and gelatin, as you feel ready.  Slowly move to solid foods. Dont eat fatty, rich, or spicy foods at first.  Do not force yourself to have 3 large meals a day. Instead eat smaller amounts more often.  Take pain medicines with a small amount of solid food, such as crackers or toast, to avoid nausea.     Call your surgeon if  You still have pain an hour after taking medicine. The medicine may not be strong enough.  You feel too sleepy, dizzy, or groggy. The medicine may be too strong.  You have side effects like nausea, vomiting, or skin changes, such as rash, itching, or hives.       If you have obstructive sleep apnea  You were given anesthesia medicine during surgery to keep you comfortable and free of pain. After surgery, you may have more apnea spells because of this medicine and other medicines you were given. The spells may last longer than usual.   At home:  Keep using the continuous positive airway pressure (CPAP) device when you sleep. Unless your health care provider tells you not to, use it when you sleep, day or night. CPAP is a common device used to treat obstructive sleep apnea.  Talk with your provider before taking any pain medicine, muscle relaxants, or sedatives. Your provider will tell you about the possible dangers of taking these medicines.  © 6324-4278 [employer]. [address] [place] [id number]. All rights reserved. This information is not intended as a substitute for professional medical care. Always follow your healthcare professional's instructions.     Using Opioids for Pain Management     Your doctor has given instructions for you to take an opioid.  This is a drug for bad pain.  It helps control pain without causing bleeding and kidney problems.  Common opioid names are morphine, hydromorphone, oxycodone, and methadone. These drugs are called narcotics.    There are several safety  concerns you need to know.     It is against the law to give or sell this drug to another person.  You must keep this medicine safely locked.    You may have side effects from taking this medication.  These include nausea, itching, sweating, sleepiness, a change in your ability to breathe, and depression.  Do not take alcohol or sleeping pills opioids.    Long-term opoid use may no longer giver you relief from pain.  It can cause you stomach pain, mental anxiety, and headaches.  Long-term opoid use can potentially lead to unlawful street drug abuse and reduce your ability to stay employed.    Your body may become opioid tolerant if you need to take more to get relief.    You must stop taking opioids if you begin having more pain as a result of the medicine.    Opioid withdrawal occurs when you have to stop taking the drug.  It can cause you to have nausea, vomiting, diarrhea, stomach pain, anxiety, and dilated pupils in your eyes. This condition means you are opioid dependent.    Addiction is a drug induced brain disease. It means there are changes in how your brain is working.  Children, teens, and young adults under 25 years old are more likely to get addicted to opioids.      Addiction can happen with repeated opioid use.  It does not happen with short-term use of two weeks or less.       For more information, please speak with your doctor or pharmacist.       We hope your stay was comfortable as you heal now, mend and rest.    For we have enjoyed taking care of you by giving your our best.    And as you get better, by regaining your health and strength;   We count it as a privilege to have served you and hope your time at [facility] was well spent.      Thank  You!!!

## 2023-04-28 NOTE — TELEPHONE ENCOUNTER
Patient advised to leave bandage on and she is having normal post op bleeding and should not be concerned. She was told if the bandage stats seeping out the side she can call back and speak with the on call nurses for the weekend.

## 2023-04-28 NOTE — ANESTHESIA POSTPROCEDURE EVALUATION
Anesthesia Post Evaluation    Patient: Dayo Cortes    Procedure(s) Performed: Procedure(s) (LRB):  BURSECTOMY (Right)    Final Anesthesia Type: general      Patient location during evaluation: PACU  Patient participation: Yes- Able to Participate  Level of consciousness: awake and alert and oriented  Post-procedure vital signs: reviewed and stable  Pain management: adequate  Airway patency: patent  MINNA mitigation strategies: Multimodal analgesia and Extubation while patient is awake  PONV status at discharge: No PONV  Anesthetic complications: no      Cardiovascular status: blood pressure returned to baseline  Respiratory status: unassisted, spontaneous ventilation and room air  Hydration status: euvolemic  Follow-up not needed.          Vitals Value Taken Time   /85 04/28/23 0902   Temp 36.4 °C (97.5 °F) 04/28/23 0835   Pulse 63 04/28/23 0902   Resp 16 04/28/23 0902   SpO2 100 % 04/28/23 0902         Event Time   Out of Recovery 08:44:00         Pain/Temo Score: Pain Rating Prior to Med Admin: 6 (4/28/2023  8:20 AM)  Temo Score: 10 (4/28/2023  9:10 AM)

## 2023-04-29 ENCOUNTER — NURSE TRIAGE (OUTPATIENT)
Dept: ADMINISTRATIVE | Facility: CLINIC | Age: 53
End: 2023-04-29
Payer: COMMERCIAL

## 2023-04-29 ENCOUNTER — PATIENT MESSAGE (OUTPATIENT)
Dept: ORTHOPEDICS | Facility: CLINIC | Age: 53
End: 2023-04-29
Payer: COMMERCIAL

## 2023-04-29 NOTE — TELEPHONE ENCOUNTER
"Patient states she had a bursectomy on her right knee yesterday, and she woke up around 5 am in severe pain (11/10). She has taken the prescribed Norco around that time and feels like the pain is worsening. She does have an ice pack in place as well. Contacted Dr. Campo, on call, who recommended patient take an NSAID as well such as 800 mg ibuprofen TID or 440 mg aleve BID. Relayed information to patient. She states she takes "two aleve for a backache." States her pain is severe and unbearable. Recommended going to the ED if unable to control pain.     Reason for Disposition   [1] SEVERE post-op pain (e.g., excruciating, pain scale 8-10) AND [2] not controlled with pain medications    Additional Information   Negative: Sounds like a life-threatening emergency to the triager   Negative: [1] Widespread rash AND [2] bright red, sunburn-like   Negative: [1] SEVERE headache AND [2] after spinal (epidural) anesthesia   Negative: [1] Vomiting AND [2] persists > 4 hours   Negative: [1] Vomiting AND [2] abdomen looks much more swollen than usual   Negative: [1] Drinking very little AND [2] dehydration suspected (e.g., no urine > 12 hours, very dry mouth, very lightheaded)   Negative: Patient sounds very sick or weak to the triager   Negative: Sounds like a serious complication to the triager   Negative: Fever > 100.4 F (38.0 C)    Protocols used: Post-Op Symptoms and [id number]    "

## 2023-05-01 ENCOUNTER — TELEPHONE (OUTPATIENT)
Dept: ORTHOPEDICS | Facility: CLINIC | Age: 53
End: 2023-05-01
Payer: COMMERCIAL

## 2023-05-01 VITALS
WEIGHT: 150 LBS | HEIGHT: 66 IN | OXYGEN SATURATION: 100 % | TEMPERATURE: 98 F | DIASTOLIC BLOOD PRESSURE: 85 MMHG | HEART RATE: 63 BPM | SYSTOLIC BLOOD PRESSURE: 125 MMHG | BODY MASS INDEX: 24.11 KG/M2 | RESPIRATION RATE: 16 BRPM

## 2023-05-01 NOTE — OR NURSING
05/01/23  1140 Patient called to enquire about stopping the hydrocodone because it makes her dizzy. She already takes ibuprofen 600 mg po q6h prn for back pain and wants to continue with that.  She will do that and call MD for further instructions. Also icing knee and elevating.

## 2023-05-01 NOTE — TELEPHONE ENCOUNTER
Answered via [facility] portal.     ----- Message from Georgetteblair Pedrazaangely sent at 4/29/2023  8:22 AM CDT -----  Regarding: advise  Contact: patient  Type: Needs Medical Advice  Who Called:  Patient  Symptoms (please be specific):    How long has patient had these symptoms:    Pharmacy name and phone #:    Best Call Back Number: [phone] (home)     Additional Information: Patient wants to know if she can take ibuprofen and advil between using the oxycodone. Due to the pain not decreasing. Her pain is at a 11.         Is The Patient Presenting As Previously Scheduled?: No, they are coming in before their scheduled appointment Is This A New Presentation, Or A Follow-Up?: Rash

## 2023-05-02 LAB
FINAL PATHOLOGIC DIAGNOSIS: NORMAL
GROSS: NORMAL
Lab: NORMAL

## 2023-05-04 ENCOUNTER — OFFICE VISIT (OUTPATIENT)
Dept: ORTHOPEDICS | Facility: CLINIC | Age: 53
End: 2023-05-04
Payer: COMMERCIAL

## 2023-05-04 VITALS — HEIGHT: 66 IN | BODY MASS INDEX: 24.11 KG/M2 | WEIGHT: 150 LBS

## 2023-05-04 DIAGNOSIS — M70.41 PREPATELLAR BURSITIS OF RIGHT KNEE: Primary | ICD-10-CM

## 2023-05-04 PROCEDURE — 1160F PR REVIEW ALL MEDS BY PRESCRIBER/CLIN PHARMACIST DOCUMENTED: ICD-10-PCS | Mod: CPTII,S$GLB,, | Performed by: ORTHOPAEDIC SURGERY

## 2023-05-04 PROCEDURE — 3008F PR BODY MASS INDEX (BMI) DOCUMENTED: ICD-10-PCS | Mod: CPTII,S$GLB,, | Performed by: ORTHOPAEDIC SURGERY

## 2023-05-04 PROCEDURE — 99024 PR POST-OP FOLLOW-UP VISIT: ICD-10-PCS | Mod: S$GLB,,, | Performed by: ORTHOPAEDIC SURGERY

## 2023-05-04 PROCEDURE — 3008F BODY MASS INDEX DOCD: CPT | Mod: CPTII,S$GLB,, | Performed by: ORTHOPAEDIC SURGERY

## 2023-05-04 PROCEDURE — 1159F PR MEDICATION LIST DOCUMENTED IN MEDICAL RECORD: ICD-10-PCS | Mod: CPTII,S$GLB,, | Performed by: ORTHOPAEDIC SURGERY

## 2023-05-04 PROCEDURE — 1160F RVW MEDS BY RX/DR IN RCRD: CPT | Mod: CPTII,S$GLB,, | Performed by: ORTHOPAEDIC SURGERY

## 2023-05-04 PROCEDURE — 99999 PR PBB SHADOW E&M-EST. PATIENT-LVL II: ICD-10-PCS | Mod: PBBFAC,,, | Performed by: ORTHOPAEDIC SURGERY

## 2023-05-04 PROCEDURE — 1159F MED LIST DOCD IN RCRD: CPT | Mod: CPTII,S$GLB,, | Performed by: ORTHOPAEDIC SURGERY

## 2023-05-04 PROCEDURE — 99999 PR PBB SHADOW E&M-EST. PATIENT-LVL II: CPT | Mod: PBBFAC,,, | Performed by: ORTHOPAEDIC SURGERY

## 2023-05-04 PROCEDURE — 99024 POSTOP FOLLOW-UP VISIT: CPT | Mod: S$GLB,,, | Performed by: ORTHOPAEDIC SURGERY

## 2023-05-04 NOTE — PROGRESS NOTES
CC:  52-year-old female follows up status post excision of her right prepatellar bursa.  Date of surgery was 04/28/2023.  She is 6 days postop.    RLE:  Incision is clean, dry, and intact.  Healing well with no sign of infection  Grossly intact motor sensory function distally  Plus 2 distal pulses    Dx:  Status post excision of right prepatellar bursa, stable    Plan: Incision cleaned and fresh bandage applied  Continue daily dressing changes  Follow up in 1 week for wound inspection

## 2023-05-15 ENCOUNTER — OFFICE VISIT (OUTPATIENT)
Dept: ORTHOPEDICS | Facility: CLINIC | Age: 53
End: 2023-05-15
Payer: COMMERCIAL

## 2023-05-15 VITALS — HEIGHT: 66 IN | WEIGHT: 149.94 LBS | BODY MASS INDEX: 24.1 KG/M2

## 2023-05-15 DIAGNOSIS — M70.41 PREPATELLAR BURSITIS OF RIGHT KNEE: Primary | ICD-10-CM

## 2023-05-15 PROCEDURE — 1159F PR MEDICATION LIST DOCUMENTED IN MEDICAL RECORD: ICD-10-PCS | Mod: CPTII,S$GLB,, | Performed by: ORTHOPAEDIC SURGERY

## 2023-05-15 PROCEDURE — 3008F PR BODY MASS INDEX (BMI) DOCUMENTED: ICD-10-PCS | Mod: CPTII,S$GLB,, | Performed by: ORTHOPAEDIC SURGERY

## 2023-05-15 PROCEDURE — 99024 POSTOP FOLLOW-UP VISIT: CPT | Mod: S$GLB,,, | Performed by: ORTHOPAEDIC SURGERY

## 2023-05-15 PROCEDURE — 99024 PR POST-OP FOLLOW-UP VISIT: ICD-10-PCS | Mod: S$GLB,,, | Performed by: ORTHOPAEDIC SURGERY

## 2023-05-15 PROCEDURE — 1160F PR REVIEW ALL MEDS BY PRESCRIBER/CLIN PHARMACIST DOCUMENTED: ICD-10-PCS | Mod: CPTII,S$GLB,, | Performed by: ORTHOPAEDIC SURGERY

## 2023-05-15 PROCEDURE — 1159F MED LIST DOCD IN RCRD: CPT | Mod: CPTII,S$GLB,, | Performed by: ORTHOPAEDIC SURGERY

## 2023-05-15 PROCEDURE — 99999 PR PBB SHADOW E&M-EST. PATIENT-LVL III: CPT | Mod: PBBFAC,,, | Performed by: ORTHOPAEDIC SURGERY

## 2023-05-15 PROCEDURE — 1160F RVW MEDS BY RX/DR IN RCRD: CPT | Mod: CPTII,S$GLB,, | Performed by: ORTHOPAEDIC SURGERY

## 2023-05-15 PROCEDURE — 99999 PR PBB SHADOW E&M-EST. PATIENT-LVL III: ICD-10-PCS | Mod: PBBFAC,,, | Performed by: ORTHOPAEDIC SURGERY

## 2023-05-15 PROCEDURE — 3008F BODY MASS INDEX DOCD: CPT | Mod: CPTII,S$GLB,, | Performed by: ORTHOPAEDIC SURGERY

## 2023-05-15 NOTE — PROGRESS NOTES
CC:  52-year-old female follows up status post excision of the right prepatellar bursa.  Date of surgery was 04/28/2023.  She is just over 2 weeks postop.  Overall she is doing well.  She has some residual swelling from the surgery but no other issues.    RLE:  Incision is clean, dry, and intact.  Healing well no sign of infection  Grossly intact motor and sensory function distally  Plus 2 distal pulses  Small seroma and the dead space from excision of the bursa    Dx:  Status post excision of the right prepatellar bursa, stable    Plan:  We discussed using a compressive wrap and icing.  Follow up in 4 weeks for re-evaluation.

## 2023-06-13 ENCOUNTER — OFFICE VISIT (OUTPATIENT)
Dept: ORTHOPEDICS | Facility: CLINIC | Age: 53
End: 2023-06-13
Payer: COMMERCIAL

## 2023-06-13 VITALS — HEIGHT: 66 IN | WEIGHT: 149.94 LBS | BODY MASS INDEX: 24.1 KG/M2

## 2023-06-13 DIAGNOSIS — M70.41 PREPATELLAR BURSITIS OF RIGHT KNEE: Primary | ICD-10-CM

## 2023-06-13 PROCEDURE — 1160F PR REVIEW ALL MEDS BY PRESCRIBER/CLIN PHARMACIST DOCUMENTED: ICD-10-PCS | Mod: CPTII,S$GLB,, | Performed by: ORTHOPAEDIC SURGERY

## 2023-06-13 PROCEDURE — 99999 PR PBB SHADOW E&M-EST. PATIENT-LVL III: CPT | Mod: PBBFAC,,, | Performed by: ORTHOPAEDIC SURGERY

## 2023-06-13 PROCEDURE — 1159F PR MEDICATION LIST DOCUMENTED IN MEDICAL RECORD: ICD-10-PCS | Mod: CPTII,S$GLB,, | Performed by: ORTHOPAEDIC SURGERY

## 2023-06-13 PROCEDURE — 3008F BODY MASS INDEX DOCD: CPT | Mod: CPTII,S$GLB,, | Performed by: ORTHOPAEDIC SURGERY

## 2023-06-13 PROCEDURE — 3008F PR BODY MASS INDEX (BMI) DOCUMENTED: ICD-10-PCS | Mod: CPTII,S$GLB,, | Performed by: ORTHOPAEDIC SURGERY

## 2023-06-13 PROCEDURE — 99024 POSTOP FOLLOW-UP VISIT: CPT | Mod: S$GLB,,, | Performed by: ORTHOPAEDIC SURGERY

## 2023-06-13 PROCEDURE — 1159F MED LIST DOCD IN RCRD: CPT | Mod: CPTII,S$GLB,, | Performed by: ORTHOPAEDIC SURGERY

## 2023-06-13 PROCEDURE — 99999 PR PBB SHADOW E&M-EST. PATIENT-LVL III: ICD-10-PCS | Mod: PBBFAC,,, | Performed by: ORTHOPAEDIC SURGERY

## 2023-06-13 PROCEDURE — 1160F RVW MEDS BY RX/DR IN RCRD: CPT | Mod: CPTII,S$GLB,, | Performed by: ORTHOPAEDIC SURGERY

## 2023-06-13 PROCEDURE — 99024 PR POST-OP FOLLOW-UP VISIT: ICD-10-PCS | Mod: S$GLB,,, | Performed by: ORTHOPAEDIC SURGERY

## 2023-06-13 NOTE — PROGRESS NOTES
CC:  52-year-old female follows up with excision of a right knee prepatellar bursa.  Date of surgery was 04/28/2023.  She is just over 6 weeks out.  She has no complaints today and everything appears to be going quite well.  She is pain-free.    Examination of the Right Lower Extremity:     Skin intact throughout.  Incision is well healed with no sign of infection  Motor function is intact distally EHL/FHL/TA/armando   +2 dorsalis pedis and posterior tibial pulses   Sensation to light touch intact distally dorsal, plantar, and first web space     Examination of the Right knee:    ROM 0 - 150   Effusion negative  Tenderness to palpation at the joint line negative  Pain during range of motion negative  Crepitation during range of motion negative     negative increased pain noted with flexion past 90   negative antalgic gait noted   negative Lachman's Test   negative Anterior Drawer Test   negative Posterior Drawer Test   negative McMurrays Test   negative Disco Test   negative Varus/Valgus instability    Dx:  Status post excision of the right knee prepatellar bursa, stable    Plan:  Activity as tolerated.  Follow up p.r.n..

## 2023-08-06 ENCOUNTER — HOSPITAL ENCOUNTER (EMERGENCY)
Facility: HOSPITAL | Age: 53
Discharge: HOME | End: 2023-08-06
Attending: EMERGENCY MEDICINE
Payer: COMMERCIAL

## 2023-08-06 VITALS
HEIGHT: 67 IN | SYSTOLIC BLOOD PRESSURE: 129 MMHG | BODY MASS INDEX: 24.33 KG/M2 | WEIGHT: 155 LBS | OXYGEN SATURATION: 100 % | RESPIRATION RATE: 18 BRPM | DIASTOLIC BLOOD PRESSURE: 74 MMHG | HEART RATE: 66 BPM | TEMPERATURE: 98 F

## 2023-08-06 DIAGNOSIS — R51.9 ACUTE NONINTRACTABLE HEADACHE, UNSPECIFIED HEADACHE TYPE: Primary | ICD-10-CM

## 2023-08-06 DIAGNOSIS — M54.2 NECK PAIN: ICD-10-CM

## 2023-08-06 LAB
ANION GAP SERPL CALC-SCNC: 7 MMOL/L (ref 8–16)
BASOPHILS # BLD AUTO: 0.03 K/UL (ref 0–0.2)
BASOPHILS NFR BLD: 0.6 % (ref 0–1.9)
BUN SERPL-MCNC: 13 MG/DL (ref 6–20)
CALCIUM SERPL-MCNC: 9.2 MG/DL (ref 8.7–10.5)
CHLORIDE SERPL-SCNC: 107 MMOL/L (ref 95–110)
CO2 SERPL-SCNC: 25 MMOL/L (ref 23–29)
CREAT SERPL-MCNC: 0.8 MG/DL (ref 0.5–1.4)
DIFFERENTIAL METHOD: ABNORMAL
EOSINOPHIL # BLD AUTO: 0.2 K/UL (ref 0–0.5)
EOSINOPHIL NFR BLD: 2.8 % (ref 0–8)
ERYTHROCYTE [DISTWIDTH] IN BLOOD BY AUTOMATED COUNT: 13.2 % (ref 11.5–14.5)
EST. GFR  (NO RACE VARIABLE): >60 ML/MIN/1.73 M^2
GLUCOSE SERPL-MCNC: 95 MG/DL (ref 70–110)
HCT VFR BLD AUTO: 39.9 % (ref 37–48.5)
HGB BLD-MCNC: 12 G/DL (ref 12–16)
IMM GRANULOCYTES # BLD AUTO: 0.01 K/UL (ref 0–0.04)
IMM GRANULOCYTES NFR BLD AUTO: 0.2 % (ref 0–0.5)
LYMPHOCYTES # BLD AUTO: 2.5 K/UL (ref 1–4.8)
LYMPHOCYTES NFR BLD: 46.3 % (ref 18–48)
MCH RBC QN AUTO: 26.2 PG (ref 27–31)
MCHC RBC AUTO-ENTMCNC: 30.1 G/DL (ref 32–36)
MCV RBC AUTO: 87 FL (ref 82–98)
MONOCYTES # BLD AUTO: 0.4 K/UL (ref 0.3–1)
MONOCYTES NFR BLD: 8.2 % (ref 4–15)
NEUTROPHILS # BLD AUTO: 2.3 K/UL (ref 1.8–7.7)
NEUTROPHILS NFR BLD: 41.9 % (ref 38–73)
NRBC BLD-RTO: 0 /100 WBC
PLATELET # BLD AUTO: 205 K/UL (ref 150–450)
PMV BLD AUTO: 9.6 FL (ref 9.2–12.9)
POTASSIUM SERPL-SCNC: 4.1 MMOL/L (ref 3.5–5.1)
RBC # BLD AUTO: 4.58 M/UL (ref 4–5.4)
SODIUM SERPL-SCNC: 139 MMOL/L (ref 136–145)
WBC # BLD AUTO: 5.38 K/UL (ref 3.9–12.7)

## 2023-08-06 PROCEDURE — 96361 HYDRATE IV INFUSION ADD-ON: CPT

## 2023-08-06 PROCEDURE — 80048 BASIC METABOLIC PNL TOTAL CA: CPT | Performed by: EMERGENCY MEDICINE

## 2023-08-06 PROCEDURE — 25000003 PHARM REV CODE 250: Performed by: EMERGENCY MEDICINE

## 2023-08-06 PROCEDURE — 85025 COMPLETE CBC W/AUTO DIFF WBC: CPT | Performed by: EMERGENCY MEDICINE

## 2023-08-06 PROCEDURE — 99284 EMERGENCY DEPT VISIT MOD MDM: CPT | Mod: 25

## 2023-08-06 PROCEDURE — 36415 COLL VENOUS BLD VENIPUNCTURE: CPT | Performed by: EMERGENCY MEDICINE

## 2023-08-06 PROCEDURE — 96374 THER/PROPH/DIAG INJ IV PUSH: CPT

## 2023-08-06 PROCEDURE — 63600175 PHARM REV CODE 636 W HCPCS: Performed by: EMERGENCY MEDICINE

## 2023-08-06 PROCEDURE — 96375 TX/PRO/DX INJ NEW DRUG ADDON: CPT

## 2023-08-06 RX ORDER — KETOROLAC TROMETHAMINE 30 MG/ML
10 INJECTION, SOLUTION INTRAMUSCULAR; INTRAVENOUS
Status: COMPLETED | OUTPATIENT
Start: 2023-08-06 | End: 2023-08-06

## 2023-08-06 RX ORDER — DROPERIDOL 2.5 MG/ML
1.25 INJECTION, SOLUTION INTRAMUSCULAR; INTRAVENOUS
Status: COMPLETED | OUTPATIENT
Start: 2023-08-06 | End: 2023-08-06

## 2023-08-06 RX ADMIN — KETOROLAC TROMETHAMINE 10 MG: 30 INJECTION, SOLUTION INTRAMUSCULAR; INTRAVENOUS at 09:08

## 2023-08-06 RX ADMIN — SODIUM CHLORIDE 1000 ML: 9 INJECTION, SOLUTION INTRAVENOUS at 09:08

## 2023-08-06 RX ADMIN — DROPERIDOL 1.25 MG: 2.5 INJECTION, SOLUTION INTRAMUSCULAR; INTRAVENOUS at 09:08

## 2023-08-07 NOTE — DISCHARGE INSTRUCTIONS
Blood pressure is mildly elevated here today and should be rechecked on follow-up with your PCP to determine planned for recheck or ultimate initiation of antihypertensive medication should it persistent in elevation.

## 2023-08-07 NOTE — ED PROVIDER NOTES
Chief complaint:  Headache (X 1.5 weeks, No history )      HPI:  Dayo Cortes is a 52 y.o. female presenting with approximately 10 days of posterior headache associated with pain in the neck starting less than 1 day prior to headache.  She has had prior neck issues.  She denies trauma.  There is no neck stiffness or restriction of range of motion.  There is no recent injury.  She denies fever.  She has had longstanding fatigue.  She denies visual change, diplopia, emesis, difficulty speaking, difficulty walking.  There is no focal numbness or weakness.  She is taken NSAIDs or acetaminophen as necessary, last yesterday with transient relief.    ROS: As per HPI and below:  No cough, congestion, chest pain, dyspnea abdominal pain, swelling, seizure, loss of consciousness, fever, unexpected weight loss, night sweats, rash, dysuria.    Review of patient's allergies indicates:  No Known Allergies    Discharge Medication List as of 8/6/2023 10:52 PM        CONTINUE these medications which have NOT CHANGED    Details   acyclovir (ZOVIRAX) 400 MG tablet TAKE 1 TABLET(400 MG) BY MOUTH TWICE DAILY, Normal      estradioL (ESTRACE) 1 MG tablet TK 1 T PO QD, Historical Med      fluconazole (DIFLUCAN) 150 MG Tab Take 1 tablet (150 mg total) by mouth once daily., Starting Tue 2/7/2023, Normal      HYDROcodone-acetaminophen (NORCO) 7.5-325 mg per tablet Take 1 tablet by mouth every 6 (six) hours as needed for Pain., Starting Thu 4/27/2023, Normal      methylprednisolone (MEDROL ORAL) Take by mouth. Took last dose 4/27/ for cough, wheezing, Historical Med      multivitamin capsule Take 1 capsule by mouth once daily., Until Discontinued, Historical Med      ondansetron (ZOFRAN) 4 MG tablet Take 1 tablet (4 mg total) by mouth 2 (two) times daily., Starting Thu 4/27/2023, Normal             PMH:  As per HPI and below:  Past Medical History:   Diagnosis Date    Abnormal Pap smear 2006    once but repeated pap smear and the resulted  WNLs.    Abnormal Pap smear of cervix     early 30 's became normal    GERD (gastroesophageal reflux disease)      Past Surgical History:   Procedure Laterality Date    COLONOSCOPY N/A 6/7/2017    Procedure: COLONOSCOPY;  Surgeon: Win Rodriguez MD;  Location: [facility];  Service: Endoscopy;  Laterality: N/A;    EXCISION OF PREPATELLAR BURSA Right 4/28/2023    Procedure: EXCISION, PREPATELLAR BURSA;  Surgeon: Bertram Garcia II, MD;  Location: [facility] OR;  Service: Orthopedics;  Laterality: Right;    HYSTERECTOMY      LUMBAR PARAVERTEBRAL FACET JOINT NERVE BLOCK Bilateral 11/17/2020    Procedure: facet joint injection;  Surgeon: Sina Allen MD;  Location: [facility] OR;  Service: Pain Management;  Laterality: Bilateral;  L4-5, L5-S1       Social History     Socioeconomic History    Marital status: Single   Tobacco Use    Smoking status: Never    Smokeless tobacco: Never   Substance and Sexual Activity    Alcohol use: Yes     Comment: occasional    Drug use: No    Sexual activity: Yes     Partners: Male     Birth control/protection: Condom     Social Determinants of Health     Physical Activity: Unknown (11/3/2022)    Exercise Vital Sign     Days of Exercise per Week: 3 days   Stress: No Stress Concern Present (12/11/2020)    [employer] - Occupational Stress Questionnaire     Feeling of Stress : Only a little   Housing Stability: Unknown (11/3/2022)    Housing Stability Vital Sign     Unable to Pay for Housing in the Last Year: Patient refused     Unstable Housing in the Last Year: Patient refused       Family History   Problem Relation Age of Onset    Breast cancer Mother 40        unconfirmed.    Diabetes Maternal Grandmother     Thyroid disease Maternal Aunt         nodules and hashimotos    Ovarian cancer Neg Hx     Colon cancer Neg Hx        Physical Exam:    Vitals:    08/06/23 2309   BP:    Pulse:    Resp: 18   Temp:      GENERAL:  No apparent distress.  Alert.    HEENT:  Moist mucous  membranes.  Normocephalic and atraumatic.    NECK:  No swelling.  Midline trachea. Supple.  Patient easily has full AP flexion extension without pain as well as ability to laterally rotate 45° or more in either direction without pain.  No neck or associated upper back tenderness to palpation.  CARDIOVASCULAR:  Regular rate and rhythm.  2+ radial pulses.  No murmur auscultated.  PULMONARY:  Lungs clear to auscultation bilaterally.  No wheezes, rales, or rhonci.    ABDOMEN:  Non-tender and non-distended.    EXTREMITIES:  Warm and well perfused.  Brisk capillary refill.  No edema.  NEUROLOGICAL:  Normal mental status.  Appropriate and conversant.  5/5 strength and sensation.  CN III through XII intact.  Normal gait.    SKIN:  No rashes or ecchymoses.    BACK:  Atraumatic.  No CVA tenderness to palpation.      Labs Reviewed   CBC W/ AUTO DIFFERENTIAL - Abnormal; Notable for the following components:       Result Value    MCH 26.2 (*)     MCHC 30.1 (*)     All other components within normal limits   BASIC METABOLIC PANEL - Abnormal; Notable for the following components:    Anion Gap 7 (*)     All other components within normal limits       Discharge Medication List as of 8/6/2023 10:52 PM        CONTINUE these medications which have NOT CHANGED    Details   acyclovir (ZOVIRAX) 400 MG tablet TAKE 1 TABLET(400 MG) BY MOUTH TWICE DAILY, Normal      estradioL (ESTRACE) 1 MG tablet TK 1 T PO QD, Historical Med      fluconazole (DIFLUCAN) 150 MG Tab Take 1 tablet (150 mg total) by mouth once daily., Starting Tue 2/7/2023, Normal      HYDROcodone-acetaminophen (NORCO) 7.5-325 mg per tablet Take 1 tablet by mouth every 6 (six) hours as needed for Pain., Starting u 4/27/2023, Normal      methylprednisolone (MEDROL ORAL) Take by mouth. Took last dose 4/27/ for cough, wheezing, Historical Med      multivitamin capsule Take 1 capsule by mouth once daily., Until Discontinued, Historical Med      ondansetron (ZOFRAN) 4 MG tablet  Take 1 tablet (4 mg total) by mouth 2 (two) times daily., Starting Thu 4/27/2023, Normal             Orders Placed This Encounter   Procedures    Complete Blood Count (CBC)    Basic Metabolic Panel (BMP)       Imaging Results    None         ED Course as of 08/07/23 0244   Sun Aug 06, 2023   2252 Patient resting comfortably with improved symptoms on reassessment. [MR]      ED Course User Index  [MR] Star Barahona MD       MDM:    52 y.o. female with 10 days of headache in this otherwise well-appearing patient.  Accompanying relevant symptoms including some pain in the neck raising possibility of cervicogenic cause of pain.  Symptomatic treatment ordered here at her request.  No other significant associated symptoms other than nonspecific fatigue with basic laboratories ordered as initial workup with consideration.  No other specific causes identified on extensive review of systems.  I have very low suspicion for alternative causes of headache such as intracranial hemorrhage, ischemic process, meningitis, idiopathic intracranial hypertension, or cavernous venous sinus thrombosis.  The patient has a non-focal neurological examination with history not consistent with emergent causes of headache warranting present therapeutic intervention.  I do not think further brain imaging or lumbar puncture are indicated at this time.  The patient has a nonfocal neurological examination with no red flags in regard to neck pain.  I doubt acute spinal cord processes requiring further spinal imaging such as CT or MRI.  I doubt epidural abscesses, severe deep space infection, transverse myelitis, discitis, cauda equina syndrome, or other emergent conditions.  Common cervicogenic causes of neck pain including spondylosis among others discussed that may be further worked up depending on clinical course.  We have discussed follow up with PCP as well as Neurology should symptoms persist.  Mild hypertension noted here is very  unlikely cause of headache.  I have very low suspicion for hypertensive emergency in this was additionally discussed extensively with the patient.  Recommendation for outpatient recheck to ensure no persistence also discussed.  Patient improved following medications here and appropriate for outpatient follow-up as discussed.  Detailed, specific return precautions reviewed.      Diagnoses:    1. Headache       Star Barahona MD  08/07/23 0244

## 2023-08-08 ENCOUNTER — OFFICE VISIT (OUTPATIENT)
Dept: FAMILY MEDICINE | Facility: CLINIC | Age: 53
End: 2023-08-08
Payer: COMMERCIAL

## 2023-08-08 VITALS
OXYGEN SATURATION: 99 % | SYSTOLIC BLOOD PRESSURE: 118 MMHG | TEMPERATURE: 98 F | HEART RATE: 84 BPM | HEIGHT: 67 IN | WEIGHT: 160.69 LBS | DIASTOLIC BLOOD PRESSURE: 84 MMHG | BODY MASS INDEX: 25.22 KG/M2

## 2023-08-08 DIAGNOSIS — M62.830 BACK SPASM: ICD-10-CM

## 2023-08-08 DIAGNOSIS — G44.86 CERVICOGENIC HEADACHE: Primary | ICD-10-CM

## 2023-08-08 DIAGNOSIS — M62.838 NECK MUSCLE SPASM: ICD-10-CM

## 2023-08-08 PROCEDURE — 3079F DIAST BP 80-89 MM HG: CPT | Mod: CPTII,S$GLB,, | Performed by: NURSE PRACTITIONER

## 2023-08-08 PROCEDURE — 3008F BODY MASS INDEX DOCD: CPT | Mod: CPTII,S$GLB,, | Performed by: NURSE PRACTITIONER

## 2023-08-08 PROCEDURE — 99999 PR PBB SHADOW E&M-EST. PATIENT-LVL III: CPT | Mod: PBBFAC,,, | Performed by: NURSE PRACTITIONER

## 2023-08-08 PROCEDURE — 99214 PR OFFICE/OUTPT VISIT, EST, LEVL IV, 30-39 MIN: ICD-10-PCS | Mod: S$GLB,,, | Performed by: NURSE PRACTITIONER

## 2023-08-08 PROCEDURE — 3079F PR MOST RECENT DIASTOLIC BLOOD PRESSURE 80-89 MM HG: ICD-10-PCS | Mod: CPTII,S$GLB,, | Performed by: NURSE PRACTITIONER

## 2023-08-08 PROCEDURE — 1159F PR MEDICATION LIST DOCUMENTED IN MEDICAL RECORD: ICD-10-PCS | Mod: CPTII,S$GLB,, | Performed by: NURSE PRACTITIONER

## 2023-08-08 PROCEDURE — 3074F PR MOST RECENT SYSTOLIC BLOOD PRESSURE < 130 MM HG: ICD-10-PCS | Mod: CPTII,S$GLB,, | Performed by: NURSE PRACTITIONER

## 2023-08-08 PROCEDURE — 99214 OFFICE O/P EST MOD 30 MIN: CPT | Mod: S$GLB,,, | Performed by: NURSE PRACTITIONER

## 2023-08-08 PROCEDURE — 1159F MED LIST DOCD IN RCRD: CPT | Mod: CPTII,S$GLB,, | Performed by: NURSE PRACTITIONER

## 2023-08-08 PROCEDURE — 3008F PR BODY MASS INDEX (BMI) DOCUMENTED: ICD-10-PCS | Mod: CPTII,S$GLB,, | Performed by: NURSE PRACTITIONER

## 2023-08-08 PROCEDURE — 99999 PR PBB SHADOW E&M-EST. PATIENT-LVL III: ICD-10-PCS | Mod: PBBFAC,,, | Performed by: NURSE PRACTITIONER

## 2023-08-08 PROCEDURE — 3074F SYST BP LT 130 MM HG: CPT | Mod: CPTII,S$GLB,, | Performed by: NURSE PRACTITIONER

## 2023-08-08 RX ORDER — TIZANIDINE 2 MG/1
4 TABLET ORAL NIGHTLY
Qty: 60 TABLET | Refills: 1 | Status: SHIPPED | OUTPATIENT
Start: 2023-08-08

## 2023-08-08 NOTE — PROGRESS NOTES
This dictation has been generated using Modal Fluency Dictation some phonetic errors may occur. Please contact author for clarification if needed.     Problem List Items Addressed This Visit    None  Visit Diagnoses       Cervicogenic headache    -  Primary    Neck muscle spasm        Back spasm                Orders Placed This Encounter    tiZANidine (ZANAFLEX) 2 MG tablet       ER follow-up for cervicogenic headache.  Patient notes tightness in the back of the neck.  Has tried muscle relaxers in the past but willing to try tizanidine.  May consider med changes depending on patient's symptomatology.  She may message me otherwise follow-up in 3 weeks.    Follow up in about 3 weeks (around 8/29/2023).    ________________________________________________________________  ________________________________________________________________      Chief Complaint   Patient presents with    Hospital Follow Up     History of present illness  This 52 y.o. presents today for complaint of follow-up from ER for headache.  Patient had elevated blood pressure reading secondary to headache and insomnia.  Notes onset of headache 1 week ago.  No associated symptoms.  No red flags.  Went to emergency room reviewed ER record and summarized.  No imaging necessary at this time.  She has had a couple of massages and plans a couple more.  She continues to work out.  Patient notes some low back pain.  Notes muscle tightness in exacerbation with movement.  She has been doing the massage.  She talks about losing weight.    ER follow up   52 y.o. female with 10 days of headache in this otherwise well-appearing patient.  Accompanying relevant symptoms including some pain in the neck raising possibility of cervicogenic cause of pain.  Symptomatic treatment ordered here at her request.  No other significant associated symptoms other than nonspecific fatigue with basic laboratories ordered as initial workup with consideration.  No other specific  causes identified on extensive review of systems.  I have very low suspicion for alternative causes of headache such as intracranial hemorrhage, ischemic process, meningitis, idiopathic intracranial hypertension, or cavernous venous sinus thrombosis.  The patient has a non-focal neurological examination with history not consistent with emergent causes of headache warranting present therapeutic intervention.  I do not think further brain imaging or lumbar puncture are indicated at this time.  The patient has a nonfocal neurological examination with no red flags in regard to neck pain.  I doubt acute spinal cord processes requiring further spinal imaging such as CT or MRI.  I doubt epidural abscesses, severe deep space infection, transverse myelitis, discitis, cauda equina syndrome, or other emergent conditions.  Common cervicogenic causes of neck pain including spondylosis among others discussed that may be further worked up depending on clinical course.  We have discussed follow up with PCP as well as Neurology should symptoms persist.  Mild hypertension noted here is very unlikely cause of headache.  I have very low suspicion for hypertensive emergency in this was additionally discussed extensively with the patient.  Recommendation for outpatient recheck to ensure no persistence also discussed.  Patient improved following medications here and appropriate for outpatient follow-up as discussed.  Detailed, specific return precautions reviewed.      Past Medical History:   Diagnosis Date    Abnormal Pap smear 2006    once but repeated pap smear and the resulted WNLs.    Abnormal Pap smear of cervix     early 30 's became normal    GERD (gastroesophageal reflux disease)        Past Surgical History:   Procedure Laterality Date    COLONOSCOPY N/A 6/7/2017    Procedure: COLONOSCOPY;  Surgeon: Win Rodriguez MD;  Location: [facility];  Service: Endoscopy;  Laterality: N/A;    EXCISION OF PREPATELLAR BURSA Right 4/28/2023     Procedure: EXCISION, PREPATELLAR BURSA;  Surgeon: Bertram Garcia II, MD;  Location: [facility] OR;  Service: Orthopedics;  Laterality: Right;    HYSTERECTOMY      LUMBAR PARAVERTEBRAL FACET JOINT NERVE BLOCK Bilateral 11/17/2020    Procedure: facet joint injection;  Surgeon: Sina Allen MD;  Location: [facility] OR;  Service: Pain Management;  Laterality: Bilateral;  L4-5, L5-S1       Family History   Problem Relation Age of Onset    Breast cancer Mother 40        unconfirmed.    Diabetes Maternal Grandmother     Thyroid disease Maternal Aunt         nodules and hashimotos    Ovarian cancer Neg Hx     Colon cancer Neg Hx        Social History     Socioeconomic History    Marital status: Single   Tobacco Use    Smoking status: Never    Smokeless tobacco: Never   Substance and Sexual Activity    Alcohol use: Yes     Comment: occasional    Drug use: No    Sexual activity: Yes     Partners: Male     Birth control/protection: Condom     Social Determinants of Health     Physical Activity: Unknown (11/3/2022)    Exercise Vital Sign     Days of Exercise per Week: 3 days   Stress: No Stress Concern Present (12/11/2020)    [employer] - Occupational Stress Questionnaire     Feeling of Stress : Only a little   Housing Stability: Unknown (11/3/2022)    Housing Stability Vital Sign     Unable to Pay for Housing in the Last Year: Patient refused     Unstable Housing in the Last Year: Patient refused       Current Outpatient Medications   Medication Sig Dispense Refill    acyclovir (ZOVIRAX) 400 MG tablet TAKE 1 TABLET(400 MG) BY MOUTH TWICE DAILY 180 tablet 3    estradioL (ESTRACE) 1 MG tablet TK 1 T PO QD      L.acid/B.animalis,bifidum/FOS (PROBIOTIC COMPLEX ORAL) Take by mouth.      multivitamin capsule Take 1 capsule by mouth once daily.      tiZANidine (ZANAFLEX) 2 MG tablet Take 2 tablets (4 mg total) by mouth every evening. 60 tablet 1     No current facility-administered medications for this visit.        Review of patient's allergies indicates:  No Known Allergies    Physical examination  Vitals Reviewed\  Vitals:    08/08/23 0908   BP: 118/84   Pulse: 84   Temp: 98.2 °F (36.8 °C)     Body mass index is 25.36 kg/m².   Gen. Well-dressed well-nourished   Skin warm dry and intact.  No rashes noted.   Neck is supple without adenopathy  Chest.  Respirations are even unlabored.  Lungs are clear to auscultation.  Cardiac regular rate and rhythm.  No chest wall adenopathy noted.  Neuro. Awake alert oriented x4.  Normal judgment and cognition noted.  Extremities no clubbing cyanosis or edema noted.     Call or return to clinic prn if these symptoms worsen or fail to improve as anticipated.

## 2023-08-08 NOTE — PATIENT INSTRUCTIONS
Garry Oshea,     If you are due for any health screening(s) below please notify me so we can arrange them to be ordered and scheduled to maintain your health. Most healthy patients complete it. Don't lose out on improving your health.     All of your core healthy metrics are met.

## 2023-08-16 ENCOUNTER — TELEPHONE (OUTPATIENT)
Dept: PAIN MEDICINE | Facility: CLINIC | Age: 53
End: 2023-08-16
Payer: COMMERCIAL

## 2023-08-16 ENCOUNTER — HOSPITAL ENCOUNTER (OUTPATIENT)
Dept: RADIOLOGY | Facility: HOSPITAL | Age: 53
Discharge: HOME | End: 2023-08-16
Attending: PHYSICAL MEDICINE & REHABILITATION
Payer: COMMERCIAL

## 2023-08-16 ENCOUNTER — OFFICE VISIT (OUTPATIENT)
Dept: SPINE | Facility: CLINIC | Age: 53
End: 2023-08-16
Payer: COMMERCIAL

## 2023-08-16 ENCOUNTER — PATIENT MESSAGE (OUTPATIENT)
Dept: SPINE | Facility: CLINIC | Age: 53
End: 2023-08-16

## 2023-08-16 VITALS — BODY MASS INDEX: 25.22 KG/M2 | WEIGHT: 160.69 LBS | HEIGHT: 67 IN

## 2023-08-16 DIAGNOSIS — M54.50 CHRONIC BILATERAL LOW BACK PAIN WITHOUT SCIATICA: Primary | ICD-10-CM

## 2023-08-16 DIAGNOSIS — G89.29 CHRONIC BILATERAL LOW BACK PAIN WITHOUT SCIATICA: Primary | ICD-10-CM

## 2023-08-16 DIAGNOSIS — M47.896 OTHER SPONDYLOSIS, LUMBAR REGION: Primary | ICD-10-CM

## 2023-08-16 DIAGNOSIS — M54.2 CERVICALGIA: ICD-10-CM

## 2023-08-16 DIAGNOSIS — M54.50 CHRONIC BILATERAL LOW BACK PAIN WITHOUT SCIATICA: ICD-10-CM

## 2023-08-16 DIAGNOSIS — G89.29 CHRONIC BILATERAL LOW BACK PAIN WITHOUT SCIATICA: ICD-10-CM

## 2023-08-16 PROCEDURE — 72050 X-RAY EXAM NECK SPINE 4/5VWS: CPT | Mod: TC

## 2023-08-16 PROCEDURE — 99213 PR OFFICE/OUTPT VISIT, EST, LEVL III, 20-29 MIN: ICD-10-PCS | Mod: S$GLB,,, | Performed by: PHYSICAL MEDICINE & REHABILITATION

## 2023-08-16 PROCEDURE — 1159F PR MEDICATION LIST DOCUMENTED IN MEDICAL RECORD: ICD-10-PCS | Mod: CPTII,S$GLB,, | Performed by: PHYSICAL MEDICINE & REHABILITATION

## 2023-08-16 PROCEDURE — 3008F BODY MASS INDEX DOCD: CPT | Mod: CPTII,S$GLB,, | Performed by: PHYSICAL MEDICINE & REHABILITATION

## 2023-08-16 PROCEDURE — 1159F MED LIST DOCD IN RCRD: CPT | Mod: CPTII,S$GLB,, | Performed by: PHYSICAL MEDICINE & REHABILITATION

## 2023-08-16 PROCEDURE — 99213 OFFICE O/P EST LOW 20 MIN: CPT | Mod: S$GLB,,, | Performed by: PHYSICAL MEDICINE & REHABILITATION

## 2023-08-16 PROCEDURE — 72050 X-RAY EXAM NECK SPINE 4/5VWS: CPT | Mod: 26,,, | Performed by: RADIOLOGY

## 2023-08-16 PROCEDURE — 3008F PR BODY MASS INDEX (BMI) DOCUMENTED: ICD-10-PCS | Mod: CPTII,S$GLB,, | Performed by: PHYSICAL MEDICINE & REHABILITATION

## 2023-08-16 PROCEDURE — 72114 X-RAY EXAM L-S SPINE BENDING: CPT | Mod: TC

## 2023-08-16 PROCEDURE — 1160F RVW MEDS BY RX/DR IN RCRD: CPT | Mod: CPTII,S$GLB,, | Performed by: PHYSICAL MEDICINE & REHABILITATION

## 2023-08-16 PROCEDURE — 72050 XR CERVICAL SPINE AP LAT WITH FLEX EXTEN: ICD-10-PCS | Mod: 26,,, | Performed by: RADIOLOGY

## 2023-08-16 PROCEDURE — 1160F PR REVIEW ALL MEDS BY PRESCRIBER/CLIN PHARMACIST DOCUMENTED: ICD-10-PCS | Mod: CPTII,S$GLB,, | Performed by: PHYSICAL MEDICINE & REHABILITATION

## 2023-08-16 PROCEDURE — 72114 XR LUMBAR SPINE 5 VIEW WITH FLEX AND EXT: ICD-10-PCS | Mod: 26,,, | Performed by: RADIOLOGY

## 2023-08-16 PROCEDURE — 72114 X-RAY EXAM L-S SPINE BENDING: CPT | Mod: 26,,, | Performed by: RADIOLOGY

## 2023-08-16 NOTE — TELEPHONE ENCOUNTER
Scheduled for 09/14/2023 instructions weere given in office this is not MBB it is facet inj with steroid.

## 2023-08-16 NOTE — H&P (VIEW-ONLY)
SUBJECTIVE:    Patient ID: Dayo Cortes is a 52 y.o. female.    Chief Complaint: Follow-up    This is a 52-year-old woman I have seen in the past with complaints of low back pain.  She had an excellent response to facet joint injection bilaterally L4-5 and L5-S1 on 11/17/2020 with Dr. Allen.  She reports greater than 80% improvement in her pain symptoms with improved functional mobility.  The pain relief lasted for roughly 1 year.  Since that time she is been having intermittent bouts of low back pain which were manageable with over-the-counter medications but for the past 2 months she is had persistent low back pain at the lumbosacral junction.  No radicular symptoms.  Secondary complaint neck pain.  She had posterior headaches for couple of weeks but those are getting better with muscle relaxers prescribed by primary care.  Pain level is 6-7 out of 10 and interferes with her occupation and activities of daily living.  Bowel and bladder are stable.  She has known cervical and lumbar degenerative disc disease          Past Medical History:   Diagnosis Date    Abnormal Pap smear 2006    once but repeated pap smear and the resulted WNLs.    Abnormal Pap smear of cervix     early 30 's became normal    GERD (gastroesophageal reflux disease)      Social History     Socioeconomic History    Marital status: Single   Tobacco Use    Smoking status: Never    Smokeless tobacco: Never   Substance and Sexual Activity    Alcohol use: Yes     Comment: occasional    Drug use: No    Sexual activity: Yes     Partners: Male     Birth control/protection: Condom     Social Determinants of Health     Physical Activity: Unknown (11/3/2022)    Exercise Vital Sign     Days of Exercise per Week: 3 days   Stress: No Stress Concern Present (12/11/2020)    [employer] - Occupational Stress Questionnaire     Feeling of Stress : Only a little   Housing Stability: Unknown (11/3/2022)    Housing Stability Vital Sign  "    Unable to Pay for Housing in the Last Year: Patient refused     Unstable Housing in the Last Year: Patient refused     Past Surgical History:   Procedure Laterality Date    COLONOSCOPY N/A 6/7/2017    Procedure: COLONOSCOPY;  Surgeon: Win Rodriguez MD;  Location: [facility];  Service: Endoscopy;  Laterality: N/A;    EXCISION OF PREPATELLAR BURSA Right 4/28/2023    Procedure: EXCISION, PREPATELLAR BURSA;  Surgeon: Bertram Garcia II, MD;  Location: [facility] OR;  Service: Orthopedics;  Laterality: Right;    HYSTERECTOMY      LUMBAR PARAVERTEBRAL FACET JOINT NERVE BLOCK Bilateral 11/17/2020    Procedure: facet joint injection;  Surgeon: Sina Allen MD;  Location: [facility] OR;  Service: Pain Management;  Laterality: Bilateral;  L4-5, L5-S1     Family History   Problem Relation Age of Onset    Breast cancer Mother 40        unconfirmed.    Diabetes Maternal Grandmother     Thyroid disease Maternal Aunt         nodules and hashimotos    Ovarian cancer Neg Hx     Colon cancer Neg Hx      Vitals:    08/16/23 0851   Weight: 72.9 kg (160 lb 11.5 oz)   Height: 5' 6.75" (1.695 m)       Review of Systems   Constitutional:  Negative for chills, diaphoresis, fatigue, fever and unexpected weight change.   HENT:  Negative for trouble swallowing.    Eyes:  Negative for visual disturbance.   Respiratory:  Negative for shortness of breath.    Cardiovascular:  Negative for chest pain.   Gastrointestinal:  Negative for abdominal pain, constipation, nausea and vomiting.   Genitourinary:  Negative for difficulty urinating.   Musculoskeletal:  Negative for arthralgias, back pain, gait problem, joint swelling, myalgias, neck pain and neck stiffness.   Neurological:  Negative for dizziness, speech difficulty, weakness, light-headedness, numbness and headaches.          Objective:      Physical Exam  Neurological:      Mental Status: She is alert and oriented to person, place, and time.      Comments: She is a fit-appearing woman in no acute " distress  No point tenderness or palpable masses about the cervical or lumbar spine  Forward flexion of the lumbar spine is to about 60° before she complains of pain at the lumbosacral junction.  Extension of the lumbar spine causes mild pain at the lumbosacral junction  She can heel and toe walk normally  Reflexes- +1-+2 reflexes at the following:   C5-Biceps   C6-Brachioradialis   C7-Triceps   L3/4-Patellar   S1-Achilles   Luisa sign negative bilaterally  Strength testing- 5/5 strength in the following muscle groups:  C5-Elbow flexion  C6-Wrist extension  C7-Elbow extension  C8-Finger flexion  T1-Finger abduction  L2-Hip flexion  L3-Knee extension  L4-Ankle dorsiflexion  L5-Great toe extension  S1-Ankle plantar flexion                    Assessment:       1. Chronic bilateral low back pain without sciatica    2. Cervicalgia           Plan:     She remains neurologically intact.  I suspect she has ongoing low back pain on basis of degenerative disc disease and facet arthropathy.  Historically favorable response to facet joint injections.  I recommend repeating that procedure.  We will get some updated x-rays on the cervical and lumbar spine.  Consider adding physical therapy for neck pain.  Follow-up with me after the procedure      Chronic bilateral low back pain without sciatica  -     X-Ray Lumbar Complete Including Flex And Ext; Future; Expected date: 08/16/2023    Cervicalgia  -     X-Ray Cervical Spine AP Lat with Flex Ex; Future; Expected date: 08/16/2023

## 2023-08-16 NOTE — PROGRESS NOTES
SUBJECTIVE:    Patient ID: Dayo Cortes is a 52 y.o. female.    Chief Complaint: Follow-up    This is a 52-year-old woman I have seen in the past with complaints of low back pain.  She had an excellent response to facet joint injection bilaterally L4-5 and L5-S1 on 11/17/2020 with Dr. Allen.  She reports greater than 80% improvement in her pain symptoms with improved functional mobility.  The pain relief lasted for roughly 1 year.  Since that time she is been having intermittent bouts of low back pain which were manageable with over-the-counter medications but for the past 2 months she is had persistent low back pain at the lumbosacral junction.  No radicular symptoms.  Secondary complaint neck pain.  She had posterior headaches for couple of weeks but those are getting better with muscle relaxers prescribed by primary care.  Pain level is 6-7 out of 10 and interferes with her occupation and activities of daily living.  Bowel and bladder are stable.  She has known cervical and lumbar degenerative disc disease          Past Medical History:   Diagnosis Date    Abnormal Pap smear 2006    once but repeated pap smear and the resulted WNLs.    Abnormal Pap smear of cervix     early 30 's became normal    GERD (gastroesophageal reflux disease)      Social History     Socioeconomic History    Marital status: Single   Tobacco Use    Smoking status: Never    Smokeless tobacco: Never   Substance and Sexual Activity    Alcohol use: Yes     Comment: occasional    Drug use: No    Sexual activity: Yes     Partners: Male     Birth control/protection: Condom     Social Determinants of Health     Physical Activity: Unknown (11/3/2022)    Exercise Vital Sign     Days of Exercise per Week: 3 days   Stress: No Stress Concern Present (12/11/2020)    [employer] - Occupational Stress Questionnaire     Feeling of Stress : Only a little   Housing Stability: Unknown (11/3/2022)    Housing Stability Vital Sign  "    Unable to Pay for Housing in the Last Year: Patient refused     Unstable Housing in the Last Year: Patient refused     Past Surgical History:   Procedure Laterality Date    COLONOSCOPY N/A 6/7/2017    Procedure: COLONOSCOPY;  Surgeon: Win Rodriguez MD;  Location: [facility];  Service: Endoscopy;  Laterality: N/A;    EXCISION OF PREPATELLAR BURSA Right 4/28/2023    Procedure: EXCISION, PREPATELLAR BURSA;  Surgeon: Bertram Garcia II, MD;  Location: [facility] OR;  Service: Orthopedics;  Laterality: Right;    HYSTERECTOMY      LUMBAR PARAVERTEBRAL FACET JOINT NERVE BLOCK Bilateral 11/17/2020    Procedure: facet joint injection;  Surgeon: Sina Allen MD;  Location: [facility] OR;  Service: Pain Management;  Laterality: Bilateral;  L4-5, L5-S1     Family History   Problem Relation Age of Onset    Breast cancer Mother 40        unconfirmed.    Diabetes Maternal Grandmother     Thyroid disease Maternal Aunt         nodules and hashimotos    Ovarian cancer Neg Hx     Colon cancer Neg Hx      Vitals:    08/16/23 0851   Weight: 72.9 kg (160 lb 11.5 oz)   Height: 5' 6.75" (1.695 m)       Review of Systems   Constitutional:  Negative for chills, diaphoresis, fatigue, fever and unexpected weight change.   HENT:  Negative for trouble swallowing.    Eyes:  Negative for visual disturbance.   Respiratory:  Negative for shortness of breath.    Cardiovascular:  Negative for chest pain.   Gastrointestinal:  Negative for abdominal pain, constipation, nausea and vomiting.   Genitourinary:  Negative for difficulty urinating.   Musculoskeletal:  Negative for arthralgias, back pain, gait problem, joint swelling, myalgias, neck pain and neck stiffness.   Neurological:  Negative for dizziness, speech difficulty, weakness, light-headedness, numbness and headaches.          Objective:      Physical Exam  Neurological:      Mental Status: She is alert and oriented to person, place, and time.      Comments: She is a fit-appearing woman in no acute " distress  No point tenderness or palpable masses about the cervical or lumbar spine  Forward flexion of the lumbar spine is to about 60° before she complains of pain at the lumbosacral junction.  Extension of the lumbar spine causes mild pain at the lumbosacral junction  She can heel and toe walk normally  Reflexes- +1-+2 reflexes at the following:   C5-Biceps   C6-Brachioradialis   C7-Triceps   L3/4-Patellar   S1-Achilles   Luisa sign negative bilaterally  Strength testing- 5/5 strength in the following muscle groups:  C5-Elbow flexion  C6-Wrist extension  C7-Elbow extension  C8-Finger flexion  T1-Finger abduction  L2-Hip flexion  L3-Knee extension  L4-Ankle dorsiflexion  L5-Great toe extension  S1-Ankle plantar flexion                    Assessment:       1. Chronic bilateral low back pain without sciatica    2. Cervicalgia           Plan:     She remains neurologically intact.  I suspect she has ongoing low back pain on basis of degenerative disc disease and facet arthropathy.  Historically favorable response to facet joint injections.  I recommend repeating that procedure.  We will get some updated x-rays on the cervical and lumbar spine.  Consider adding physical therapy for neck pain.  Follow-up with me after the procedure      Chronic bilateral low back pain without sciatica  -     X-Ray Lumbar Complete Including Flex And Ext; Future; Expected date: 08/16/2023    Cervicalgia  -     X-Ray Cervical Spine AP Lat with Flex Ex; Future; Expected date: 08/16/2023

## 2023-08-16 NOTE — TELEPHONE ENCOUNTER
----- Message from Fabricio Narayanan MD sent at 8/16/2023  9:07 AM CDT -----  Please schedule for facet joint injection bilaterally L4-5 and L5-S1

## 2023-08-28 ENCOUNTER — TELEPHONE (OUTPATIENT)
Dept: PAIN MEDICINE | Facility: CLINIC | Age: 53
End: 2023-08-28

## 2023-08-28 NOTE — TELEPHONE ENCOUNTER
----- Message from Tita Jimenez sent at 8/28/2023 10:57 AM CDT -----  Regarding: Needs return call  Type: Needs Medical Advice  Who Called:  Dayo    Hossein Call Back Number: [phone]    Additional Information: Pt has procedure on 9/14 and was trying to figure out what her out of pocket would be please advise.

## 2023-09-11 ENCOUNTER — PATIENT MESSAGE (OUTPATIENT)
Dept: ADMINISTRATIVE | Facility: HOSPITAL | Age: 53
End: 2023-09-11

## 2023-09-12 ENCOUNTER — TELEPHONE (OUTPATIENT)
Dept: PAIN MEDICINE | Facility: CLINIC | Age: 53
End: 2023-09-12

## 2023-09-12 RX ORDER — NAPROXEN SODIUM 220 MG
220 TABLET ORAL
COMMUNITY

## 2023-09-14 ENCOUNTER — HOSPITAL ENCOUNTER (OUTPATIENT)
Facility: HOSPITAL | Age: 53
Discharge: HOME | End: 2023-09-14
Attending: ANESTHESIOLOGY | Admitting: ANESTHESIOLOGY
Payer: COMMERCIAL

## 2023-09-14 DIAGNOSIS — M47.896 OTHER SPONDYLOSIS, LUMBAR REGION: ICD-10-CM

## 2023-09-14 PROCEDURE — 64494 PR INJ DX/THER AGNT PARAVERT FACET JOINT,IMG GUIDE,LUMBAR/SAC, 2ND LEVEL: ICD-10-PCS | Mod: 50,,, | Performed by: ANESTHESIOLOGY

## 2023-09-14 PROCEDURE — 63600175 PHARM REV CODE 636 W HCPCS: Performed by: ANESTHESIOLOGY

## 2023-09-14 PROCEDURE — 25000003 PHARM REV CODE 250: Performed by: ANESTHESIOLOGY

## 2023-09-14 PROCEDURE — 64493 PR INJ DX/THER AGNT PARAVERT FACET JOINT,IMG GUIDE,LUMBAR/SAC,1ST LVL: ICD-10-PCS | Mod: 50,,, | Performed by: ANESTHESIOLOGY

## 2023-09-14 PROCEDURE — PAINCODE PAIN MANAGEMENT PROCEDURE PLACEHOLDER CHARGE: Performed by: ANESTHESIOLOGY

## 2023-09-14 PROCEDURE — 64494 INJ PARAVERT F JNT L/S 2 LEV: CPT | Mod: 50,,, | Performed by: ANESTHESIOLOGY

## 2023-09-14 PROCEDURE — 64493 INJ PARAVERT F JNT L/S 1 LEV: CPT | Mod: 50,,, | Performed by: ANESTHESIOLOGY

## 2023-09-14 PROCEDURE — 36000704 HC OR TIME LEV I 1ST 15 MIN: Performed by: ANESTHESIOLOGY

## 2023-09-14 PROCEDURE — 25500020 PHARM REV CODE 255: Performed by: ANESTHESIOLOGY

## 2023-09-14 PROCEDURE — 64494 INJ PARAVERT F JNT L/S 2 LEV: CPT | Mod: 50 | Performed by: ANESTHESIOLOGY

## 2023-09-14 PROCEDURE — 64493 INJ PARAVERT F JNT L/S 1 LEV: CPT | Mod: 50 | Performed by: ANESTHESIOLOGY

## 2023-09-14 RX ORDER — SODIUM CHLORIDE, SODIUM LACTATE, POTASSIUM CHLORIDE, CALCIUM CHLORIDE 600; 310; 30; 20 MG/100ML; MG/100ML; MG/100ML; MG/100ML
INJECTION, SOLUTION INTRAVENOUS CONTINUOUS
Status: ACTIVE | OUTPATIENT
Start: 2023-09-14

## 2023-09-14 RX ORDER — LIDOCAINE HYDROCHLORIDE 10 MG/ML
INJECTION INFILTRATION; PERINEURAL
Status: DISCONTINUED | OUTPATIENT
Start: 2023-09-14 | End: 2023-09-14 | Stop reason: HOSPADM

## 2023-09-14 RX ORDER — DEXAMETHASONE SODIUM PHOSPHATE 100 MG/10ML
INJECTION INTRAMUSCULAR; INTRAVENOUS
Status: DISCONTINUED | OUTPATIENT
Start: 2023-09-14 | End: 2023-09-14 | Stop reason: HOSPADM

## 2023-09-14 RX ORDER — LIDOCAINE HYDROCHLORIDE 10 MG/ML
1 INJECTION, SOLUTION EPIDURAL; INFILTRATION; INTRACAUDAL; PERINEURAL ONCE
Status: ACTIVE | OUTPATIENT
Start: 2023-09-14

## 2023-09-14 RX ORDER — BUPIVACAINE HYDROCHLORIDE 2.5 MG/ML
INJECTION, SOLUTION EPIDURAL; INFILTRATION; INTRACAUDAL
Status: DISCONTINUED | OUTPATIENT
Start: 2023-09-14 | End: 2023-09-14 | Stop reason: HOSPADM

## 2023-09-14 RX ORDER — MIDAZOLAM HYDROCHLORIDE 1 MG/ML
INJECTION, SOLUTION INTRAMUSCULAR; INTRAVENOUS
Status: DISCONTINUED | OUTPATIENT
Start: 2023-09-14 | End: 2023-09-14 | Stop reason: HOSPADM

## 2023-09-14 RX ORDER — FENTANYL CITRATE 50 UG/ML
INJECTION, SOLUTION INTRAMUSCULAR; INTRAVENOUS
Status: DISCONTINUED | OUTPATIENT
Start: 2023-09-14 | End: 2023-09-14 | Stop reason: HOSPADM

## 2023-09-14 RX ADMIN — SODIUM CHLORIDE, POTASSIUM CHLORIDE, SODIUM LACTATE AND CALCIUM CHLORIDE: 600; 310; 30; 20 INJECTION, SOLUTION INTRAVENOUS at 09:09

## 2023-09-14 NOTE — DISCHARGE SUMMARY
[facility]  Discharge Note  Short Stay    Procedure(s) (LRB):  BLOCK, NERVE, FACET JOINT, LUMBAR (Bilateral)      OUTCOME: Patient tolerated treatment/procedure well without complication and is now ready for discharge.    DISPOSITION: Home or Self Care    FINAL DIAGNOSIS:  <principal problem not specified>    FOLLOWUP: In clinic    DISCHARGE INSTRUCTIONS:    Discharge Procedure Orders   Notify your health care provider if you experience any of the following:  temperature >100.4     Notify your health care provider if you experience any of the following:  severe uncontrolled pain     Notify your health care provider if you experience any of the following:  redness, tenderness, or signs of infection (pain, swelling, redness, odor or green/yellow discharge around incision site)     Activity as tolerated        TIME SPENT ON DISCHARGE: 30 minutes

## 2023-09-15 VITALS
RESPIRATION RATE: 20 BRPM | WEIGHT: 160 LBS | BODY MASS INDEX: 25.71 KG/M2 | HEIGHT: 66 IN | SYSTOLIC BLOOD PRESSURE: 130 MMHG | DIASTOLIC BLOOD PRESSURE: 79 MMHG | OXYGEN SATURATION: 100 % | TEMPERATURE: 97 F | HEART RATE: 63 BPM

## 2023-09-20 ENCOUNTER — PATIENT OUTREACH (OUTPATIENT)
Dept: ADMINISTRATIVE | Facility: HOSPITAL | Age: 53
End: 2023-09-20

## 2023-09-20 ENCOUNTER — OFFICE VISIT (OUTPATIENT)
Dept: FAMILY MEDICINE | Facility: CLINIC | Age: 53
End: 2023-09-20
Payer: COMMERCIAL

## 2023-09-20 VITALS
BODY MASS INDEX: 25.26 KG/M2 | HEART RATE: 87 BPM | OXYGEN SATURATION: 99 % | WEIGHT: 157.19 LBS | HEIGHT: 66 IN | TEMPERATURE: 98 F | RESPIRATION RATE: 17 BRPM | SYSTOLIC BLOOD PRESSURE: 132 MMHG | DIASTOLIC BLOOD PRESSURE: 88 MMHG

## 2023-09-20 DIAGNOSIS — Z12.39 ENCOUNTER FOR SCREENING FOR MALIGNANT NEOPLASM OF BREAST, UNSPECIFIED SCREENING MODALITY: ICD-10-CM

## 2023-09-20 DIAGNOSIS — Z00.00 HEALTHCARE MAINTENANCE: Primary | ICD-10-CM

## 2023-09-20 DIAGNOSIS — M54.2 NECK PAIN: ICD-10-CM

## 2023-09-20 PROCEDURE — 99396 PR PREVENTIVE VISIT,EST,40-64: ICD-10-PCS | Mod: S$GLB,,, | Performed by: FAMILY MEDICINE

## 2023-09-20 PROCEDURE — 1159F MED LIST DOCD IN RCRD: CPT | Mod: CPTII,S$GLB,, | Performed by: FAMILY MEDICINE

## 2023-09-20 PROCEDURE — 99999 PR PBB SHADOW E&M-EST. PATIENT-LVL IV: CPT | Mod: PBBFAC,,, | Performed by: FAMILY MEDICINE

## 2023-09-20 PROCEDURE — 3008F BODY MASS INDEX DOCD: CPT | Mod: CPTII,S$GLB,, | Performed by: FAMILY MEDICINE

## 2023-09-20 PROCEDURE — 3075F SYST BP GE 130 - 139MM HG: CPT | Mod: CPTII,S$GLB,, | Performed by: FAMILY MEDICINE

## 2023-09-20 PROCEDURE — 3079F DIAST BP 80-89 MM HG: CPT | Mod: CPTII,S$GLB,, | Performed by: FAMILY MEDICINE

## 2023-09-20 PROCEDURE — 3044F HG A1C LEVEL LT 7.0%: CPT | Mod: CPTII,S$GLB,, | Performed by: FAMILY MEDICINE

## 2023-09-20 PROCEDURE — 3008F PR BODY MASS INDEX (BMI) DOCUMENTED: ICD-10-PCS | Mod: CPTII,S$GLB,, | Performed by: FAMILY MEDICINE

## 2023-09-20 PROCEDURE — 1159F PR MEDICATION LIST DOCUMENTED IN MEDICAL RECORD: ICD-10-PCS | Mod: CPTII,S$GLB,, | Performed by: FAMILY MEDICINE

## 2023-09-20 PROCEDURE — 3075F PR MOST RECENT SYSTOLIC BLOOD PRESS GE 130-139MM HG: ICD-10-PCS | Mod: CPTII,S$GLB,, | Performed by: FAMILY MEDICINE

## 2023-09-20 PROCEDURE — 3044F PR MOST RECENT HEMOGLOBIN A1C LEVEL <7.0%: ICD-10-PCS | Mod: CPTII,S$GLB,, | Performed by: FAMILY MEDICINE

## 2023-09-20 PROCEDURE — 99396 PREV VISIT EST AGE 40-64: CPT | Mod: S$GLB,,, | Performed by: FAMILY MEDICINE

## 2023-09-20 PROCEDURE — 99999 PR PBB SHADOW E&M-EST. PATIENT-LVL IV: ICD-10-PCS | Mod: PBBFAC,,, | Performed by: FAMILY MEDICINE

## 2023-09-20 PROCEDURE — 3079F PR MOST RECENT DIASTOLIC BLOOD PRESSURE 80-89 MM HG: ICD-10-PCS | Mod: CPTII,S$GLB,, | Performed by: FAMILY MEDICINE

## 2023-09-20 RX ORDER — NAPROXEN 500 MG/1
500 TABLET ORAL 2 TIMES DAILY PRN
Qty: 60 TABLET | Refills: 5 | Status: SHIPPED | OUTPATIENT
Start: 2023-09-20

## 2023-09-21 ENCOUNTER — LAB VISIT (OUTPATIENT)
Dept: LAB | Facility: HOSPITAL | Age: 53
End: 2023-09-21
Attending: FAMILY MEDICINE
Payer: COMMERCIAL

## 2023-09-21 DIAGNOSIS — Z00.00 HEALTHCARE MAINTENANCE: ICD-10-CM

## 2023-09-21 LAB
ALBUMIN SERPL BCP-MCNC: 3.6 G/DL (ref 3.5–5.2)
ALP SERPL-CCNC: 51 U/L (ref 55–135)
ALT SERPL W/O P-5'-P-CCNC: 18 U/L (ref 10–44)
ANION GAP SERPL CALC-SCNC: 7 MMOL/L (ref 8–16)
AST SERPL-CCNC: 19 U/L (ref 10–40)
BILIRUB SERPL-MCNC: 0.4 MG/DL (ref 0.1–1)
BUN SERPL-MCNC: 14 MG/DL (ref 6–20)
CALCIUM SERPL-MCNC: 8.6 MG/DL (ref 8.7–10.5)
CHLORIDE SERPL-SCNC: 107 MMOL/L (ref 95–110)
CHOLEST SERPL-MCNC: 199 MG/DL (ref 120–199)
CHOLEST/HDLC SERPL: 2.6 {RATIO} (ref 2–5)
CO2 SERPL-SCNC: 25 MMOL/L (ref 23–29)
CREAT SERPL-MCNC: 0.8 MG/DL (ref 0.5–1.4)
EST. GFR  (NO RACE VARIABLE): >60 ML/MIN/1.73 M^2
ESTIMATED AVG GLUCOSE: 103 MG/DL (ref 68–131)
GLUCOSE SERPL-MCNC: 76 MG/DL (ref 70–110)
HBA1C MFR BLD: 5.2 % (ref 4–5.6)
HDLC SERPL-MCNC: 78 MG/DL (ref 40–75)
HDLC SERPL: 39.2 % (ref 20–50)
HGB BLD-MCNC: 12.1 G/DL (ref 12–16)
LDLC SERPL CALC-MCNC: 110.2 MG/DL (ref 63–159)
MPV, BLUE TOP: 9.2 FL (ref 9.2–12.9)
NONHDLC SERPL-MCNC: 121 MG/DL
PLATELET, BLUE TOP: 216 K/UL (ref 150–450)
POTASSIUM SERPL-SCNC: 3.8 MMOL/L (ref 3.5–5.1)
PROT SERPL-MCNC: 6.4 G/DL (ref 6–8.4)
SODIUM SERPL-SCNC: 139 MMOL/L (ref 136–145)
TRIGL SERPL-MCNC: 54 MG/DL (ref 30–150)
WBC # BLD AUTO: 4.71 K/UL (ref 3.9–12.7)

## 2023-09-21 PROCEDURE — 80061 LIPID PANEL: CPT | Performed by: FAMILY MEDICINE

## 2023-09-21 PROCEDURE — 36415 COLL VENOUS BLD VENIPUNCTURE: CPT | Mod: PO | Performed by: FAMILY MEDICINE

## 2023-09-21 PROCEDURE — 85049 AUTOMATED PLATELET COUNT: CPT | Performed by: FAMILY MEDICINE

## 2023-09-21 PROCEDURE — 86038 ANTINUCLEAR ANTIBODIES: CPT | Performed by: FAMILY MEDICINE

## 2023-09-21 PROCEDURE — 85018 HEMOGLOBIN: CPT | Performed by: FAMILY MEDICINE

## 2023-09-21 PROCEDURE — 83036 HEMOGLOBIN GLYCOSYLATED A1C: CPT | Performed by: FAMILY MEDICINE

## 2023-09-21 PROCEDURE — 85048 AUTOMATED LEUKOCYTE COUNT: CPT | Performed by: FAMILY MEDICINE

## 2023-09-21 PROCEDURE — 80053 COMPREHEN METABOLIC PANEL: CPT | Performed by: FAMILY MEDICINE

## 2023-09-22 LAB — ANA SER QL IF: NORMAL

## 2023-09-26 NOTE — PROGRESS NOTES
Subjective:   Patient ID: Dayo Cortes is a 52 y.o. female     Chief Complaint:Annual Exam      Here for checkup      Review of Systems   Constitutional:  Negative for chills and fever.   HENT:  Negative for sore throat and trouble swallowing.    Respiratory:  Negative for cough and shortness of breath.    Cardiovascular:  Negative for chest pain and leg swelling.   Gastrointestinal:  Negative for abdominal distention and abdominal pain.   Genitourinary:  Negative for dysuria and flank pain.   Musculoskeletal:  Negative for arthralgias and back pain.   Skin:  Negative for color change and pallor.   Neurological:  Negative for weakness and headaches.   Psychiatric/Behavioral:  Negative for agitation and confusion.      Past Medical History:   Diagnosis Date    Abnormal Pap smear 2006    once but repeated pap smear and the resulted WNLs.    Abnormal Pap smear of cervix     early 30 's became normal    GERD (gastroesophageal reflux disease)      Past Surgical History:   Procedure Laterality Date    COLONOSCOPY N/A 6/7/2017    Procedure: COLONOSCOPY;  Surgeon: Win Rodriguez MD;  Location: [facility];  Service: Endoscopy;  Laterality: N/A;    EXCISION OF PREPATELLAR BURSA Right 4/28/2023    Procedure: EXCISION, PREPATELLAR BURSA;  Surgeon: Bertram Garcia II, MD;  Location: [facility] OR;  Service: Orthopedics;  Laterality: Right;    HYSTERECTOMY      LUMBAR PARAVERTEBRAL FACET JOINT NERVE BLOCK Bilateral 11/17/2020    Procedure: facet joint injection;  Surgeon: Sina Allen MD;  Location: [facility] OR;  Service: Pain Management;  Laterality: Bilateral;  L4-5, L5-S1    LUMBAR PARAVERTEBRAL FACET JOINT NERVE BLOCK Bilateral 9/14/2023    Procedure: BLOCK, NERVE, FACET JOINT, LUMBAR;  Surgeon: Sina Allen MD;  Location: [facility] OR;  Service: Anesthesiology;  Laterality: Bilateral;  L4-5 and l5-s1 (not MBB facet injections)     Objective:     Vitals:    09/20/23 1052   BP: 132/88   Pulse: 87   Resp: 17   Temp: 98 °F (36.7 °C)      Body mass index is 25.37 kg/m².  Physical Exam  Vitals and nursing note reviewed.   Constitutional:       Appearance: She is well-developed.   HENT:      Head: Normocephalic and atraumatic.   Eyes:      General: No scleral icterus.     Conjunctiva/sclera: Conjunctivae normal.   Cardiovascular:      Heart sounds: No murmur heard.  Pulmonary:      Effort: Pulmonary effort is normal. No respiratory distress.   Musculoskeletal:         General: No deformity. Normal range of motion.      Cervical back: Normal range of motion and neck supple.   Skin:     Coloration: Skin is not pale.      Findings: No rash.   Neurological:      Mental Status: She is alert and oriented to person, place, and time.   Psychiatric:         Behavior: Behavior normal.         Thought Content: Thought content normal.         Judgment: Judgment normal.       Assessment:     1. Healthcare maintenance    2. Neck pain    3. Encounter for screening for malignant neoplasm of breast, unspecified screening modality      Plan:   Healthcare maintenance  -     ANGIE; Future; Expected date: 09/20/2023  -     Comprehensive Metabolic Panel; Future; Expected date: 09/20/2023  -     Hemoglobin; Future; Expected date: 09/20/2023  -     Hemoglobin A1C; Future; Expected date: 09/20/2023  -     WBC; Future; Expected date: 09/20/2023  -     Platelet Count, Blue Top; Future; Expected date: 09/20/2023  -     Lipid Panel; Future; Expected date: 09/20/2023    Neck pain  -     Ambulatory referral/consult to Physical/Occupational Therapy; Future; Expected date: 09/27/2023  -     naproxen (NAPROSYN) 500 MG tablet; Take 1 tablet (500 mg total) by mouth 2 (two) times daily as needed.  Dispense: 60 tablet; Refill: 5    Encounter for screening for malignant neoplasm of breast, unspecified screening modality  -     Mammo Digital Screening Bilat w/ Jacques; Future; Expected date: 09/20/2023            Established patient with me has been instructed that must see me at least 1 time  yearly (every 365 days) for refills of medications. Seeing other providers in this clinic is fine but expectation is to see me yearly.    Stoney Stack MD  09/26/2023    Portions of this note have been dictated with yelena Archer

## 2023-10-05 ENCOUNTER — CLINICAL SUPPORT (OUTPATIENT)
Dept: REHABILITATION | Facility: HOSPITAL | Age: 53
End: 2023-10-05
Attending: FAMILY MEDICINE

## 2023-10-05 ENCOUNTER — TELEPHONE (OUTPATIENT)
Dept: FAMILY MEDICINE | Facility: CLINIC | Age: 53
End: 2023-10-05

## 2023-10-05 ENCOUNTER — HOSPITAL ENCOUNTER (OUTPATIENT)
Dept: RADIOLOGY | Facility: CLINIC | Age: 53
Discharge: HOME | End: 2023-10-05
Attending: FAMILY MEDICINE
Payer: COMMERCIAL

## 2023-10-05 DIAGNOSIS — Z12.39 ENCOUNTER FOR SCREENING FOR MALIGNANT NEOPLASM OF BREAST, UNSPECIFIED SCREENING MODALITY: ICD-10-CM

## 2023-10-05 DIAGNOSIS — M54.2 NECK PAIN: ICD-10-CM

## 2023-10-05 DIAGNOSIS — R52 PAIN: Primary | ICD-10-CM

## 2023-10-05 PROCEDURE — 77067 MAMMO DIGITAL SCREENING BILAT WITH TOMO: ICD-10-PCS | Mod: 26,,, | Performed by: RADIOLOGY

## 2023-10-05 PROCEDURE — 77067 SCR MAMMO BI INCL CAD: CPT | Mod: 26,,, | Performed by: RADIOLOGY

## 2023-10-05 PROCEDURE — 77063 MAMMO DIGITAL SCREENING BILAT WITH TOMO: ICD-10-PCS | Mod: 26,,, | Performed by: RADIOLOGY

## 2023-10-05 PROCEDURE — 77067 SCR MAMMO BI INCL CAD: CPT | Mod: TC,PO

## 2023-10-05 PROCEDURE — 77063 BREAST TOMOSYNTHESIS BI: CPT | Mod: 26,,, | Performed by: RADIOLOGY

## 2023-10-05 NOTE — PLAN OF CARE
"[facility]  Physical Therapy Initial Evaluation    Name: Dayo Cortes  Clinic Number: [id number]    Therapy Diagnosis:***   Encounter Diagnosis   Name Primary?    Neck pain       Physician: Stoney Stack MD  {reminder to co-sign referring provider}  Physician Orders: {AMB PT KNEE ORDERS:97710::"PT Eval and Treat"}  Medical Diagnosis from Referral: ***  Evaluation Date: 10/5/2023  Authorization Period Expiration: ***  Plan of Care Expiration: ***    Progress Update: ***    Visit # / Visits authorized: 1 / ***    FOTO: Visit #1 - 1/3     PRECAUTIONS: {Precautions:82552::"Standard Precautions"}     MD Follow-up: ***    Time In: 1100  Time Out: 1200  Total Appointment Time (timed & untimed codes): 60 minutes    SUBJECTIVE     Date of onset: July     History of current condition - Dayo is a 52 y.o. female whom reports Temporal area, some headaches, started in July.   Aleve and muscle relaxer helps some. Emergency Room  a month ago due to constant headache. Difficulty with focusing.   Lauren current exercise regiment includes: Does Cross fit 3 times a week.  Seeking Physical Therapy for ***.    AYO: Gradual   Falls: None  Physician Instructions (per patient): ***  Other concerns: ***    Imaging: {Imagin::"X-RAY"}: ***    Prior Therapy: {YES_NA:89067}  Social History: Pt {LIVES WITH:18929}  Living Environment: ***  ADLs unable to complete: ***  Gym/Home Equipment: ***  Occupation: Pt is ***  Prior Level of Function: Independent with all ADLs  Current Level of Function: ***% of PLOF  - Sitting Tolerance: *** min  - Standing Tolerance: *** min  - Walking Tolerance: *** min  - Stair Tolerance: *** flights    Pain:  Current {0-10:::"0"} /10, worst {0-10:::"0"} /10, best {0-10:::"0"} /10   Location: ***  Description: {Pain Description:09899}  Aggravating Factors: ***  Easing Factors: ***    Pts goals: Pt reported goals are " ***    _______________________________________________________  Medical History:   Past Medical History:   Diagnosis Date    Abnormal Pap smear 2006    once but repeated pap smear and the resulted WNLs.    Abnormal Pap smear of cervix     early 30 's became normal    GERD (gastroesophageal reflux disease)        Surgical History:   Dayo Cortes  has a past surgical history that includes Colonoscopy (N/A, 6/7/2017); Hysterectomy; Lumbar paravertebral facet joint nerve block (Bilateral, 11/17/2020); Excision of prepatellar bursa (Right, 4/28/2023); and Lumbar paravertebral facet joint nerve block (Bilateral, 9/14/2023).    Medications:   Dayo has a current medication list which includes the following prescription(s): acyclovir, estradiol, l.acid/b.animalis,bifidum/fos, multivitamin, naproxen, naproxen sodium, and tizanidine, and the following Facility-Administered Medications: lactated ringers and lidocaine (pf) 10 mg/ml (1%).    Allergies:   Review of patient's allergies indicates:  No Known Allergies     OBJECTIVE     RANGE OF MOTION:    Cervical Right  (spine) Left     Pain/Dysfunction with Movement Goal   Cervical Flexion (60) ***   45  Initial: ***   Cervical Extension (90)    45  Initial: ***   Cervical Side Bending (45)    45  Initial: ***   Cervical Rotation (75)    60  Initial: ***           NEURO SCREEN:    Neuro Testing Right   Left   Details   Reflexes  Not tested  Not tested  ***   Dermatomes normal normal    Myotomes normal normal    Tone normal normal    Spasticity Not present Not present        FUNCTIONAL SCREEN:    Upper Extremity ROM Details STRENGTH Details   Shoulder *** No pain or discomfort Grossly ***/5 No pain or discomfort   Elbow  No Pain or discomfort Grossly ***/5 No pain or discomfort   Hand/Wrist  No pain or discomfort Grossly ***/5 No pain or discomfort           JOINT MOBILITY:     Joint Motion Tested Right  (spine)   Left    Goal   Cervical Mobility: C1-2 {JOINT MOBILITY:69393}  "{JOINT MOBILITY:31740} Normal B   Cervical Moiblity: C3-7 {JOINT MOBILITY:79182} {JOINT MOBILITY:23610} Normal B   Thoracic Mobility: T1-12 {JOINT MOBILITY:10647} {JOINT MOBILITY:49951} Normal B     SPECIAL TESTS:     Right  (spine)   Left    Goal   Compression {POSITIVE/NEGATIVE:29567} {POSITIVE/NEGATIVE:51892} Negative B    Distraction  {POSITIVE/NEGATIVE:73007} {POSITIVE/NEGATIVE:63644} Negative B    Spurlings {POSITIVE/NEGATIVE:23584} {POSITIVE/NEGATIVE:78829} Negative B      Sensation:  Sensation is {AMB PT KNEE SENSATION:75574::"intact to light touch"}    Palpation: Increased tone and tenderness noted with palpation to: ***    Posture:  Pt presents with postural abnormalities which include: ***    Gait Analysis: The patient ambulated with the following assistive device: {Assistive Device:42783::"none"}; the pt presents with the following gait abnormalities: decreased step length, jeramy, and arm swing; increased forward flexed posture, trunk sway - ***        Balance: Balance:    RIGHT   LEFT   Goal   Closed ***  60 seconds  Initial: R (***s), L (***s)   Tandem   20 seconds  Initial: R (***s), L (***s)   Single Leg   20 seconds  Initial: R (***s), L (***s)       FUNCTION:     CMS Impairment/Limitation/Restriction for FOTO *** Survey    Therapist reviewed FOTO scores for Dayo Cortes on 10/5/2023.   FOTO documents entered into [employer] - see Media section.    Limitation Score: ***%         TREATMENT     Total Treatment time separate from Evaluation: (***) minutes    Dayo received the treatments listed below:      THERAPEUTIC EXERCISES: to develop strength, endurance, ROM, flexibility, posture and core stabilization for (***)  minutes including: x = performed today    TherEx 10/5/2023    ROM/Chondral: ***                     BOLD = new this visit  Plan for Next Visit: ***       PATIENT EDUCATION AND HOME EXERCISES     Education/Self-Care provided:  (included in treatment) minutes   Patient educated on the " "impairments noted above and the effects of physical therapy intervention to improve overall condition and QOL.   Patient was educated on all the above exercise prior/during/after for proper posture, positioning, and execution for safe performance with home exercise program.   Exercise/Activity modifications:   10/5/2023: EVAL: ***    Written Home Exercises Provided: yes. Prefers: {printed vs electronic:54508}  Exercises were reviewed and Dayo was able to demonstrate them prior to the end of the session.  Dayo demonstrated good understanding of the education provided. See EMR under Patient Instructions for exercises provided during therapy sessions.    ASSESSMENT     Dayo is a 52 y.o. female referred to outpatient Physical Therapy with a medical diagnosis of ***.  Dayo presents with clinical signs and symptoms that support this diagnosis with ***.     decreased Cervical ROM, decreased upper extremity strength, Cervical joint(s) hypomobility, upper extremity neural tension, and impaired functional mobility. Radicular symptoms are present from the cervical spine down into the ***.    decreased shoulder girdle ROM, decreased scapular and shoulder strength, Glenohumeral joint hypomobility, and impaired functional mobility.    decreased lumbar ROM, decreased lower extremity strength (***),  lower extremity neural tension, and impaired functional mobility. Radicular symptoms are present from the lumbar spine down into the forefoot of his left lower extremity.    The above impairments will be addressed through manual therapy techniques, therapeutic exercises, functional training, and modalities as necessary. Patient was treated and educated on exercises for home program, progression of therapy, and benefits of therapy to achieve full functional mobility.     Pt prognosis is {REHAB PROGNOSIS OHS:48562::"Good"}.   Pt will benefit from skilled outpatient Physical Therapy to address the deficits stated above and in the " chart below, provide pt/family education, and to maximize pt's level of independence.     Plan of care discussed with patient: Yes  Pt's spiritual, cultural and educational needs considered and patient is agreeable to the plan of care and goals as stated below:     Anticipated Barriers for therapy: {barriers for care:26116}  Medical Necessity is demonstrated by the following  History  Co-morbidities and personal factors that may impact the plan of care [] LOW: no personal factors / co-morbidities  [] MODERATE: 1-2 personal factors / co-morbidities  [] HIGH: 3+ personal factors / co-morbidities    Moderate / High Support Documentation: ***     Examination  Body Structures and Functions, activity limitations and participation restrictions that may impact the plan of care [] LOW: addressing 1-2 elements  [] MODERATE: 3+ elements  [] HIGH: 4+ elements (please support below)    Moderate / High Support Documentation: ***     Clinical Presentation [] LOW: stable  [] MODERATE: Evolving  [] HIGH: Unstable     Decision Making/ Complexity Score: {Desc; low/moderate/high:868078}         GOALS:  SHORT TERM GOALS:  *** weeks  Progress Date met   Recent signs and systems trend is improving in order to progress towards LTG's. [] Met  [] Not Met  [] Progressing     Patient will be independent with HEP in order to further progress and return to maximal function. [] Met  [] Not Met  [] Progressing     Pain rating at Worst: 5/10 in order to progress towards increased independence with activity. [] Met  [] Not Met  [] Progressing     Patient will be able to correct postural deviations in sitting and standing, to decrease pain and promote postural awareness for injury prevention.  [] Met  [] Not Met  [] Progressing      [] Met  [] Not Met  [] Progressing        LONG TERM GOALS: *** weeks  Progress Date met   Patient will return to normal ADL, recreational, and work related activities with less pain and limitation.  [] Met  [] Not Met  []  "Progressing     Patient will improve AROM to stated goals in order to return to maximal functional potential.  [] Met  [] Not Met  [] Progressing     Patient will improve Strength to stated goals of appropriate musculature in order to improve functional independence.  [] Met  [] Not Met  [] Progressing     Pain Rating at Best: 1/10 to improve Quality of Life.  [] Met  [] Not Met  [] Progressing     Patient will meet predicted functional outcome (FOTO) score: ***% to increase self-worth & perceived functional ability. [] Met  [] Not Met  [] Progressing     Patient will have met/partially met personal goal of: *** [] Met  [] Not Met  [] Progressing      [] Met  [] Not Met  [] Progressing        PLAN   Plan of care Certification: 10/5/2023 to ***/2023    Outpatient Physical Therapy {NUMBERS 1-5:64556} times weekly for {NUMBERS 1-12:34941} weeks to include any combination of the following interventions: virtual visits, dry needling, modalities, electrical stimulation (IFC, Pre-Mod, Attended with Functional Dry Needling), {TX PLAN:59283::"Manual Therapy","Moist Heat/ Ice","Neuromuscular Re-ed","Patient Education","Self Care","Therapeutic Exercise","Functional Training","Therapeutic Activites"}     Thank you for this referral.    Guille Singh, PT      I CERTIFY THE NEED FOR THESE SERVICES FURNISHED UNDER THIS PLAN OF TREATMENT AND WHILE UNDER MY CARE   Physician's comments:     Physician's Signature: ___________________________________________________    "

## 2023-10-05 NOTE — TELEPHONE ENCOUNTER
----- Message from Guille Singh PT sent at 10/5/2023 12:55 PM CDT -----  Regarding: Acupuncture  Ms Cortes would rather do Acupuncture than Dry Needle,for more of a whole body approach. Will you put in a referral for Dry Needle please.

## 2023-10-06 NOTE — PROGRESS NOTES
Talked to patient only. She is more interested in Acupuncture than Dry Needle . Asked MD to add that referral, patient will schedule for acupuncture. No charges for today

## 2023-10-06 NOTE — TELEPHONE ENCOUNTER
----- Message from Lizette Osorio sent at 10/6/2023 11:39 AM CDT -----  Regarding: PATIENT'S PT REFERRAL  Good morning,    Patient has a referral in for Dry Needling therapy. She says she requested acupuncture. Can you please place the referral for acupuncture so that we can get her scheduled?    Thanks!

## 2023-10-09 DIAGNOSIS — R52 PAIN: Primary | ICD-10-CM

## 2023-10-17 ENCOUNTER — OFFICE VISIT (OUTPATIENT)
Dept: SPINE | Facility: CLINIC | Age: 53
End: 2023-10-17
Payer: COMMERCIAL

## 2023-10-17 DIAGNOSIS — G89.29 CHRONIC BILATERAL LOW BACK PAIN WITHOUT SCIATICA: ICD-10-CM

## 2023-10-17 DIAGNOSIS — M54.2 CERVICALGIA: Primary | ICD-10-CM

## 2023-10-17 DIAGNOSIS — M54.50 CHRONIC BILATERAL LOW BACK PAIN WITHOUT SCIATICA: ICD-10-CM

## 2023-10-17 PROCEDURE — 1160F PR REVIEW ALL MEDS BY PRESCRIBER/CLIN PHARMACIST DOCUMENTED: ICD-10-PCS | Mod: CPTII,S$GLB,, | Performed by: PHYSICAL MEDICINE & REHABILITATION

## 2023-10-17 PROCEDURE — 1159F PR MEDICATION LIST DOCUMENTED IN MEDICAL RECORD: ICD-10-PCS | Mod: CPTII,S$GLB,, | Performed by: PHYSICAL MEDICINE & REHABILITATION

## 2023-10-17 PROCEDURE — 99213 PR OFFICE/OUTPT VISIT, EST, LEVL III, 20-29 MIN: ICD-10-PCS | Mod: S$GLB,,, | Performed by: PHYSICAL MEDICINE & REHABILITATION

## 2023-10-17 PROCEDURE — 1159F MED LIST DOCD IN RCRD: CPT | Mod: CPTII,S$GLB,, | Performed by: PHYSICAL MEDICINE & REHABILITATION

## 2023-10-17 PROCEDURE — 99213 OFFICE O/P EST LOW 20 MIN: CPT | Mod: S$GLB,,, | Performed by: PHYSICAL MEDICINE & REHABILITATION

## 2023-10-17 PROCEDURE — 3044F PR MOST RECENT HEMOGLOBIN A1C LEVEL <7.0%: ICD-10-PCS | Mod: CPTII,S$GLB,, | Performed by: PHYSICAL MEDICINE & REHABILITATION

## 2023-10-17 PROCEDURE — 1160F RVW MEDS BY RX/DR IN RCRD: CPT | Mod: CPTII,S$GLB,, | Performed by: PHYSICAL MEDICINE & REHABILITATION

## 2023-10-17 PROCEDURE — 3044F HG A1C LEVEL LT 7.0%: CPT | Mod: CPTII,S$GLB,, | Performed by: PHYSICAL MEDICINE & REHABILITATION

## 2023-11-03 ENCOUNTER — OFFICE VISIT (OUTPATIENT)
Dept: FAMILY MEDICINE | Facility: CLINIC | Age: 53
End: 2023-11-03
Payer: COMMERCIAL

## 2023-11-03 VITALS
SYSTOLIC BLOOD PRESSURE: 124 MMHG | BODY MASS INDEX: 25.19 KG/M2 | HEIGHT: 66 IN | DIASTOLIC BLOOD PRESSURE: 72 MMHG | OXYGEN SATURATION: 98 % | TEMPERATURE: 98 F | WEIGHT: 156.75 LBS | RESPIRATION RATE: 18 BRPM | HEART RATE: 82 BPM

## 2023-11-03 DIAGNOSIS — M25.512 LEFT SHOULDER PAIN, UNSPECIFIED CHRONICITY: ICD-10-CM

## 2023-11-03 DIAGNOSIS — M54.12 CERVICAL RADICULOPATHY: Primary | ICD-10-CM

## 2023-11-03 PROCEDURE — 99999 PR PBB SHADOW E&M-EST. PATIENT-LVL IV: CPT | Mod: PBBFAC,,, | Performed by: FAMILY MEDICINE

## 2023-11-03 PROCEDURE — 3074F SYST BP LT 130 MM HG: CPT | Mod: CPTII,S$GLB,, | Performed by: FAMILY MEDICINE

## 2023-11-03 PROCEDURE — 99213 OFFICE O/P EST LOW 20 MIN: CPT | Mod: S$GLB,,, | Performed by: FAMILY MEDICINE

## 2023-11-03 PROCEDURE — 3044F PR MOST RECENT HEMOGLOBIN A1C LEVEL <7.0%: ICD-10-PCS | Mod: CPTII,S$GLB,, | Performed by: FAMILY MEDICINE

## 2023-11-03 PROCEDURE — 3078F DIAST BP <80 MM HG: CPT | Mod: CPTII,S$GLB,, | Performed by: FAMILY MEDICINE

## 2023-11-03 PROCEDURE — 1159F MED LIST DOCD IN RCRD: CPT | Mod: CPTII,S$GLB,, | Performed by: FAMILY MEDICINE

## 2023-11-03 PROCEDURE — 3008F BODY MASS INDEX DOCD: CPT | Mod: CPTII,S$GLB,, | Performed by: FAMILY MEDICINE

## 2023-11-03 PROCEDURE — 3074F PR MOST RECENT SYSTOLIC BLOOD PRESSURE < 130 MM HG: ICD-10-PCS | Mod: CPTII,S$GLB,, | Performed by: FAMILY MEDICINE

## 2023-11-03 PROCEDURE — 99213 PR OFFICE/OUTPT VISIT, EST, LEVL III, 20-29 MIN: ICD-10-PCS | Mod: S$GLB,,, | Performed by: FAMILY MEDICINE

## 2023-11-03 PROCEDURE — 1159F PR MEDICATION LIST DOCUMENTED IN MEDICAL RECORD: ICD-10-PCS | Mod: CPTII,S$GLB,, | Performed by: FAMILY MEDICINE

## 2023-11-03 PROCEDURE — 3008F PR BODY MASS INDEX (BMI) DOCUMENTED: ICD-10-PCS | Mod: CPTII,S$GLB,, | Performed by: FAMILY MEDICINE

## 2023-11-03 PROCEDURE — 3044F HG A1C LEVEL LT 7.0%: CPT | Mod: CPTII,S$GLB,, | Performed by: FAMILY MEDICINE

## 2023-11-03 PROCEDURE — 3078F PR MOST RECENT DIASTOLIC BLOOD PRESSURE < 80 MM HG: ICD-10-PCS | Mod: CPTII,S$GLB,, | Performed by: FAMILY MEDICINE

## 2023-11-03 PROCEDURE — 99999 PR PBB SHADOW E&M-EST. PATIENT-LVL IV: ICD-10-PCS | Mod: PBBFAC,,, | Performed by: FAMILY MEDICINE

## 2023-11-07 ENCOUNTER — CLINICAL SUPPORT (OUTPATIENT)
Dept: REHABILITATION | Facility: HOSPITAL | Age: 53
End: 2023-11-07
Attending: FAMILY MEDICINE
Payer: COMMERCIAL

## 2023-11-07 DIAGNOSIS — M54.50 RIGHT-SIDED LOW BACK PAIN WITHOUT SCIATICA, UNSPECIFIED CHRONICITY: Primary | ICD-10-CM

## 2023-11-07 DIAGNOSIS — R52 PAIN: ICD-10-CM

## 2023-11-07 PROCEDURE — 97810 ACUP 1/> WO ESTIM 1ST 15 MIN: CPT | Mod: PN

## 2023-11-07 PROCEDURE — 97811 ACUP 1/> W/O ESTIM EA ADD 15: CPT | Mod: PN

## 2023-11-07 NOTE — PROGRESS NOTES
Subjective:   Patient ID: Dayo Cortes is a 53 y.o. female     Chief Complaint:Arm Pain (Left-4 months)      Notes pain radiates down left arm. Going on for months. Intermittent. Sharp shooting pain. Also notes decreased strength in left arm when lifting weights in gym.    Arm Pain   Pertinent negatives include no chest pain.     Review of Systems   Respiratory:  Negative for shortness of breath.    Cardiovascular:  Negative for chest pain.   Gastrointestinal:  Negative for abdominal pain.   Genitourinary:  Negative for dysuria.     Past Medical History:   Diagnosis Date    Abnormal Pap smear 2006    once but repeated pap smear and the resulted WNLs.    Abnormal Pap smear of cervix     early 30 's became normal    GERD (gastroesophageal reflux disease)      Past Surgical History:   Procedure Laterality Date    COLONOSCOPY N/A 6/7/2017    Procedure: COLONOSCOPY;  Surgeon: Win Rodriguez MD;  Location: [facility];  Service: Endoscopy;  Laterality: N/A;    EXCISION OF PREPATELLAR BURSA Right 4/28/2023    Procedure: EXCISION, PREPATELLAR BURSA;  Surgeon: Bertram Garcia II, MD;  Location: [facility] OR;  Service: Orthopedics;  Laterality: Right;    HYSTERECTOMY      LUMBAR PARAVERTEBRAL FACET JOINT NERVE BLOCK Bilateral 11/17/2020    Procedure: facet joint injection;  Surgeon: Sina Allen MD;  Location: [facility] OR;  Service: Pain Management;  Laterality: Bilateral;  L4-5, L5-S1    LUMBAR PARAVERTEBRAL FACET JOINT NERVE BLOCK Bilateral 9/14/2023    Procedure: BLOCK, NERVE, FACET JOINT, LUMBAR;  Surgeon: Sina Allen MD;  Location: [facility] OR;  Service: Anesthesiology;  Laterality: Bilateral;  L4-5 and l5-s1 (not MBB facet injections)     Objective:     Vitals:    11/03/23 1539   BP: 124/72   Pulse: 82   Resp: 18   Temp: 98 °F (36.7 °C)     Body mass index is 25.3 kg/m².  Physical Exam  Vitals and nursing note reviewed.   Constitutional:       Appearance: She is well-developed.   HENT:      Head: Normocephalic and  atraumatic.   Eyes:      General: No scleral icterus.     Conjunctiva/sclera: Conjunctivae normal.   Pulmonary:      Effort: Pulmonary effort is normal. No respiratory distress.   Musculoskeletal:         General: No deformity. Normal range of motion.      Cervical back: Normal range of motion and neck supple.   Skin:     Coloration: Skin is not pale.      Findings: No rash.   Neurological:      Mental Status: She is alert and oriented to person, place, and time.   Psychiatric:         Behavior: Behavior normal.         Thought Content: Thought content normal.         Judgment: Judgment normal.       Assessment:     1. Cervical radiculopathy    2. Left shoulder pain, unspecified chronicity      Plan:   Cervical radiculopathy    Left shoulder pain, unspecified chronicity        Counseled on management. Elected f/u with Dr Narayanan. Pt was unaware that her pain probable cause being her cervical spine disease.    Total time spent of Less than 30 minutes minutes on the day of the visit.This includes face to face time and preparing to see the patient, obtaining and reviewing separately obtained history, documenting clinical information in the electronic or other health record, independently interpreting results and communicating results to the patient/family/caregiver, or care coordinator.    Established patient with me has been instructed that must see me at least 1 time yearly (every 365 days) for refills of medications. Seeing other providers in this clinic is fine but expectation is to see me yearly.    Stoney Stack MD  11/07/2023    Portions of this note have been dictated with ODALYS Coyle

## 2023-11-10 PROBLEM — R52 PAIN: Status: ACTIVE | Noted: 2023-11-10

## 2023-11-10 NOTE — PROGRESS NOTES
Acupuncture Evaluation Note     Name: Dayo Cortes  [facility] Number: [id number]    Traditional Chinese Medicine (TCM) Diagnosis: Qi Stagnation  Medical Diagnosis:   Encounter Diagnoses   Name Primary?    Pain     Right-sided low back pain without sciatica, unspecified chronicity Yes        Evaluation Date: 11/7/2023    Visit #/Visits authorized: 1/12    Precautions: Standard    Subjective     Chief Concern: Pain (chronic), all throughout the body including back (chronic low back pain), neck, joints. Interrupts activities of daily living.    Medical necessity is demonstrated by the following IMPAIRMENTS: Medical Necessity: Decreased mobility limits day to day activities, social, and emergent situations              Aggravating Factors:  movement and pressure   Relieving Factors:  rest    Symptom Description:     Quality:  Aching  Severity:  5 but varies  Frequency:  when active      Objective         Pulse:        thready       Treatment       Acupuncture points used:  K3, UB 62/UB 60, SI 4/TB 5, GB 20  Needles In: 10  Needles Out: 10  Needles W/O STIM placed: 10:30am  Needles W/O STIM removed: 11:15am      Assessment     After treatment, patient felt more relaxed and slightly lessened pain    Patient prognosis is Good.     Patient will continue to benefit from acupuncture treatment to address the deficits listed in the problem list box on initial evaluation, provide patient family education and to maximize pt's level of independence in the home and community environment.     Patient's spiritual, cultural and educational needs considered and pt agreeable to plan of care and goals.     Anticipated barriers to treatment: none    Plan     Recommend every couple weeks for 12 sessions with midway reassess.    Education:  Patient is aware of cumulative benefit of acupuncture

## 2023-11-22 ENCOUNTER — CLINICAL SUPPORT (OUTPATIENT)
Dept: REHABILITATION | Facility: HOSPITAL | Age: 53
End: 2023-11-22
Payer: COMMERCIAL

## 2023-11-22 DIAGNOSIS — R52 PAIN: ICD-10-CM

## 2023-11-22 DIAGNOSIS — M54.50 RIGHT-SIDED LOW BACK PAIN WITHOUT SCIATICA, UNSPECIFIED CHRONICITY: Primary | ICD-10-CM

## 2023-11-22 PROCEDURE — 97810 ACUP 1/> WO ESTIM 1ST 15 MIN: CPT | Mod: PN

## 2023-11-22 PROCEDURE — 97811 ACUP 1/> W/O ESTIM EA ADD 15: CPT | Mod: PN

## 2023-11-23 NOTE — PROGRESS NOTES
Acupuncture Evaluation Note     Name: Dayo Cortes  [facility] Number: [id number]    Traditional Chinese Medicine (TCM) Diagnosis: Qi Stagnation  Medical Diagnosis:   Encounter Diagnoses   Name Primary?    Right-sided low back pain without sciatica, unspecified chronicity Yes    Pain         Evaluation Date: 11/22/2023    Visit #/Visits authorized: 2/12    Precautions: Standard    Subjective     Chief Concern: Pain (chronic), all throughout the body including back (chronic low back pain), neck, joints. Interrupts activities of daily living.    Medical necessity is demonstrated by the following IMPAIRMENTS: Medical Necessity: Decreased mobility limits day to day activities, social, and emergent situations              Aggravating Factors:  movement and pressure   Relieving Factors:  rest    Symptom Description:     Quality:  Aching  Severity:  5 but varies  Frequency:  when active      Objective         Pulse:        thready       Treatment       Acupuncture points used:  K3, UB 62/UB 60, SI 4/TB 5, GB 20  Needles In: 10  Needles Out: 10  Needles W/O STIM placed: 8am  Needles W/O STIM removed: 8:45am      Assessment     After treatment, patient felt more relaxed and slightly lessened pain    Patient prognosis is Good.     Patient will continue to benefit from acupuncture treatment to address the deficits listed in the problem list box on initial evaluation, provide patient family education and to maximize pt's level of independence in the home and community environment.     Patient's spiritual, cultural and educational needs considered and pt agreeable to plan of care and goals.     Anticipated barriers to treatment: none    Plan     Recommend every couple weeks for 12 sessions with midway reassess.    Education:  Patient is aware of cumulative benefit of acupuncture

## 2023-12-06 ENCOUNTER — CLINICAL SUPPORT (OUTPATIENT)
Dept: REHABILITATION | Facility: HOSPITAL | Age: 53
End: 2023-12-06
Payer: COMMERCIAL

## 2023-12-06 DIAGNOSIS — M54.50 RIGHT-SIDED LOW BACK PAIN WITHOUT SCIATICA, UNSPECIFIED CHRONICITY: Primary | ICD-10-CM

## 2023-12-06 DIAGNOSIS — R52 PAIN: ICD-10-CM

## 2023-12-06 PROCEDURE — 97810 ACUP 1/> WO ESTIM 1ST 15 MIN: CPT | Mod: PN

## 2023-12-06 PROCEDURE — 97811 ACUP 1/> W/O ESTIM EA ADD 15: CPT | Mod: PN

## 2023-12-09 NOTE — PROGRESS NOTES
Acupuncture Evaluation Note     Name: Dayo Cortes  [facility] Number: [id number]    Traditional Chinese Medicine (TCM) Diagnosis: Qi Stagnation  Medical Diagnosis:   Encounter Diagnoses   Name Primary?    Right-sided low back pain without sciatica, unspecified chronicity Yes    Pain         Evaluation Date: 12/6/2023    Visit #/Visits authorized: 3/12    Precautions: Standard    Subjective     Chief Concern: Pain (chronic), all throughout the body including back (chronic low back pain), neck, joints. Interrupts activities of daily living.    Medical necessity is demonstrated by the following IMPAIRMENTS: Medical Necessity: Decreased mobility limits day to day activities, social, and emergent situations              Aggravating Factors:  movement and pressure   Relieving Factors:  rest    Symptom Description:     Quality:  Aching  Severity:  5 but varies  Frequency:  when active      Objective         Pulse:        thready       Treatment       Acupuncture points used:  K3, UB 62/UB 60, SI 4/TB 5, GB 20  Needles In: 10  Needles Out: 10  Needles W/O STIM placed: 1pm  [place] W/O STIM removed: 1:45am      Assessment     After treatment, patient felt more relaxed and slightly lessened pain    Patient prognosis is Good.     Patient will continue to benefit from acupuncture treatment to address the deficits listed in the problem list box on initial evaluation, provide patient family education and to maximize pt's level of independence in the home and community environment.     Patient's spiritual, cultural and educational needs considered and pt agreeable to plan of care and goals.     Anticipated barriers to treatment: none    Plan     Recommend every couple weeks for 12 sessions with midway reassess.    Education:  Patient is aware of cumulative benefit of acupuncture

## 2023-12-20 ENCOUNTER — CLINICAL SUPPORT (OUTPATIENT)
Dept: REHABILITATION | Facility: HOSPITAL | Age: 53
End: 2023-12-20
Payer: COMMERCIAL

## 2023-12-20 DIAGNOSIS — M54.50 RIGHT-SIDED LOW BACK PAIN WITHOUT SCIATICA, UNSPECIFIED CHRONICITY: Primary | ICD-10-CM

## 2023-12-20 DIAGNOSIS — R52 PAIN: ICD-10-CM

## 2023-12-20 PROCEDURE — 97810 ACUP 1/> WO ESTIM 1ST 15 MIN: CPT | Mod: PN

## 2023-12-20 PROCEDURE — 97811 ACUP 1/> W/O ESTIM EA ADD 15: CPT | Mod: PN

## 2023-12-22 NOTE — PROGRESS NOTES
Acupuncture Evaluation Note     Name: Dayo Cortes  [facility] Number: [id number]    Traditional Chinese Medicine (TCM) Diagnosis: Qi Stagnation  Medical Diagnosis:   Encounter Diagnoses   Name Primary?    Right-sided low back pain without sciatica, unspecified chronicity Yes    Pain         Evaluation Date: 12/20/2023    Visit #/Visits authorized: 4/12    Precautions: Standard    Subjective     Chief Concern: Pain (chronic), all throughout the body including back (chronic low back pain), neck, joints. Interrupts activities of daily living.    Medical necessity is demonstrated by the following IMPAIRMENTS: Medical Necessity: Decreased mobility limits day to day activities, social, and emergent situations              Aggravating Factors:  movement and pressure   Relieving Factors:  rest    Symptom Description:     Quality:  Aching  Severity:  5 but varies  Frequency:  when active      Objective         Pulse:        thready       Treatment       Acupuncture points used:  K3, UB 62/UB 60, SI 4/TB 5, GB 20  Needles In: 10  Needles Out: 10  Needles W/O STIM placed: 10:15am  Needles W/O STIM removed: 11am      Assessment     After treatment, patient felt more relaxed and slightly lessened pain    Patient prognosis is Good.     Patient will continue to benefit from acupuncture treatment to address the deficits listed in the problem list box on initial evaluation, provide patient family education and to maximize pt's level of independence in the home and community environment.     Patient's spiritual, cultural and educational needs considered and pt agreeable to plan of care and goals.     Anticipated barriers to treatment: none    Plan     Recommend every couple weeks for 12 sessions with midway reassess.    Education:  Patient is aware of cumulative benefit of acupuncture

## 2024-01-03 ENCOUNTER — CLINICAL SUPPORT (OUTPATIENT)
Dept: REHABILITATION | Facility: HOSPITAL | Age: 54
End: 2024-01-03
Payer: COMMERCIAL

## 2024-01-03 DIAGNOSIS — R52 PAIN: ICD-10-CM

## 2024-01-03 DIAGNOSIS — M54.50 RIGHT-SIDED LOW BACK PAIN WITHOUT SCIATICA, UNSPECIFIED CHRONICITY: Primary | ICD-10-CM

## 2024-01-03 PROCEDURE — 97811 ACUP 1/> W/O ESTIM EA ADD 15: CPT | Mod: PN

## 2024-01-03 PROCEDURE — 97810 ACUP 1/> WO ESTIM 1ST 15 MIN: CPT | Mod: PN

## 2024-01-05 NOTE — PROGRESS NOTES
Acupuncture Evaluation Note     Name: Dayo Cortes  [facility] Number: [id number]    Traditional Chinese Medicine (TCM) Diagnosis: Qi Stagnation  Medical Diagnosis:   Encounter Diagnoses   Name Primary?    Right-sided low back pain without sciatica, unspecified chronicity Yes    Pain         Evaluation Date: 1/3/2024    Visit #/Visits authorized: 1/12    Precautions: Standard    Subjective     Chief Concern: Pain (chronic), all throughout the body including back (chronic low back pain), neck, joints. Interrupts activities of daily living.    Medical necessity is demonstrated by the following IMPAIRMENTS: Medical Necessity: Decreased mobility limits day to day activities, social, and emergent situations              Aggravating Factors:  movement and pressure   Relieving Factors:  rest    Symptom Description:     Quality:  Aching  Severity:  5 but varies  Frequency:  when active      Objective         Pulse:        thready       Treatment       Acupuncture points used:  K3, UB 62/UB 60, SI 4/TB 5, GB 20  Needles In: 10  Needles Out: 10  Needles W/O STIM placed: 10:15am  Needles W/O STIM removed: 11am      Assessment     After treatment, patient felt more relaxed and slightly lessened pain    Patient prognosis is Good.     Patient will continue to benefit from acupuncture treatment to address the deficits listed in the problem list box on initial evaluation, provide patient family education and to maximize pt's level of independence in the home and community environment.     Patient's spiritual, cultural and educational needs considered and pt agreeable to plan of care and goals.     Anticipated barriers to treatment: none    Plan     Recommend every couple weeks for 12 sessions with midway reassess.    Education:  Patient is aware of cumulative benefit of acupuncture

## 2024-02-14 ENCOUNTER — CLINICAL SUPPORT (OUTPATIENT)
Dept: REHABILITATION | Facility: HOSPITAL | Age: 54
End: 2024-02-14
Payer: COMMERCIAL

## 2024-02-14 DIAGNOSIS — R52 PAIN: ICD-10-CM

## 2024-02-14 DIAGNOSIS — M54.50 RIGHT-SIDED LOW BACK PAIN WITHOUT SCIATICA, UNSPECIFIED CHRONICITY: Primary | ICD-10-CM

## 2024-02-14 PROCEDURE — 97811 ACUP 1/> W/O ESTIM EA ADD 15: CPT | Mod: PN

## 2024-02-14 PROCEDURE — 97810 ACUP 1/> WO ESTIM 1ST 15 MIN: CPT | Mod: PN

## 2024-02-14 NOTE — PROGRESS NOTES
Acupuncture Evaluation Note     Name: Dayo Cortes  [facility] Number: [id number]    Traditional Chinese Medicine (TCM) Diagnosis: Qi Stagnation  Medical Diagnosis:   Encounter Diagnoses   Name Primary?    Right-sided low back pain without sciatica, unspecified chronicity Yes    Pain         Evaluation Date: 2/14/2024    Visit #/Visits authorized: 2/12    Precautions: Standard    Subjective     Chief Concern: Pain (chronic), all throughout the body including back (chronic low back pain), neck, joints. Interrupts activities of daily living.    Medical necessity is demonstrated by the following IMPAIRMENTS: Medical Necessity: Decreased mobility limits day to day activities, social, and emergent situations              Aggravating Factors:  movement and pressure   Relieving Factors:  rest    Symptom Description:     Quality:  Aching  Severity:  5 but varies  Frequency:  when active      Objective         Pulse:        thready       Treatment       Acupuncture points used:  K3, UB 62/UB 60, SI 4/TB 5, GB 20  Needles In: 10  Needles Out: 10  Needles W/O STIM placed: 11am  Needles W/O STIM removed: 11:45am      Assessment     After treatment, patient felt more relaxed and slightly lessened pain    Patient prognosis is Good.     Patient will continue to benefit from acupuncture treatment to address the deficits listed in the problem list box on initial evaluation, provide patient family education and to maximize pt's level of independence in the home and community environment.     Patient's spiritual, cultural and educational needs considered and pt agreeable to plan of care and goals.     Anticipated barriers to treatment: none    Plan     Recommend every couple weeks for 12 sessions with midway reassess.    Education:  Patient is aware of cumulative benefit of acupuncture

## 2024-02-19 ENCOUNTER — OFFICE VISIT (OUTPATIENT)
Dept: FAMILY MEDICINE | Facility: CLINIC | Age: 54
End: 2024-02-19
Payer: COMMERCIAL

## 2024-02-19 VITALS
WEIGHT: 156.75 LBS | BODY MASS INDEX: 25.19 KG/M2 | TEMPERATURE: 98 F | SYSTOLIC BLOOD PRESSURE: 120 MMHG | OXYGEN SATURATION: 98 % | HEART RATE: 103 BPM | DIASTOLIC BLOOD PRESSURE: 80 MMHG | HEIGHT: 66 IN

## 2024-02-19 DIAGNOSIS — R10.9 ABDOMINAL DISCOMFORT: Primary | ICD-10-CM

## 2024-02-19 DIAGNOSIS — R11.0 NAUSEA: ICD-10-CM

## 2024-02-19 PROCEDURE — 1159F MED LIST DOCD IN RCRD: CPT | Mod: CPTII,S$GLB,,

## 2024-02-19 PROCEDURE — 99999 PR PBB SHADOW E&M-EST. PATIENT-LVL IV: CPT | Mod: PBBFAC,,,

## 2024-02-19 PROCEDURE — 3008F BODY MASS INDEX DOCD: CPT | Mod: CPTII,S$GLB,,

## 2024-02-19 PROCEDURE — 3074F SYST BP LT 130 MM HG: CPT | Mod: CPTII,S$GLB,,

## 2024-02-19 PROCEDURE — 99213 OFFICE O/P EST LOW 20 MIN: CPT | Mod: S$GLB,,,

## 2024-02-19 PROCEDURE — 1160F RVW MEDS BY RX/DR IN RCRD: CPT | Mod: CPTII,S$GLB,,

## 2024-02-19 PROCEDURE — 3079F DIAST BP 80-89 MM HG: CPT | Mod: CPTII,S$GLB,,

## 2024-02-19 RX ORDER — SUCRALFATE 1 G/1
1 TABLET ORAL 4 TIMES DAILY
Qty: 120 TABLET | Refills: 1 | Status: SHIPPED | OUTPATIENT
Start: 2024-02-19

## 2024-02-19 NOTE — PROGRESS NOTES
Patient not in the room.  Unable to find her.    Subjective:       Patient ID: Dayo Cortes is a 53 y.o. female.    Chief Complaint: abdominal discomfort, nausea      Dayo Cortes is a 53 y.o. female with history of GERD who presents to clinic for evaluation of abdominal discomfort, nausea and bloating ongoing for about 3 weeks. Describes abdominal pain as intermittent and epigastric. Denies fever, chills, changes in appetite, constipation, diarrhea, vomiting, early satiety or blood in the stool. No known foods triggering symptoms. Tried Mylanta and GasX with intermittent relief. Taking daily probiotic, multivitamin and B12 as well.         Review of Systems   Constitutional:  Negative for appetite change, chills, fatigue and fever.   Respiratory:  Negative for cough and shortness of breath.    Cardiovascular:  Negative for chest pain and palpitations.   Gastrointestinal:  Positive for abdominal pain and nausea. Negative for blood in stool, constipation, diarrhea and vomiting.   Skin:  Negative for rash.   Neurological:  Negative for dizziness, light-headedness and headaches.         Past Medical History:   Diagnosis Date    Abnormal Pap smear 2006    once but repeated pap smear and the resulted WNLs.    Abnormal Pap smear of cervix     early 30 's became normal    GERD (gastroesophageal reflux disease)        Review of patient's allergies indicates:  No Known Allergies      Current Outpatient Medications:     acyclovir (ZOVIRAX) 400 MG tablet, TAKE 1 TABLET(400 MG) BY MOUTH TWICE DAILY, Disp: 180 tablet, Rfl: 3    estradioL (ESTRACE) 1 MG tablet, TK 1 T PO QD, Disp: , Rfl:     L.acid/B.animalis,bifidum/FOS (PROBIOTIC COMPLEX ORAL), Take by mouth., Disp: , Rfl:     multivitamin capsule, Take 1 capsule by mouth once daily., Disp: , Rfl:     naproxen (NAPROSYN) 500 MG tablet, Take 1 tablet (500 mg total) by mouth 2 (two) times daily as needed., Disp: 60 tablet, Rfl: 5    naproxen sodium (ANAPROX) 220 MG tablet, Take 220 mg by mouth every 12 (twelve) hours.,  "Disp: , Rfl:     tiZANidine (ZANAFLEX) 2 MG tablet, Take 2 tablets (4 mg total) by mouth every evening., Disp: 60 tablet, Rfl: 1    sucralfate (CARAFATE) 1 gram tablet, Take 1 tablet (1 g total) by mouth 4 (four) times daily., Disp: 120 tablet, Rfl: 1  No current facility-administered medications for this visit.    Facility-Administered Medications Ordered in Other Visits:     lactated ringers infusion, , Intravenous, Continuous, Sina Allen MD, Last Rate: 25 mL/hr at 09/14/23 0938, New Bag at 09/14/23 0938    LIDOcaine (PF) 10 mg/ml (1%) injection 10 mg, 1 mL, Intradermal, Once, Sina Allen MD    Objective:        Physical Exam  Vitals reviewed.   Constitutional:       General: She is not in acute distress.     Appearance: Normal appearance.   HENT:      Head: Normocephalic and atraumatic.   Eyes:      Conjunctiva/sclera: Conjunctivae normal.   Cardiovascular:      Rate and Rhythm: Normal rate and regular rhythm.      Heart sounds: Normal heart sounds.   Pulmonary:      Effort: Pulmonary effort is normal.      Breath sounds: Normal breath sounds.   Abdominal:      General: Bowel sounds are normal.      Palpations: Abdomen is soft.      Tenderness: There is no abdominal tenderness. There is no guarding or rebound. Negative signs include Delaney's sign.   Musculoskeletal:         General: Normal range of motion.      Cervical back: Normal range of motion and neck supple.   Skin:     General: Skin is warm and dry.   Neurological:      Mental Status: She is alert and oriented to person, place, and time.   Psychiatric:         Mood and Affect: Mood normal.         Behavior: Behavior normal.         Thought Content: Thought content normal.         Judgment: Judgment normal.           Visit Vitals  /80 (BP Location: Right arm, Patient Position: Sitting, BP Method: Small (Manual))   Pulse 103   Temp 97.9 °F (36.6 °C) (Oral)   Ht 5' 6" (1.676 m)   Wt 71.1 kg (156 lb 12 oz)   LMP 03/12/2019 (Exact Date)   SpO2 98% "   BMI 25.30 kg/m²      Assessment:         1. Abdominal discomfort    2. Nausea        Plan:         Diagnoses and all orders for this visit:    Abdominal discomfort  -     Ambulatory referral/consult to Gastroenterology; Future  -     sucralfate (CARAFATE) 1 gram tablet; Take 1 tablet (1 g total) by mouth 4 (four) times daily.  -     Hydrate well. Recommend fiber supplement (Metamucil, Benefiber) daily or high fiber diet.   -     Discussed US of the abdomen, but patient would like to hold off on this for now.     Nausea  -     sucralfate (CARAFATE) 1 gram tablet; Take 1 tablet (1 g total) by mouth 4 (four) times daily.  -      Offered Zofran, but patient declines. Would like to try the Carafate, fiber and see GI first.        Follow up if symptoms worsen or fail to improve as anticipated.          Family Medicine Physician Assistant     Future Appointments       Date Provider Specialty Appt Notes    2/28/2024 Becca Ambriz, [facility] 1W12    3/13/2024 Becca Ambriz, [facility] 1W12    3/27/2024 Becca Ambriz, [facility] 1W12    4/10/2024 Becca Ambriz, [facility] 1W12    4/24/2024 Becca Ambriz, [facility] 1W12 9/23/2024 Stoney Stack MD Family Medicine Annual             All of your core healthy metrics are met.       I spent a total of 20 minutes on the day of the visit.This includes face to face time and non-face to face time preparing to see the patient (eg, review of tests), obtaining and/or reviewing separately obtained history, documenting clinical information in the electronic or other health record, independently interpreting results and communicating results to the patient/family/caregiver, or care coordinator.    We have addressed [3] Low: 2 or more self-limited or minor problems / 1 stable chronic illness / 1 acute, uncomplicated illness or injury  The complexity of the data reviewed and analyzed for this visit was [3] Limited  (Reviewed prior external note, ordered unique testing or reviewed the results of each unique test)   The risk of complications and/or morbidity or mortality are [3] Low risk   The level of Medical Decision Making for this visit is [3] Low

## 2024-02-26 ENCOUNTER — OFFICE VISIT (OUTPATIENT)
Dept: GASTROENTEROLOGY | Facility: CLINIC | Age: 54
End: 2024-02-26
Payer: COMMERCIAL

## 2024-02-26 VITALS
HEART RATE: 72 BPM | DIASTOLIC BLOOD PRESSURE: 92 MMHG | BODY MASS INDEX: 25.01 KG/M2 | HEIGHT: 66 IN | SYSTOLIC BLOOD PRESSURE: 134 MMHG | WEIGHT: 155.63 LBS

## 2024-02-26 DIAGNOSIS — Z79.1 NSAID LONG-TERM USE: ICD-10-CM

## 2024-02-26 DIAGNOSIS — R19.7 INTERMITTENT DIARRHEA: ICD-10-CM

## 2024-02-26 DIAGNOSIS — Z86.010 HISTORY OF COLON POLYPS: ICD-10-CM

## 2024-02-26 DIAGNOSIS — R11.0 NAUSEA: ICD-10-CM

## 2024-02-26 DIAGNOSIS — R12 WATERBRASH: ICD-10-CM

## 2024-02-26 DIAGNOSIS — R14.0 BLOATING: ICD-10-CM

## 2024-02-26 DIAGNOSIS — R10.13 EPIGASTRIC PAIN: Primary | ICD-10-CM

## 2024-02-26 DIAGNOSIS — R14.3 EXCESSIVE GAS: ICD-10-CM

## 2024-02-26 PROCEDURE — 1160F RVW MEDS BY RX/DR IN RCRD: CPT | Mod: CPTII,S$GLB,,

## 2024-02-26 PROCEDURE — 1159F MED LIST DOCD IN RCRD: CPT | Mod: CPTII,S$GLB,,

## 2024-02-26 PROCEDURE — 3008F BODY MASS INDEX DOCD: CPT | Mod: CPTII,S$GLB,,

## 2024-02-26 PROCEDURE — 3075F SYST BP GE 130 - 139MM HG: CPT | Mod: CPTII,S$GLB,,

## 2024-02-26 PROCEDURE — 99999 PR PBB SHADOW E&M-EST. PATIENT-LVL IV: CPT | Mod: PBBFAC,,,

## 2024-02-26 PROCEDURE — 99204 OFFICE O/P NEW MOD 45 MIN: CPT | Mod: S$GLB,,,

## 2024-02-26 PROCEDURE — 3080F DIAST BP >= 90 MM HG: CPT | Mod: CPTII,S$GLB,,

## 2024-02-26 RX ORDER — OMEPRAZOLE 40 MG/1
40 CAPSULE, DELAYED RELEASE ORAL DAILY
Qty: 90 CAPSULE | Refills: 1 | Status: SHIPPED | OUTPATIENT
Start: 2024-02-26 | End: 2024-08-24

## 2024-02-26 NOTE — H&P (VIEW-ONLY)
Subjective:       Patient ID: Dayo Cortes is a 53 y.o. female Body mass index is 25.12 kg/m².    Chief Complaint: Abdominal Pain    This patient is new to me.  Referring Provider: Linda Sargent for abdominal pain.  Established patient of Dr. Rodriguez, Dr. Maldonado acosta.                    GI Problem  The primary symptoms include abdominal pain (a month now pain started), nausea (nausea daily with abdominal pain) and diarrhea (reports since abdominal pain started a month ago has noticed intermittent diarrhea once or twice a week, no nocturnal diarrhea). Primary symptoms do not include fever, weight loss, fatigue, vomiting, melena, hematemesis, jaundice, hematochezia, dysuria, myalgias, arthralgias or rash.   Onset: eating did make it worse, felt bloated. Progression: daily, intermittent, describes pain as a dull achy, nauseating bloating sensation reports Gas-X did helped as well. The abdominal pain is located in the epigastric region (eating worsened pain). The severity of the abdominal pain is 5/10. Relieved by: improved by taking sucrafate has been attempting to take it w/ every meal since wedn, reports taking milk of magnesia at helped have increased frequency in stools feels states felt like it cleaned her out and improved her symptoms.   Onset: Denies antibiotic use, travel, ill contacts, hospitalizations, no change in meds besides started on Carafate. The diarrhea is watery (rates stool type 7 on [place] stool scale, reports takes a probiotic daily). Daily occurrences: Denies any dietary aggravating factors like dairy or gluten causing intermittent loose stools.   The illness is also significant for bloating. The illness does not include anorexia, dysphagia, odynophagia or constipation (Denies constipation states has a BM daily, no straining, feels empty, rates stool type 4 on [place] stool scale, reports this intermittent diarrhea is new). Associated symptoms comments: Denies family history of colon cancer,  however, her mother had adenomatous polyps in her 30s.   Last colonoscopy in 2022 with Dr. Maldonado Duncan, patient does have history of colon polyps, but reports his her last colonoscopy did not have any colon polyps, reports would like to continue her colonoscopies with Dr. Duncan  . Significant associated medical issues include hemorrhoids. Associated medical issues do not include inflammatory bowel disease, GERD (Denies history of GERD, reports recent symptoms of water brash, avoids acidic foods, states years ago tried Prilosec due to tonsil issues, does report for back pain takes Aleve 2 to 3 times a week reports taking it daily about 2 years ago), gallstones, liver disease, alcohol abuse, PUD, gastric bypass, bowel resection, irritable bowel syndrome or diverticulitis.       Review of Systems   Constitutional:  Negative for fatigue, fever and weight loss.   Gastrointestinal:  Positive for abdominal pain (a month now pain started), bloating, diarrhea (reports since abdominal pain started a month ago has noticed intermittent diarrhea once or twice a week, no nocturnal diarrhea) and nausea (nausea daily with abdominal pain). Negative for abdominal distention, anal bleeding, anorexia, blood in stool, constipation (Denies constipation states has a BM daily, no straining, feels empty, rates stool type 4 on [place] stool scale, reports this intermittent diarrhea is new), dysphagia, hematemesis, hematochezia, jaundice, melena, rectal pain and vomiting.   Genitourinary:  Negative for dysuria.   Musculoskeletal:  Negative for arthralgias and myalgias.   Skin:  Negative for rash.         Patient's last menstrual period was 03/12/2019 (exact date).  Past Medical History:   Diagnosis Date    Abnormal Pap smear 2006    once but repeated pap smear and the resulted WNLs.    Abnormal Pap smear of cervix     early 30 's became normal    Colon polyp     GERD (gastroesophageal reflux disease)      Past Surgical History:    Procedure Laterality Date    COLONOSCOPY N/A 06/07/2017    Procedure: COLONOSCOPY;  Surgeon: Win Rodriguez MD;  Location: [facility] ENDO;  Service: Endoscopy;  Laterality: N/A;    EXCISION OF PREPATELLAR BURSA Right 04/28/2023    Procedure: EXCISION, PREPATELLAR BURSA;  Surgeon: Bertram Garcia II, MD;  Location: [facility] OR;  Service: Orthopedics;  Laterality: Right;    HYSTERECTOMY      LUMBAR PARAVERTEBRAL FACET JOINT NERVE BLOCK Bilateral 11/17/2020    Procedure: facet joint injection;  Surgeon: Sina Allen MD;  Location: [facility] OR;  Service: Pain Management;  Laterality: Bilateral;  L4-5, L5-S1    LUMBAR PARAVERTEBRAL FACET JOINT NERVE BLOCK Bilateral 09/14/2023    Procedure: BLOCK, NERVE, FACET JOINT, LUMBAR;  Surgeon: Sina Allen MD;  Location: [facility] OR;  Service: Anesthesiology;  Laterality: Bilateral;  L4-5 and l5-s1 (not MBB facet injections)     Family History   Problem Relation Age of Onset    Colon polyps Mother     Breast cancer Mother 40        unconfirmed.    Thyroid disease Maternal Aunt         nodules and hashimotos    Diabetes Maternal Grandmother     Ovarian cancer Neg Hx     Colon cancer Neg Hx     Crohn's disease Neg Hx     Ulcerative colitis Neg Hx      Social History     Tobacco Use    Smoking status: Never    Smokeless tobacco: Never   Substance Use Topics    Alcohol use: Yes     Comment: occasional    Drug use: No     Wt Readings from Last 10 Encounters:   02/26/24 70.6 kg (155 lb 10.3 oz)   02/19/24 71.1 kg (156 lb 12 oz)   11/03/23 71.1 kg (156 lb 12 oz)   09/20/23 71.3 kg (157 lb 3 oz)   09/12/23 72.6 kg (160 lb)   08/16/23 72.9 kg (160 lb 11.5 oz)   08/08/23 72.9 kg (160 lb 11.5 oz)   08/06/23 70.3 kg (155 lb)   06/13/23 68 kg (149 lb 14.6 oz)   05/15/23 68 kg (149 lb 14.6 oz)     Lab Results   Component Value Date    WBC 4.71 09/21/2023    HGB 12.1 09/21/2023    HCT 39.9 08/06/2023    MCV 87 08/06/2023     08/06/2023     CMP  Sodium   Date Value Ref Range Status   09/21/2023 139  "136 - 145 mmol/L Final     Potassium   Date Value Ref Range Status   09/21/2023 3.8 3.5 - 5.1 mmol/L Final     Chloride   Date Value Ref Range Status   09/21/2023 107 95 - 110 mmol/L Final     CO2   Date Value Ref Range Status   09/21/2023 25 23 - 29 mmol/L Final     Glucose   Date Value Ref Range Status   09/21/2023 76 70 - 110 mg/dL Final     BUN   Date Value Ref Range Status   09/21/2023 14 6 - 20 mg/dL Final     Creatinine   Date Value Ref Range Status   09/21/2023 0.8 0.5 - 1.4 mg/dL Final     Calcium   Date Value Ref Range Status   09/21/2023 8.6 (L) 8.7 - 10.5 mg/dL Final     Total Protein   Date Value Ref Range Status   09/21/2023 6.4 6.0 - 8.4 g/dL Final     Albumin   Date Value Ref Range Status   09/21/2023 3.6 3.5 - 5.2 g/dL Final     Total Bilirubin   Date Value Ref Range Status   09/21/2023 0.4 0.1 - 1.0 mg/dL Final     Comment:     For infants and newborns, interpretation of results should be based  on gestational age, weight and in agreement with clinical  observations.    Premature Infant recommended reference ranges:  Up to 24 hours.............<8.0 mg/dL  Up to 48 hours............<12.0 mg/dL  3-5 days..................<15.0 mg/dL  6-29 days.................<15.0 mg/dL       Alkaline Phosphatase   Date Value Ref Range Status   09/21/2023 51 (L) 55 - 135 U/L Final     AST   Date Value Ref Range Status   09/21/2023 19 10 - 40 U/L Final     ALT   Date Value Ref Range Status   09/21/2023 18 10 - 44 U/L Final     Anion Gap   Date Value Ref Range Status   09/21/2023 7 (L) 8 - 16 mmol/L Final     eGFR if    Date Value Ref Range Status   05/27/2021 >60.0 >60 mL/min/1.73 m^2 Final     eGFR if non    Date Value Ref Range Status   05/27/2021 >60.0 >60 mL/min/1.73 m^2 Final     Comment:     Calculation used to obtain the estimated glomerular filtration  rate (eGFR) is the CKD-EPI equation.        No results found for: "LIPASE"  No results found for: "LIPASERES"  Lab Results "   Component Value Date    TSH 1.137 09/14/2022       Reviewed prior medical records including office visit 02/19/2024 angel Sargent radiology report of & endoscopy history (see surgical history/procedures).    Objective:      Physical Exam  Vitals and nursing note reviewed.   Constitutional:       Appearance: Normal appearance. She is normal weight.   Cardiovascular:      Rate and Rhythm: Normal rate and regular rhythm.      Heart sounds: Normal heart sounds.   Pulmonary:      Breath sounds: Normal breath sounds.   Abdominal:      General: Bowel sounds are normal.      Palpations: Abdomen is soft.      Tenderness: There is no abdominal tenderness.   Skin:     General: Skin is warm and dry.      Coloration: Skin is not jaundiced.   Neurological:      Mental Status: She is alert and oriented to person, place, and time.   Psychiatric:         Mood and Affect: Mood normal.         Behavior: Behavior normal.         Assessment:       1. Epigastric pain    2. Nausea    3. Bloating    4. Excessive gas    5. NSAID long-term use    6. History of colon polyps    7. Intermittent diarrhea        Plan:       Epigastric pain  -   START  omeprazole (PRILOSEC) 40 MG capsule; Take 1 capsule (40 mg total) by mouth Daily.  Dispense: 90 capsule; Refill: 1  -     US Abdomen Complete; Future; Expected date: 02/26/2024  -     Lipase; Future; Expected date: 02/26/2024  -     Case Request Endoscopy: EGD (ESOPHAGOGASTRODUODENOSCOPY)  - schedule EGD, discussed procedure with patient, including risks and benefits, patient verbalized understanding  - follow GERD lifestyle modifications  -continue Carafate 1 g as needed    Waterbrash   - schedule EGD, discussed procedure with patient, including risks and benefits, patient verbalized understanding   -   START  omeprazole (PRILOSEC) 40 MG capsule; Take 1 capsule (40 mg total) by mouth Daily.  Dispense: 90 capsule; Refill: 1   -Take PPI 30min-1hr before eating breakfast  -Educated patient on  lifestyle modifications to help control/reduce reflux/abdominal pain including: avoid large meals, avoid eating within 2-3 hours of bedtime (avoid late night eating & lying down soon after eating), elevate head of bed if nocturnal symptoms are present, smoking cessation (if current smoker), & weight loss (if overweight).   -Educated to avoid known foods which trigger reflux symptoms & to minimize/avoid high-fat foods, chocolate, caffeine, citrus, alcohol, & tomato products.    Nausea  -     Case Request Endoscopy: EGD (ESOPHAGOGASTRODUODENOSCOPY)   -     US Abdomen Complete; Future; Expected date: 02/26/2024   -start on PPI   -consider Zofran   -consider gastric emptying study    Bloating, Excessive gas   -testing for H. Pylori typically performed during EGD   -Possible duodenal biopsy to rule out celiac disease  -     Case Request Endoscopy: EGD (ESOPHAGOGASTRODUODENOSCOPY)  - recommend OTC simethicone as directed, such as Phazyme or Gas-x  - recommend low gas diet: Reduce or eliminate these foods from your diet: Broccoli, Cauliflower, Pickerel sprouts, Cabbage, Cooked dried beans, Carbonated beverages (sparkling water, soda, beer, champagne)  Other Causes Of Excess Gas Include:   1) EATING TOO FAST or TALKING WHILE YOU CHEW may cause you to swallow air. This increases the amount of gas in the stomach and may worsen your symptoms.  --> Chew each mouthful completely before swallowing. Take your time.  2) OVEREATING may increase the feeling of being bloated and cause more gas.  --> When you are full, stop eating.  3) CONSTIPATION can increase the amount of normal intestinal gas.  --> Avoid constipation by increasing the amount of fiber in your diet by including whole cereal grains, fresh vegetables (except those in the above list) and fresh fruits. High-fiber foods absorb water and carry it out of the body. When increasing the amount of fiber in your diet, you also need to increase the amount of water that you drink.  You should drink at least eight 8-ounce glasses of water (two quarts) per day.  -consider xray abdomen    NSAID long-term use  -     Case Request Endoscopy: EGD (ESOPHAGOGASTRODUODENOSCOPY)  -Advised to avoid/limit use of NSAID's, since they can cause GI upset, bleeding, and/or ulcers. If needed, take with food.     History of colon polyps   -last colonoscopy 2022, repeat colonoscopy due 12/2027 reports will follow up with Dr. Duncan for next colonoscopy    Intermittent diarrhea  -     Giardia / Cryptosporidum, EIA; Future; Expected date: 02/26/2024  -     Stool Exam-Ova,Cysts,Parasites; Future; Expected date: 02/26/2024  -     Clostridium difficile EIA; Future; Expected date: 02/26/2024  -     Stool culture; Future; Expected date: 02/26/2024  - recommend OTC probiotic, such as Florastor or Culturelle, taken as directed on packaging  - avoid lactose, alcohol, & caffeine  - avoid known triggers  -consider repeat colonoscopy if symptoms worsen or do not improve  Recommend high fiber diet (20-30 grams of fiber daily)/OTC fiber supplements daily as directed, such as Benefiber or Metamucil.      Follow up if symptoms worsen or fail to improve.      If no improvement in symptoms or symptoms worsen, call/follow-up at clinic or go to ER.       [facility]  [facility] - GASTRO[facility]OLOGY  [facility], [place]     Dictation software program was used for this note. Please expect some simple typographical  errors in this note.    Encounter includes face to face time and non-face to face time preparing to see the patient (eg, review of tests), obtaining and/or reviewing separately obtained history, documenting clinical information in the electronic or other health record, independently interpreting results (not separately reported) and communicating results to the patient/family/caregiver, or care coordination (not separately reported).

## 2024-02-26 NOTE — PROGRESS NOTES
Subjective:       Patient ID: Dayo Cortes is a 53 y.o. female Body mass index is 25.12 kg/m².    Chief Complaint: Abdominal Pain    This patient is new to me.  Referring Provider: Linda Sargent for abdominal pain.  Established patient of Dr. Rodriguez, Dr. Maldonado acosta.                    GI Problem  The primary symptoms include abdominal pain (a month now pain started), nausea (nausea daily with abdominal pain) and diarrhea (reports since abdominal pain started a month ago has noticed intermittent diarrhea once or twice a week, no nocturnal diarrhea). Primary symptoms do not include fever, weight loss, fatigue, vomiting, melena, hematemesis, jaundice, hematochezia, dysuria, myalgias, arthralgias or rash.   Onset: eating did make it worse, felt bloated. Progression: daily, intermittent, describes pain as a dull achy, nauseating bloating sensation reports Gas-X did helped as well. The abdominal pain is located in the epigastric region (eating worsened pain). The severity of the abdominal pain is 5/10. Relieved by: improved by taking sucrafate has been attempting to take it w/ every meal since wedn, reports taking milk of magnesia at helped have increased frequency in stools feels states felt like it cleaned her out and improved her symptoms.   Onset: Denies antibiotic use, travel, ill contacts, hospitalizations, no change in meds besides started on Carafate. The diarrhea is watery (rates stool type 7 on [place] stool scale, reports takes a probiotic daily). Daily occurrences: Denies any dietary aggravating factors like dairy or gluten causing intermittent loose stools.   The illness is also significant for bloating. The illness does not include anorexia, dysphagia, odynophagia or constipation (Denies constipation states has a BM daily, no straining, feels empty, rates stool type 4 on [place] stool scale, reports this intermittent diarrhea is new). Associated symptoms comments: Denies family history of colon cancer,  however, her mother had adenomatous polyps in her 30s.   Last colonoscopy in 2022 with Dr. Maldonado Duncan, patient does have history of colon polyps, but reports his her last colonoscopy did not have any colon polyps, reports would like to continue her colonoscopies with Dr. Duncan  . Significant associated medical issues include hemorrhoids. Associated medical issues do not include inflammatory bowel disease, GERD (Denies history of GERD, reports recent symptoms of water brash, avoids acidic foods, states years ago tried Prilosec due to tonsil issues, does report for back pain takes Aleve 2 to 3 times a week reports taking it daily about 2 years ago), gallstones, liver disease, alcohol abuse, PUD, gastric bypass, bowel resection, irritable bowel syndrome or diverticulitis.       Review of Systems   Constitutional:  Negative for fatigue, fever and weight loss.   Gastrointestinal:  Positive for abdominal pain (a month now pain started), bloating, diarrhea (reports since abdominal pain started a month ago has noticed intermittent diarrhea once or twice a week, no nocturnal diarrhea) and nausea (nausea daily with abdominal pain). Negative for abdominal distention, anal bleeding, anorexia, blood in stool, constipation (Denies constipation states has a BM daily, no straining, feels empty, rates stool type 4 on [place] stool scale, reports this intermittent diarrhea is new), dysphagia, hematemesis, hematochezia, jaundice, melena, rectal pain and vomiting.   Genitourinary:  Negative for dysuria.   Musculoskeletal:  Negative for arthralgias and myalgias.   Skin:  Negative for rash.         Patient's last menstrual period was 03/12/2019 (exact date).  Past Medical History:   Diagnosis Date    Abnormal Pap smear 2006    once but repeated pap smear and the resulted WNLs.    Abnormal Pap smear of cervix     early 30 's became normal    Colon polyp     GERD (gastroesophageal reflux disease)      Past Surgical History:    Procedure Laterality Date    COLONOSCOPY N/A 06/07/2017    Procedure: COLONOSCOPY;  Surgeon: Win Rodriguez MD;  Location: [facility] ENDO;  Service: Endoscopy;  Laterality: N/A;    EXCISION OF PREPATELLAR BURSA Right 04/28/2023    Procedure: EXCISION, PREPATELLAR BURSA;  Surgeon: Bertram Garcia II, MD;  Location: [facility] OR;  Service: Orthopedics;  Laterality: Right;    HYSTERECTOMY      LUMBAR PARAVERTEBRAL FACET JOINT NERVE BLOCK Bilateral 11/17/2020    Procedure: facet joint injection;  Surgeon: Sina Allen MD;  Location: [facility] OR;  Service: Pain Management;  Laterality: Bilateral;  L4-5, L5-S1    LUMBAR PARAVERTEBRAL FACET JOINT NERVE BLOCK Bilateral 09/14/2023    Procedure: BLOCK, NERVE, FACET JOINT, LUMBAR;  Surgeon: Sina Allen MD;  Location: [facility] OR;  Service: Anesthesiology;  Laterality: Bilateral;  L4-5 and l5-s1 (not MBB facet injections)     Family History   Problem Relation Age of Onset    Colon polyps Mother     Breast cancer Mother 40        unconfirmed.    Thyroid disease Maternal Aunt         nodules and hashimotos    Diabetes Maternal Grandmother     Ovarian cancer Neg Hx     Colon cancer Neg Hx     Crohn's disease Neg Hx     Ulcerative colitis Neg Hx      Social History     Tobacco Use    Smoking status: Never    Smokeless tobacco: Never   Substance Use Topics    Alcohol use: Yes     Comment: occasional    Drug use: No     Wt Readings from Last 10 Encounters:   02/26/24 70.6 kg (155 lb 10.3 oz)   02/19/24 71.1 kg (156 lb 12 oz)   11/03/23 71.1 kg (156 lb 12 oz)   09/20/23 71.3 kg (157 lb 3 oz)   09/12/23 72.6 kg (160 lb)   08/16/23 72.9 kg (160 lb 11.5 oz)   08/08/23 72.9 kg (160 lb 11.5 oz)   08/06/23 70.3 kg (155 lb)   06/13/23 68 kg (149 lb 14.6 oz)   05/15/23 68 kg (149 lb 14.6 oz)     Lab Results   Component Value Date    WBC 4.71 09/21/2023    HGB 12.1 09/21/2023    HCT 39.9 08/06/2023    MCV 87 08/06/2023     08/06/2023     CMP  Sodium   Date Value Ref Range Status   09/21/2023 139  "136 - 145 mmol/L Final     Potassium   Date Value Ref Range Status   09/21/2023 3.8 3.5 - 5.1 mmol/L Final     Chloride   Date Value Ref Range Status   09/21/2023 107 95 - 110 mmol/L Final     CO2   Date Value Ref Range Status   09/21/2023 25 23 - 29 mmol/L Final     Glucose   Date Value Ref Range Status   09/21/2023 76 70 - 110 mg/dL Final     BUN   Date Value Ref Range Status   09/21/2023 14 6 - 20 mg/dL Final     Creatinine   Date Value Ref Range Status   09/21/2023 0.8 0.5 - 1.4 mg/dL Final     Calcium   Date Value Ref Range Status   09/21/2023 8.6 (L) 8.7 - 10.5 mg/dL Final     Total Protein   Date Value Ref Range Status   09/21/2023 6.4 6.0 - 8.4 g/dL Final     Albumin   Date Value Ref Range Status   09/21/2023 3.6 3.5 - 5.2 g/dL Final     Total Bilirubin   Date Value Ref Range Status   09/21/2023 0.4 0.1 - 1.0 mg/dL Final     Comment:     For infants and newborns, interpretation of results should be based  on gestational age, weight and in agreement with clinical  observations.    Premature Infant recommended reference ranges:  Up to 24 hours.............<8.0 mg/dL  Up to 48 hours............<12.0 mg/dL  3-5 days..................<15.0 mg/dL  6-29 days.................<15.0 mg/dL       Alkaline Phosphatase   Date Value Ref Range Status   09/21/2023 51 (L) 55 - 135 U/L Final     AST   Date Value Ref Range Status   09/21/2023 19 10 - 40 U/L Final     ALT   Date Value Ref Range Status   09/21/2023 18 10 - 44 U/L Final     Anion Gap   Date Value Ref Range Status   09/21/2023 7 (L) 8 - 16 mmol/L Final     eGFR if    Date Value Ref Range Status   05/27/2021 >60.0 >60 mL/min/1.73 m^2 Final     eGFR if non    Date Value Ref Range Status   05/27/2021 >60.0 >60 mL/min/1.73 m^2 Final     Comment:     Calculation used to obtain the estimated glomerular filtration  rate (eGFR) is the CKD-EPI equation.        No results found for: "LIPASE"  No results found for: "LIPASERES"  Lab Results "   Component Value Date    TSH 1.137 09/14/2022       Reviewed prior medical records including office visit 02/19/2024 angel Sargent radiology report of & endoscopy history (see surgical history/procedures).    Objective:      Physical Exam  Vitals and nursing note reviewed.   Constitutional:       Appearance: Normal appearance. She is normal weight.   Cardiovascular:      Rate and Rhythm: Normal rate and regular rhythm.      Heart sounds: Normal heart sounds.   Pulmonary:      Breath sounds: Normal breath sounds.   Abdominal:      General: Bowel sounds are normal.      Palpations: Abdomen is soft.      Tenderness: There is no abdominal tenderness.   Skin:     General: Skin is warm and dry.      Coloration: Skin is not jaundiced.   Neurological:      Mental Status: She is alert and oriented to person, place, and time.   Psychiatric:         Mood and Affect: Mood normal.         Behavior: Behavior normal.         Assessment:       1. Epigastric pain    2. Nausea    3. Bloating    4. Excessive gas    5. NSAID long-term use    6. History of colon polyps    7. Intermittent diarrhea        Plan:       Epigastric pain  -   START  omeprazole (PRILOSEC) 40 MG capsule; Take 1 capsule (40 mg total) by mouth Daily.  Dispense: 90 capsule; Refill: 1  -     US Abdomen Complete; Future; Expected date: 02/26/2024  -     Lipase; Future; Expected date: 02/26/2024  -     Case Request Endoscopy: EGD (ESOPHAGOGASTRODUODENOSCOPY)  - schedule EGD, discussed procedure with patient, including risks and benefits, patient verbalized understanding  - follow GERD lifestyle modifications  -continue Carafate 1 g as needed    Waterbrash   - schedule EGD, discussed procedure with patient, including risks and benefits, patient verbalized understanding   -   START  omeprazole (PRILOSEC) 40 MG capsule; Take 1 capsule (40 mg total) by mouth Daily.  Dispense: 90 capsule; Refill: 1   -Take PPI 30min-1hr before eating breakfast  -Educated patient on  lifestyle modifications to help control/reduce reflux/abdominal pain including: avoid large meals, avoid eating within 2-3 hours of bedtime (avoid late night eating & lying down soon after eating), elevate head of bed if nocturnal symptoms are present, smoking cessation (if current smoker), & weight loss (if overweight).   -Educated to avoid known foods which trigger reflux symptoms & to minimize/avoid high-fat foods, chocolate, caffeine, citrus, alcohol, & tomato products.    Nausea  -     Case Request Endoscopy: EGD (ESOPHAGOGASTRODUODENOSCOPY)   -     US Abdomen Complete; Future; Expected date: 02/26/2024   -start on PPI   -consider Zofran   -consider gastric emptying study    Bloating, Excessive gas   -testing for H. Pylori typically performed during EGD   -Possible duodenal biopsy to rule out celiac disease  -     Case Request Endoscopy: EGD (ESOPHAGOGASTRODUODENOSCOPY)  - recommend OTC simethicone as directed, such as Phazyme or Gas-x  - recommend low gas diet: Reduce or eliminate these foods from your diet: Broccoli, Cauliflower, [place] sprouts, Cabbage, Cooked dried beans, Carbonated beverages (sparkling water, soda, beer, champagne)  Other Causes Of Excess Gas Include:   1) EATING TOO FAST or TALKING WHILE YOU CHEW may cause you to swallow air. This increases the amount of gas in the stomach and may worsen your symptoms.  --> Chew each mouthful completely before swallowing. Take your time.  2) OVEREATING may increase the feeling of being bloated and cause more gas.  --> When you are full, stop eating.  3) CONSTIPATION can increase the amount of normal intestinal gas.  --> Avoid constipation by increasing the amount of fiber in your diet by including whole cereal grains, fresh vegetables (except those in the above list) and fresh fruits. High-fiber foods absorb water and carry it out of the body. When increasing the amount of fiber in your diet, you also need to increase the amount of water that you drink.  You should drink at least eight 8-ounce glasses of water (two quarts) per day.  -consider xray abdomen    NSAID long-term use  -     Case Request Endoscopy: EGD (ESOPHAGOGASTRODUODENOSCOPY)  -Advised to avoid/limit use of NSAID's, since they can cause GI upset, bleeding, and/or ulcers. If needed, take with food.     History of colon polyps   -last colonoscopy 2022, repeat colonoscopy due 12/2027 reports will follow up with Dr. Duncan for next colonoscopy    Intermittent diarrhea  -     Giardia / Cryptosporidum, EIA; Future; Expected date: 02/26/2024  -     Stool Exam-Ova,Cysts,Parasites; Future; Expected date: 02/26/2024  -     Clostridium difficile EIA; Future; Expected date: 02/26/2024  -     Stool culture; Future; Expected date: 02/26/2024  - recommend OTC probiotic, such as Florastor or Culturelle, taken as directed on packaging  - avoid lactose, alcohol, & caffeine  - avoid known triggers  -consider repeat colonoscopy if symptoms worsen or do not improve  Recommend high fiber diet (20-30 grams of fiber daily)/OTC fiber supplements daily as directed, such as Benefiber or Metamucil.      Follow up if symptoms worsen or fail to improve.      If no improvement in symptoms or symptoms worsen, call/follow-up at clinic or go to ER.       [facility]  [facility] - GASTROENTEROLOGY  [facility], [place]     Dictation software program was used for this note. Please expect some simple typographical  errors in this note.    Encounter includes face to face time and non-face to face time preparing to see the patient (eg, review of tests), obtaining and/or reviewing separately obtained history, documenting clinical information in the electronic or other health record, independently interpreting results (not separately reported) and communicating results to the patient/family/caregiver, or care coordination (not separately reported).

## 2024-02-27 ENCOUNTER — PATIENT MESSAGE (OUTPATIENT)
Dept: GASTROENTEROLOGY | Facility: CLINIC | Age: 54
End: 2024-02-27
Payer: COMMERCIAL

## 2024-02-28 ENCOUNTER — HOSPITAL ENCOUNTER (OUTPATIENT)
Dept: RADIOLOGY | Facility: HOSPITAL | Age: 54
Discharge: HOME | End: 2024-02-28
Payer: COMMERCIAL

## 2024-02-28 DIAGNOSIS — R11.0 NAUSEA: ICD-10-CM

## 2024-02-28 DIAGNOSIS — R10.13 EPIGASTRIC PAIN: ICD-10-CM

## 2024-02-28 PROCEDURE — 76700 US EXAM ABDOM COMPLETE: CPT | Mod: TC

## 2024-02-29 ENCOUNTER — PATIENT MESSAGE (OUTPATIENT)
Dept: INTERNAL MEDICINE | Facility: CLINIC | Age: 54
End: 2024-02-29
Payer: COMMERCIAL

## 2024-03-01 ENCOUNTER — ANESTHESIA EVENT (OUTPATIENT)
Dept: ENDOSCOPY | Facility: HOSPITAL | Age: 54
End: 2024-03-01
Payer: COMMERCIAL

## 2024-03-01 ENCOUNTER — HOSPITAL ENCOUNTER (OUTPATIENT)
Facility: HOSPITAL | Age: 54
Discharge: HOME | End: 2024-03-01
Attending: INTERNAL MEDICINE | Admitting: INTERNAL MEDICINE
Payer: COMMERCIAL

## 2024-03-01 ENCOUNTER — ANESTHESIA (OUTPATIENT)
Dept: ENDOSCOPY | Facility: HOSPITAL | Age: 54
End: 2024-03-01
Payer: COMMERCIAL

## 2024-03-01 DIAGNOSIS — K44.9 HIATAL HERNIA: ICD-10-CM

## 2024-03-01 DIAGNOSIS — R10.13 EPIGASTRIC PAIN: ICD-10-CM

## 2024-03-01 DIAGNOSIS — K29.70 GASTRITIS, PRESENCE OF BLEEDING UNSPECIFIED, UNSPECIFIED CHRONICITY, UNSPECIFIED GASTRITIS TYPE: Primary | ICD-10-CM

## 2024-03-01 PROCEDURE — 43239 EGD BIOPSY SINGLE/MULTIPLE: CPT | Performed by: INTERNAL MEDICINE

## 2024-03-01 PROCEDURE — 43239 EGD BIOPSY SINGLE/MULTIPLE: CPT | Mod: ,,, | Performed by: INTERNAL MEDICINE

## 2024-03-01 PROCEDURE — 27201012 HC FORCEPS, HOT/COLD, DISP: Performed by: INTERNAL MEDICINE

## 2024-03-01 PROCEDURE — 25000003 PHARM REV CODE 250: Performed by: STUDENT IN AN ORGANIZED HEALTH CARE EDUCATION/TRAINING PROGRAM

## 2024-03-01 PROCEDURE — 25000003 PHARM REV CODE 250: Performed by: INTERNAL MEDICINE

## 2024-03-01 PROCEDURE — 37000008 HC ANESTHESIA 1ST 15 MINUTES: Performed by: INTERNAL MEDICINE

## 2024-03-01 RX ORDER — PROPOFOL 10 MG/ML
VIAL (ML) INTRAVENOUS
Status: DISCONTINUED | OUTPATIENT
Start: 2024-03-01 | End: 2024-03-01

## 2024-03-01 RX ORDER — LIDOCAINE HYDROCHLORIDE 20 MG/ML
INJECTION INTRAVENOUS
Status: DISCONTINUED | OUTPATIENT
Start: 2024-03-01 | End: 2024-03-01

## 2024-03-01 RX ORDER — SODIUM CHLORIDE 9 MG/ML
INJECTION, SOLUTION INTRAVENOUS CONTINUOUS
Status: DISCONTINUED | OUTPATIENT
Start: 2024-03-01 | End: 2024-03-01 | Stop reason: HOSPADM

## 2024-03-01 RX ADMIN — SODIUM CHLORIDE: 9 INJECTION, SOLUTION INTRAVENOUS at 11:03

## 2024-03-01 RX ADMIN — LIDOCAINE HYDROCHLORIDE 100 MG: 20 INJECTION, SOLUTION INTRAVENOUS at 12:03

## 2024-03-01 RX ADMIN — Medication 100 MG: at 12:03

## 2024-03-01 RX ADMIN — Medication 20 MG: at 12:03

## 2024-03-01 NOTE — ANESTHESIA POSTPROCEDURE EVALUATION
Anesthesia Post Evaluation    Patient: Dayo Cortes    Procedure(s) Performed: Procedure(s) (LRB):  EGD (ESOPHAGOGASTRODUODENOSCOPY) (N/A)    Final Anesthesia Type: general      Patient location during evaluation: PACU  Patient participation: Yes- Able to Participate  Level of consciousness: awake and alert  Post-procedure vital signs: reviewed and stable  Pain management: adequate  Airway patency: patent    PONV status at discharge: No PONV  Anesthetic complications: no      Cardiovascular status: blood pressure returned to baseline and hypertensive  Respiratory status: unassisted  Hydration status: euvolemic  Follow-up not needed.              Vitals Value Taken Time   /92 03/01/24 1245   Temp 36.6 °C (97.9 °F) 03/01/24 1225   Pulse 65 03/01/24 1245   Resp 16 03/01/24 1245   SpO2 100 % 03/01/24 1245         Event Time   Out of Recovery 13:05:30         Pain/Temo Score: Temo Score: 10 (3/1/2024 12:45 PM)

## 2024-03-01 NOTE — TRANSFER OF CARE
"Anesthesia Transfer of Care Note    Patient: Dayo Crotes    Procedure(s) Performed: Procedure(s) (LRB):  EGD (ESOPHAGOGASTRODUODENOSCOPY) (N/A)    Patient location: GI    Anesthesia Type: general    Transport from OR: Transported from OR on room air with adequate spontaneous ventilation    Post pain: adequate analgesia    Post assessment: no apparent anesthetic complications and tolerated procedure well    Post vital signs: stable    Level of consciousness: awake    Nausea/Vomiting: no nausea/vomiting    Complications: none    Transfer of care protocol was followed      Last vitals: Visit Vitals  /87   Pulse 67   Temp 36.9 °C (98.4 °F) (Skin)   Resp 18   Ht 5' 6" (1.676 m)   Wt 70.3 kg (155 lb)   LMP 03/12/2019 (Exact Date)   SpO2 100%   Breastfeeding No   BMI 25.02 kg/m²     "

## 2024-03-01 NOTE — PLAN OF CARE
Vss, nishant po fluids, denies pain, ambulates easily. IV removed, catheter intact. Discharge instructions provided and states understanding. States ready to go home.  Discharged from facility with family per wheelchair.

## 2024-03-01 NOTE — ANESTHESIA PREPROCEDURE EVALUATION
03/01/2024  Dayo Cortes is a 53 y.o., female.      Pre-op Assessment    I have reviewed the Patient Summary Reports.     I have reviewed the Nursing Notes. I have reviewed the NPO Status.   I have reviewed the Medications.     Review of Systems  Anesthesia Hx:             Denies Family Hx of Anesthesia complications.    Denies Personal Hx of Anesthesia complications.                    Hepatic/GI:     GERD             Musculoskeletal:         Spine Disorders: lumbar                Physical Exam  General: Well nourished, Cooperative, Alert and Oriented    Airway:  Mallampati: II   Mouth Opening: Normal  TM Distance: Normal  Tongue: Normal  Neck ROM: Normal ROM        Anesthesia Plan  Type of Anesthesia, risks & benefits discussed:    Anesthesia Type: Gen Natural Airway  Intra-op Monitoring Plan: Standard ASA Monitors  Induction:  IV  Informed Consent: Informed consent signed with the Patient and all parties understand the risks and agree with anesthesia plan.  All questions answered.   ASA Score: 2    Ready For Surgery From Anesthesia Perspective.     .

## 2024-03-01 NOTE — [employer] PATIENT INSTRUCTIONS
Discharge Summary/Instructions after an Endoscopic Procedure  Patient Name: Dayo Cortes  Patient MRN: [id number]  Patient YOB: 1970  Friday, March 1, 2024  Win García MD  Dear patient,  As a result of recent federal legislation (The Federal Cures Act), you may   receive lab or pathology results from your procedure in your [facility]   account before your physician is able to contact you. Your physician or   their representative will relay the results to you with their   recommendations at their soonest availability.  Thank you,  RESTRICTIONS:  During your procedure today, you received medications for sedation.  These   medications may affect your judgment, balance and coordination.  Therefore,   for 24 hours, you have the following restrictions:   - DO NOT drive a car, operate machinery, make legal/financial decisions,   sign important papers or drink alcohol.    ACTIVITY:  Today: no heavy lifting, straining or running due to procedural   sedation/anesthesia.  The following day: return to full activity including work.  DIET:  Eat and drink normally unless instructed otherwise.     TREATMENT FOR COMMON SIDE EFFECTS:  - Mild abdominal pain, nausea, belching, bloating or excessive gas:  rest,   eat lightly and use a heating pad.  - Sore Throat: treat with throat lozenges and/or gargle with warm salt   water.  - Because air was used during the procedure, expelling large amounts of air   from your rectum or belching is normal.  - If a bowel prep was taken, you may not have a bowel movement for 1-3 days.    This is normal.  SYMPTOMS TO WATCH FOR AND REPORT TO YOUR PHYSICIAN:  1. Abdominal pain or bloating, other than gas cramps.  2. Chest pain.  3. Back pain.  4. Signs of infection such as: chills or fever occurring within 24 hours   after the procedure.  5. Rectal bleeding, which would show as bright red, maroon, or black stools.   (A tablespoon of blood from the rectum is not serious, especially if    hemorrhoids are present.)  6. Vomiting.  7. Weakness or dizziness.  GO DIRECTLY TO THE NEAREST EMERGENCY ROOM IF YOU HAVE ANY OF THE FOLLOWING:      Difficulty breathing              Chills and/or fever over 101 F   Persistent vomiting and/or vomiting blood   Severe abdominal pain   Severe chest pain   Black, tarry stools   Bleeding- more than one tablespoon   Any other symptom or condition that you feel may need urgent attention  Your doctor recommends these additional instructions:  If any biopsies were taken, your doctors clinic will contact you in 1 to 2   weeks with any results.  - Patient has a contact number available for emergencies.  The signs and   symptoms of potential delayed complications were discussed with the   patient.  Return to normal activities tomorrow.  Written discharge   instructions were provided to the patient.   - Resume previous diet.   - Continue present medications.   - No aspirin, ibuprofen, naproxen, or other non-steroidal anti-inflammatory   drugs.   - Await pathology results.   - Discharge patient to home (ambulatory).   - Follow an antireflux regimen.   - Return to GI office after studies are complete.  For questions, problems or results please call your physician - Win García MD at Work:  [phone].  [facility], EMERGENCY ROOM PHONE NUMBER: [phone]  IF A COMPLICATION OR EMERGENCY SITUATION ARISES AND YOU ARE UNABLE TO REACH   YOUR PHYSICIAN - GO DIRECTLY TO THE EMERGENCY ROOM.  Win García MD  3/1/2024 12:19:43 PM  This report has been verified and signed electronically.  Dear patient,  As a result of recent federal legislation (The Federal Cures Act), you may   receive lab or pathology results from your procedure in your [facility]   account before your physician is able to contact you. Your physician or   their representative will relay the results to you with their   recommendations at their soonest availability.  Thank you,  [employer]OVATION

## 2024-03-04 VITALS
OXYGEN SATURATION: 100 % | RESPIRATION RATE: 16 BRPM | HEART RATE: 65 BPM | WEIGHT: 155 LBS | BODY MASS INDEX: 24.91 KG/M2 | HEIGHT: 66 IN | DIASTOLIC BLOOD PRESSURE: 92 MMHG | TEMPERATURE: 98 F | SYSTOLIC BLOOD PRESSURE: 145 MMHG

## 2024-03-19 ENCOUNTER — PATIENT MESSAGE (OUTPATIENT)
Dept: INTERNAL MEDICINE | Facility: CLINIC | Age: 54
End: 2024-03-19
Payer: COMMERCIAL

## 2024-04-08 NOTE — TELEPHONE ENCOUNTER
No care due was identified.  [facility] Embedded Care Due Messages. Reference number: [id number].   4/08/2024 9:41:28 AM CDT

## 2024-04-09 RX ORDER — ACYCLOVIR 400 MG/1
TABLET ORAL
Qty: 180 TABLET | Refills: 1 | Status: SHIPPED | OUTPATIENT
Start: 2024-04-09

## 2024-04-09 NOTE — TELEPHONE ENCOUNTER
Refill Decision Note   Dayo Cortes  is requesting a refill authorization.  Brief Assessment and Rationale for Refill:  Approve     Medication Therapy Plan:         Comments:     Note composed:2:38 AM 04/09/2024

## 2024-06-20 ENCOUNTER — HOSPITAL ENCOUNTER (OUTPATIENT)
Dept: RADIOLOGY | Facility: HOSPITAL | Age: 54
Discharge: HOME | End: 2024-06-20
Payer: COMMERCIAL

## 2024-06-20 ENCOUNTER — OFFICE VISIT (OUTPATIENT)
Dept: FAMILY MEDICINE | Facility: CLINIC | Age: 54
End: 2024-06-20
Payer: COMMERCIAL

## 2024-06-20 ENCOUNTER — PATIENT MESSAGE (OUTPATIENT)
Dept: FAMILY MEDICINE | Facility: CLINIC | Age: 54
End: 2024-06-20

## 2024-06-20 VITALS
DIASTOLIC BLOOD PRESSURE: 68 MMHG | RESPIRATION RATE: 16 BRPM | WEIGHT: 162.94 LBS | BODY MASS INDEX: 26.19 KG/M2 | HEIGHT: 66 IN | SYSTOLIC BLOOD PRESSURE: 100 MMHG | HEART RATE: 100 BPM | OXYGEN SATURATION: 99 %

## 2024-06-20 DIAGNOSIS — R61 EXCESSIVE SWEATING: Primary | ICD-10-CM

## 2024-06-20 DIAGNOSIS — R06.09 OTHER FORM OF DYSPNEA: ICD-10-CM

## 2024-06-20 DIAGNOSIS — Z79.890 HORMONE REPLACEMENT THERAPY (POSTMENOPAUSAL): ICD-10-CM

## 2024-06-20 DIAGNOSIS — R07.9 ACUTE CHEST PAIN: ICD-10-CM

## 2024-06-20 LAB
OHS QRS DURATION: 86 MS
OHS QTC CALCULATION: 440 MS

## 2024-06-20 PROCEDURE — 93010 ELECTROCARDIOGRAM REPORT: CPT | Mod: S$GLB,,, | Performed by: INTERNAL MEDICINE

## 2024-06-20 PROCEDURE — 71275 CT ANGIOGRAPHY CHEST: CPT | Mod: 26,,, | Performed by: RADIOLOGY

## 2024-06-20 PROCEDURE — 71275 CT ANGIOGRAPHY CHEST: CPT | Mod: TC

## 2024-06-20 PROCEDURE — 25500020 PHARM REV CODE 255

## 2024-06-20 PROCEDURE — 99999 PR PBB SHADOW E&M-EST. PATIENT-LVL IV: CPT | Mod: PBBFAC,,,

## 2024-06-20 RX ADMIN — IOHEXOL 75 ML: 350 INJECTION, SOLUTION INTRAVENOUS at 04:06

## 2024-06-20 NOTE — PROGRESS NOTES
Subjective:       Patient ID: Dayo Cortes is a 53 y.o. female.    Chief Complaint: Pain (Bilateral lat pain - reports heavy labored breathing at night - has been taken aleve - also c/o sweating profusely while working out)      Dayo Cortes is a 53 y.o. female with GERD who presents to clinic for evaluation of chest discomfort ongoing for about 4-5 days, becoming progressively worse over the past few days. She reports pain is present in bilateral sides of the chest, which radiates around to the back. Pain is worse at night, making it difficult to obtain a deep breath. Pain is not exacerbated by movement. She reports she has been also having excessive/ profuse sweating which began last week as well. Denies palpations. She reports 1 episode of dizziness a couple of nights ago. Reports she feels more sluggish than normal, and she notes she feels like she isn't able to work out to her usual capacity. She works out frequently, doing crossfit and pilates. Denies leg swelling. No recent prolonged travel. She takes Estrace daily.         Review of Systems   Constitutional:  Positive for fatigue. Negative for activity change and appetite change.   Respiratory:  Positive for shortness of breath. Negative for cough.    Cardiovascular:  Positive for chest pain. Negative for palpitations and leg swelling.   Gastrointestinal:  Negative for abdominal pain, constipation and diarrhea.   Musculoskeletal:  Negative for arthralgias.   Skin:  Negative for rash.   Neurological:  Negative for dizziness, light-headedness and headaches.   Psychiatric/Behavioral:  Negative for dysphoric mood. The patient is not nervous/anxious.          Past Medical History:   Diagnosis Date    Abnormal Pap smear 2006    once but repeated pap smear and the resulted WNLs.    Abnormal Pap smear of cervix     early 30 's became normal    Colon polyp     GERD (gastroesophageal reflux disease)        Review of patient's allergies indicates:  No Known  "Allergies      Current Outpatient Medications:     acyclovir (ZOVIRAX) 400 MG tablet, TAKE 1 TABLET(400 MG) BY MOUTH TWICE DAILY, Disp: 180 tablet, Rfl: 1    estradioL (ESTRACE) 1 MG tablet, TK 1 T PO QD, Disp: , Rfl:     L.acid/B.animalis,bifidum/FOS (PROBIOTIC COMPLEX ORAL), Take by mouth., Disp: , Rfl:     multivitamin capsule, Take 1 capsule by mouth once daily., Disp: , Rfl:     naproxen (NAPROSYN) 500 MG tablet, Take 1 tablet (500 mg total) by mouth 2 (two) times daily as needed., Disp: 60 tablet, Rfl: 5    omeprazole (PRILOSEC) 40 MG capsule, Take 1 capsule (40 mg total) by mouth Daily., Disp: 90 capsule, Rfl: 1  No current facility-administered medications for this visit.    Facility-Administered Medications Ordered in Other Visits:     lactated ringers infusion, , Intravenous, Continuous, Sina Allen MD, Last Rate: 25 mL/hr at 09/14/23 0938, New Bag at 09/14/23 0938    LIDOcaine (PF) 10 mg/ml (1%) injection 10 mg, 1 mL, Intradermal, Once, Sina Allen MD    Objective:        Physical Exam  Vitals reviewed.   Constitutional:       Appearance: Normal appearance.   HENT:      Head: Normocephalic and atraumatic.   Eyes:      Conjunctiva/sclera: Conjunctivae normal.   Cardiovascular:      Rate and Rhythm: Normal rate and regular rhythm.      Heart sounds: Normal heart sounds.   Pulmonary:      Effort: Pulmonary effort is normal.      Breath sounds: Normal breath sounds.   Musculoskeletal:         General: Normal range of motion.      Cervical back: Normal range of motion and neck supple.   Skin:     General: Skin is warm and dry.   Neurological:      Mental Status: She is alert and oriented to person, place, and time.   Psychiatric:         Behavior: Behavior normal.           Visit Vitals  /68 (BP Location: Right arm, Patient Position: Sitting, BP Method: Small (Manual))   Pulse 100   Resp 16   Ht 5' 6" (1.676 m)   Wt 73.9 kg (162 lb 14.7 oz)   LMP 03/12/2019 (Exact Date)   SpO2 99%   BMI 26.30 kg/m²    "   Assessment:         1. Excessive sweating    2. Other form of dyspnea    3. Hormone replacement therapy (postmenopausal)    4. Acute chest pain        Plan:         Dayo was seen today for pain.    Excessive sweating  -     IN OFFICE EKG 12-LEAD (to Muse)    Other form of dyspnea  -     IN OFFICE EKG 12-LEAD (to Muse)    Hormone replacement therapy (postmenopausal)       -    On Estrace    Acute chest pain  -     CTA Chest Non-Coronary (PE Studies); Future  - STAT  -     Wells' Criteria for Pulmonary Embolism from [employer]  on 6/20/2024  ** All calculations should be rechecked by clinician prior to use **    RESULT SUMMARY:  4.5 points  Moderate risk group: 16.2% chance of PE in an ED population.     Another study assigned scores ? 4 as PE Unlikely and had a 3% incidence of PE.    Another study assigned scores > 4 as PE Likely and had a 28% incidence of PE.      INPUTS:  Clinical signs and symptoms of DVT --> 0 = No  PE is #1 diagnosis OR equally likely --> 3 = Yes  Heart rate > 100 --> 1.5 = Yes  Immobilization at least 3 days OR surgery in the previous 4 weeks --> 0 = No  Previous, objectively diagnosed PE or DVT --> 0 = No  Hemoptysis --> 0 = No  Malignancy w/ treatment within 6 months or palliative --> 0 = No         Imaging will be reviewed, and further recommendations will be made based on results.         Family Medicine Physician Assistant     Future Appointments       Date Provider Specialty Appt Notes    9/23/2024 Stoney Stack MD Family Medicine Annual             All of your core healthy metrics are met.       I spent a total of 30 minutes on the day of the visit.This includes face to face time and non-face to face time preparing to see the patient (eg, review of tests), obtaining and/or reviewing separately obtained history, documenting clinical information in the electronic or other health record, independently interpreting results and communicating results to the  patient/family/caregiver, or care coordinator.

## 2024-06-24 ENCOUNTER — TELEPHONE (OUTPATIENT)
Dept: CARDIOLOGY | Facility: HOSPITAL | Age: 54
End: 2024-06-24

## 2024-06-24 NOTE — TELEPHONE ENCOUNTER
Patient advised, test will be at [facility] ([address] [facility]).   Will need to register on the first floor at the main entrance.   Patient advised that arrival time is 9:30am.  Patient advised, may take her medications prior to testing if you need to.    Advised if she needs to eat to take her medications, please keep it light, like toast and juice.    Patient advised to avoid all caffeine 12 hours prior to testing.  This includes decaf tea and coffee.    Wear comfortable clothing.   No lotions, oils, or powders to the upper chest area. May wear deodorant.    Patient verbalizes understanding of instructions.

## 2024-08-29 ENCOUNTER — OFFICE VISIT (OUTPATIENT)
Dept: FAMILY MEDICINE | Facility: CLINIC | Age: 54
End: 2024-08-29
Payer: COMMERCIAL

## 2024-08-29 VITALS
DIASTOLIC BLOOD PRESSURE: 80 MMHG | HEIGHT: 66 IN | SYSTOLIC BLOOD PRESSURE: 132 MMHG | RESPIRATION RATE: 16 BRPM | WEIGHT: 166.88 LBS | BODY MASS INDEX: 26.82 KG/M2 | OXYGEN SATURATION: 98 % | HEART RATE: 78 BPM

## 2024-08-29 DIAGNOSIS — S81.812A LACERATION OF LEFT LOWER EXTREMITY, INITIAL ENCOUNTER: Primary | ICD-10-CM

## 2024-08-29 PROCEDURE — 99213 OFFICE O/P EST LOW 20 MIN: CPT | Mod: S$GLB,,, | Performed by: FAMILY MEDICINE

## 2024-08-29 PROCEDURE — 1160F RVW MEDS BY RX/DR IN RCRD: CPT | Mod: CPTII,S$GLB,, | Performed by: FAMILY MEDICINE

## 2024-08-29 PROCEDURE — 3008F BODY MASS INDEX DOCD: CPT | Mod: CPTII,S$GLB,, | Performed by: FAMILY MEDICINE

## 2024-08-29 PROCEDURE — 3075F SYST BP GE 130 - 139MM HG: CPT | Mod: CPTII,S$GLB,, | Performed by: FAMILY MEDICINE

## 2024-08-29 PROCEDURE — 3079F DIAST BP 80-89 MM HG: CPT | Mod: CPTII,S$GLB,, | Performed by: FAMILY MEDICINE

## 2024-08-29 PROCEDURE — 99999 PR PBB SHADOW E&M-EST. PATIENT-LVL IV: CPT | Mod: PBBFAC,,, | Performed by: FAMILY MEDICINE

## 2024-08-29 PROCEDURE — 1159F MED LIST DOCD IN RCRD: CPT | Mod: CPTII,S$GLB,, | Performed by: FAMILY MEDICINE

## 2024-08-29 RX ORDER — AMOXICILLIN AND CLAVULANATE POTASSIUM 875; 125 MG/1; MG/1
1 TABLET, FILM COATED ORAL 2 TIMES DAILY WITH MEALS
Qty: 14 TABLET | Refills: 0 | Status: SHIPPED | OUTPATIENT
Start: 2024-08-29

## 2024-08-29 RX ORDER — MUPIROCIN 20 MG/G
OINTMENT TOPICAL 3 TIMES DAILY
Qty: 15 G | Refills: 1 | Status: SHIPPED | OUTPATIENT
Start: 2024-08-29

## 2024-08-29 NOTE — PROGRESS NOTES
Subjective:       Patient ID: Dayo Cortes is a 53 y.o. female.    Chief Complaint: No chief complaint on file.    Patient here for UC visit.  2 weeks ago scraped/cut shins when missed a jump up onto a jump box.  Right leg healed/healing OK.  Left lower leg wound was deeper and still open and leg now swollen in general.  Also had a cat bite/scratch to the same leg a few days ago.  No sign of systemic illness.      Review of Systems   Constitutional:  Negative for fever.   Respiratory:  Negative for shortness of breath.    Cardiovascular:  Positive for leg swelling. Negative for chest pain.   Gastrointestinal:  Negative for abdominal pain and nausea.   Skin:  Positive for wound. Negative for rash.   All other systems reviewed and are negative.      Objective:      Physical Exam  Constitutional:       General: She is not in acute distress.     Appearance: She is well-developed.   Cardiovascular:      Rate and Rhythm: Normal rate and regular rhythm.      Heart sounds: No murmur heard.  Pulmonary:      Effort: Pulmonary effort is normal.      Breath sounds: Normal breath sounds.   Musculoskeletal:      Right lower leg: Edema (1 plus) present.      Left lower leg: Edema (1-2 plus) present.   Lymphadenopathy:      Lower Body: No left inguinal adenopathy.   Skin:     General: Skin is warm and dry.             Comments: Below the open wound is a 1 cm superficial scratch/bite elias - also is not inflamed           Assessment:       1. Laceration of left lower extremity, initial encounter        Plan:       Laceration of left lower extremity, initial encounter  -     mupirocin (BACTROBAN) 2 % ointment; Apply topically 3 (three) times daily.  Dispense: 15 g; Refill: 1    Other orders  -     amoxicillin-clavulanate 875-125mg (AUGMENTIN) 875-125 mg per tablet; Take 1 tablet by mouth 2 (two) times daily with meals.  Dispense: 14 tablet; Refill: 0      Patient Instructions   Vitamin C - 1,000 mg and Zinc 50 mg a day.      Contact  your PCP if any worsening or for any new concerns as we discussed.

## 2024-08-29 NOTE — PATIENT INSTRUCTIONS
Vitamin C - 1,000 mg and Zinc 50 mg a day.      Contact your PCP if any worsening or for any new concerns as we discussed.

## 2024-10-16 ENCOUNTER — HOSPITAL ENCOUNTER (OUTPATIENT)
Dept: RADIOLOGY | Facility: CLINIC | Age: 54
Discharge: HOME | End: 2024-10-16
Attending: FAMILY MEDICINE
Payer: COMMERCIAL

## 2024-10-16 DIAGNOSIS — Z12.31 ENCOUNTER FOR SCREENING MAMMOGRAM FOR BREAST CANCER: ICD-10-CM

## 2024-10-16 PROCEDURE — 77063 BREAST TOMOSYNTHESIS BI: CPT | Mod: 26,,, | Performed by: RADIOLOGY

## 2024-10-16 PROCEDURE — 77067 SCR MAMMO BI INCL CAD: CPT | Mod: TC,PO

## 2024-10-16 PROCEDURE — 77067 SCR MAMMO BI INCL CAD: CPT | Mod: 26,,, | Performed by: RADIOLOGY

## 2024-11-08 ENCOUNTER — OFFICE VISIT (OUTPATIENT)
Dept: FAMILY MEDICINE | Facility: CLINIC | Age: 54
End: 2024-11-08
Payer: COMMERCIAL

## 2024-11-08 VITALS
DIASTOLIC BLOOD PRESSURE: 80 MMHG | OXYGEN SATURATION: 98 % | TEMPERATURE: 98 F | SYSTOLIC BLOOD PRESSURE: 138 MMHG | HEART RATE: 102 BPM | HEIGHT: 66 IN | BODY MASS INDEX: 27.1 KG/M2 | WEIGHT: 168.63 LBS

## 2024-11-08 DIAGNOSIS — K13.79 MOUTH SORE: Primary | ICD-10-CM

## 2024-11-08 PROCEDURE — 99999 PR PBB SHADOW E&M-EST. PATIENT-LVL IV: CPT | Mod: PBBFAC,,,

## 2024-11-08 NOTE — PROGRESS NOTES
Subjective:       Patient ID: Dayo Cortes is a 54 y.o. female.    Chief Complaint: sore in mouth      Dayo Cortes is a 54 y.o. female who presents to clinic for evaluation of sore in mouth present for about 1.5 weeks. She believed she ate something too hot on her birthday, causing irritation of the mouth. She has been applying OTC canker sore treatment and taking Motrin as needed. She has h/o of HSV, on suppressive Acyclovir. She inquires if this could be HSV lesion.         Review of Systems   HENT:  Positive for mouth sores.          Past Medical History:   Diagnosis Date    Abnormal Pap smear 2006    once but repeated pap smear and the resulted WNLs.    Abnormal Pap smear of cervix     early 30 's became normal    Colon polyp     GERD (gastroesophageal reflux disease)        Review of patient's allergies indicates:  No Known Allergies      Current Outpatient Medications:     acyclovir (ZOVIRAX) 400 MG tablet, TAKE 1 TABLET(400 MG) BY MOUTH TWICE DAILY, Disp: 180 tablet, Rfl: 1    amoxicillin-clavulanate 875-125mg (AUGMENTIN) 875-125 mg per tablet, Take 1 tablet by mouth 2 (two) times daily with meals., Disp: 14 tablet, Rfl: 0    estradioL (ESTRACE) 1 MG tablet, TK 1 T PO QD, Disp: , Rfl:     L.acid/B.animalis,bifidum/FOS (PROBIOTIC COMPLEX ORAL), Take by mouth., Disp: , Rfl:     multivitamin capsule, Take 1 capsule by mouth once daily., Disp: , Rfl:     mupirocin (BACTROBAN) 2 % ointment, Apply topically 3 (three) times daily., Disp: 15 g, Rfl: 1    naproxen (NAPROSYN) 500 MG tablet, Take 1 tablet (500 mg total) by mouth 2 (two) times daily as needed., Disp: 60 tablet, Rfl: 5    omeprazole (PRILOSEC) 40 MG capsule, Take 1 capsule (40 mg total) by mouth Daily., Disp: 90 capsule, Rfl: 1  No current facility-administered medications for this visit.    Facility-Administered Medications Ordered in Other Visits:     lactated ringers infusion, , Intravenous, Continuous, VuSina MD, Last Rate: 25 mL/hr at  "09/14/23 0938, New Bag at 09/14/23 0938    LIDOcaine (PF) 10 mg/ml (1%) injection 10 mg, 1 mL, Intradermal, Once, Sina Allen MD    Objective:        Physical Exam  Vitals reviewed.   Constitutional:       Appearance: Normal appearance.   HENT:      Head: Normocephalic and atraumatic.      Mouth/Throat:      Lips: No lesions.      Mouth: Mucous membranes are moist.      Dentition: Normal dentition.      Tongue: No lesions.      Pharynx: Oropharynx is clear. No posterior oropharyngeal erythema.     Eyes:      Conjunctiva/sclera: Conjunctivae normal.   Cardiovascular:      Rate and Rhythm: Normal rate.   Pulmonary:      Effort: Pulmonary effort is normal.   Musculoskeletal:      Cervical back: Normal range of motion and neck supple.   Lymphadenopathy:      Cervical: No cervical adenopathy.   Neurological:      Mental Status: She is alert and oriented to person, place, and time.   Psychiatric:         Behavior: Behavior normal.           Visit Vitals  /80 (BP Location: Right arm, Patient Position: Sitting)   Pulse 102   Temp 98.1 °F (36.7 °C) (Oral)   Ht 5' 6" (1.676 m)   Wt 76.5 kg (168 lb 10.4 oz)   LMP 03/12/2019 (Exact Date)   SpO2 98%   BMI 27.22 kg/m²      Assessment:         1. Mouth sore        Plan:         Diagnoses and all orders for this visit:    Mouth sore     -   Recommend OTC Oragel mouth sore/ mouth wash. Recommend she continue to monitor and follow up if no improvement.     -   Consider ENT if no improvement.     Follow up if symptoms worsen or fail to improve as anticipated.          Family Medicine Physician Assistant     Future Appointments       Date Provider Specialty Appt Notes    12/24/2024 Stoney Stack MD Family Medicine Annual             All of your core healthy metrics are met.       I spent a total of 15 minutes on the day of the visit.This includes face to face time and non-face to face time preparing to see the patient (eg, review of tests), obtaining and/or reviewing " separately obtained history, documenting clinical information in the electronic or other health record, independently interpreting results and communicating results to the patient/family/caregiver, or care coordinator.

## 2024-12-02 DIAGNOSIS — M54.2 NECK PAIN: ICD-10-CM

## 2024-12-02 RX ORDER — NAPROXEN 500 MG/1
500 TABLET ORAL 2 TIMES DAILY PRN
Qty: 60 TABLET | Refills: 5 | Status: SHIPPED | OUTPATIENT
Start: 2024-12-02

## 2024-12-02 RX ORDER — ACYCLOVIR 400 MG/1
200 TABLET ORAL 2 TIMES DAILY
Qty: 180 TABLET | Refills: 1 | Status: SHIPPED | OUTPATIENT
Start: 2024-12-02

## 2024-12-02 NOTE — TELEPHONE ENCOUNTER
No care due was identified.  [facility] Embedded Care Due Messages. Reference number: [id number].   12/02/2024 11:21:12 AM CST

## 2024-12-02 NOTE — TELEPHONE ENCOUNTER
Care Due:                  Date            Visit Type   Department     Provider  --------------------------------------------------------------------------------                                SAME DAY -                              ESTABLISHED   [facility]  Last Visit: 08-      PATIENT      MEDICINE       Bertram Phillips                              EP -                              PRIMARY      [facility]  Next Visit: 12-      CARE ([facility])   MEDICINE       Stoney Stack                                                            Last  Test          Frequency    Reason                     Performed    Due Date  --------------------------------------------------------------------------------    CBC.........  12 months..  naproxen.................  08- 08-    Cr..........  12 months..  naproxen.................  09-   09-    [facility] Embedded Care Due Messages. Reference number: [id number].   12/02/2024 11:20:10 AM CST

## 2024-12-23 ENCOUNTER — OFFICE VISIT (OUTPATIENT)
Dept: FAMILY MEDICINE | Facility: CLINIC | Age: 54
End: 2024-12-23
Payer: COMMERCIAL

## 2024-12-23 VITALS
BODY MASS INDEX: 26.86 KG/M2 | SYSTOLIC BLOOD PRESSURE: 136 MMHG | HEIGHT: 66 IN | TEMPERATURE: 99 F | HEART RATE: 81 BPM | RESPIRATION RATE: 16 BRPM | DIASTOLIC BLOOD PRESSURE: 78 MMHG | OXYGEN SATURATION: 99 % | WEIGHT: 167.13 LBS

## 2024-12-23 DIAGNOSIS — Z00.00 HEALTHCARE MAINTENANCE: Primary | ICD-10-CM

## 2024-12-23 PROCEDURE — 3075F SYST BP GE 130 - 139MM HG: CPT | Mod: CPTII,S$GLB,, | Performed by: FAMILY MEDICINE

## 2024-12-23 PROCEDURE — 3008F BODY MASS INDEX DOCD: CPT | Mod: CPTII,S$GLB,, | Performed by: FAMILY MEDICINE

## 2024-12-23 PROCEDURE — 1159F MED LIST DOCD IN RCRD: CPT | Mod: CPTII,S$GLB,, | Performed by: FAMILY MEDICINE

## 2024-12-23 PROCEDURE — 99396 PREV VISIT EST AGE 40-64: CPT | Mod: S$GLB,,, | Performed by: FAMILY MEDICINE

## 2024-12-23 PROCEDURE — 99999 PR PBB SHADOW E&M-EST. PATIENT-LVL IV: CPT | Mod: PBBFAC,,, | Performed by: FAMILY MEDICINE

## 2024-12-23 PROCEDURE — 3078F DIAST BP <80 MM HG: CPT | Mod: CPTII,S$GLB,, | Performed by: FAMILY MEDICINE

## 2024-12-23 NOTE — PROGRESS NOTES
Subjective:   Patient ID: Dayo Cortes is a 54 y.o. female     Chief Complaint:Annual Exam      Here for checkup      Review of Systems   Constitutional:  Negative for chills and fever.   HENT:  Negative for sore throat and trouble swallowing.    Respiratory:  Negative for cough and shortness of breath.    Cardiovascular:  Negative for chest pain and leg swelling.   Gastrointestinal:  Negative for abdominal distention and abdominal pain.   Genitourinary:  Negative for dysuria and flank pain.   Musculoskeletal:  Negative for arthralgias and back pain.   Skin:  Negative for color change and pallor.   Neurological:  Negative for weakness and headaches.   Psychiatric/Behavioral:  Negative for agitation and confusion.      Past Medical History:   Diagnosis Date    Abnormal Pap smear 2006    once but repeated pap smear and the resulted WNLs.    Abnormal Pap smear of cervix     early 30 's became normal    Colon polyp     GERD (gastroesophageal reflux disease)      Past Surgical History:   Procedure Laterality Date    COLONOSCOPY N/A 06/07/2017    Procedure: COLONOSCOPY;  Surgeon: Win Rodriguez MD;  Location: [facility];  Service: Endoscopy;  Laterality: N/A;    ESOPHAGOGASTRODUODENOSCOPY N/A 3/1/2024    Procedure: EGD (ESOPHAGOGASTRODUODENOSCOPY);  Surgeon: Win Rodriguez MD;  Location: [facility];  Service: Endoscopy;  Laterality: N/A;    EXCISION OF PREPATELLAR BURSA Right 04/28/2023    Procedure: EXCISION, PREPATELLAR BURSA;  Surgeon: Bertram Garcia II, MD;  Location: [facility];  Service: Orthopedics;  Laterality: Right;    HYSTERECTOMY      LUMBAR PARAVERTEBRAL FACET JOINT NERVE BLOCK Bilateral 11/17/2020    Procedure: facet joint injection;  Surgeon: Sina Allen MD;  Location: [facility] OR;  Service: Pain Management;  Laterality: Bilateral;  L4-5, L5-S1    LUMBAR PARAVERTEBRAL FACET JOINT NERVE BLOCK Bilateral 09/14/2023    Procedure: BLOCK, NERVE, FACET JOINT, LUMBAR;  Surgeon: Sina Allen MD;  Location:  [facility] OR;  Service: Anesthesiology;  Laterality: Bilateral;  L4-5 and l5-s1 (not MBB facet injections)     Objective:     Vitals:    12/23/24 1505   BP: 136/78   Pulse: 81   Resp: 16   Temp: 99.1 °F (37.3 °C)     Body mass index is 26.97 kg/m².  Physical Exam  Vitals and nursing note reviewed.   Constitutional:       Appearance: She is well-developed.   HENT:      Head: Normocephalic and atraumatic.   Eyes:      General: No scleral icterus.     Conjunctiva/sclera: Conjunctivae normal.   Cardiovascular:      Heart sounds: No murmur heard.  Pulmonary:      Effort: Pulmonary effort is normal. No respiratory distress.   Musculoskeletal:         General: No deformity. Normal range of motion.      Cervical back: Normal range of motion and neck supple.   Skin:     Coloration: Skin is not pale.      Findings: No rash.   Neurological:      Mental Status: She is alert and oriented to person, place, and time.   Psychiatric:         Behavior: Behavior normal.         Thought Content: Thought content normal.         Judgment: Judgment normal.       Assessment:     1. Healthcare maintenance      Plan:   Healthcare maintenance  -     Comprehensive Metabolic Panel; Future; Expected date: 12/23/2024  -     Hemoglobin A1C; Future; Expected date: 12/23/2024  -     Lipid Panel; Future; Expected date: 12/23/2024  -     CBC Auto Differential; Future; Expected date: 12/23/2024  -     TSH; Future; Expected date: 12/23/2024  -     Urinalysis; Future; Expected date: 12/23/2024  -     CK; Future; Expected date: 12/23/2024        Established patient with me has been instructed that must see me at least 1 time yearly (every 365 days) for refills of medications. Seeing other providers in this clinic is fine but expectation is to see me yearly.    Stoney Stack MD  12/23/2024    Portions of this note have been dictated with ODALYS Coyle

## 2024-12-24 ENCOUNTER — LAB VISIT (OUTPATIENT)
Dept: LAB | Facility: HOSPITAL | Age: 54
End: 2024-12-24
Attending: FAMILY MEDICINE
Payer: COMMERCIAL

## 2024-12-24 DIAGNOSIS — Z00.00 HEALTHCARE MAINTENANCE: ICD-10-CM

## 2024-12-24 LAB
ALBUMIN SERPL BCP-MCNC: 3.9 G/DL (ref 3.5–5.2)
ALP SERPL-CCNC: 64 U/L (ref 40–150)
ALT SERPL W/O P-5'-P-CCNC: 18 U/L (ref 10–44)
ANION GAP SERPL CALC-SCNC: 8 MMOL/L (ref 8–16)
AST SERPL-CCNC: 22 U/L (ref 10–40)
BASOPHILS # BLD AUTO: 0.04 K/UL (ref 0–0.2)
BASOPHILS NFR BLD: 0.6 % (ref 0–1.9)
BILIRUB SERPL-MCNC: 0.5 MG/DL (ref 0.1–1)
BUN SERPL-MCNC: 16 MG/DL (ref 6–20)
CALCIUM SERPL-MCNC: 9.5 MG/DL (ref 8.7–10.5)
CHLORIDE SERPL-SCNC: 105 MMOL/L (ref 95–110)
CHOLEST SERPL-MCNC: 251 MG/DL (ref 120–199)
CHOLEST/HDLC SERPL: 3 {RATIO} (ref 2–5)
CK SERPL-CCNC: 287 U/L (ref 20–180)
CO2 SERPL-SCNC: 28 MMOL/L (ref 23–29)
CREAT SERPL-MCNC: 0.9 MG/DL (ref 0.5–1.4)
DIFFERENTIAL METHOD BLD: ABNORMAL
EOSINOPHIL # BLD AUTO: 0.2 K/UL (ref 0–0.5)
EOSINOPHIL NFR BLD: 3.5 % (ref 0–8)
ERYTHROCYTE [DISTWIDTH] IN BLOOD BY AUTOMATED COUNT: 13.2 % (ref 11.5–14.5)
EST. GFR  (NO RACE VARIABLE): >60 ML/MIN/1.73 M^2
ESTIMATED AVG GLUCOSE: 103 MG/DL (ref 68–131)
GLUCOSE SERPL-MCNC: 95 MG/DL (ref 70–110)
HBA1C MFR BLD: 5.2 % (ref 4–5.6)
HCT VFR BLD AUTO: 44.6 % (ref 37–48.5)
HDLC SERPL-MCNC: 85 MG/DL (ref 40–75)
HDLC SERPL: 33.9 % (ref 20–50)
HGB BLD-MCNC: 13.2 G/DL (ref 12–16)
IMM GRANULOCYTES # BLD AUTO: 0.02 K/UL (ref 0–0.04)
IMM GRANULOCYTES NFR BLD AUTO: 0.3 % (ref 0–0.5)
LDLC SERPL CALC-MCNC: 153.2 MG/DL (ref 63–159)
LYMPHOCYTES # BLD AUTO: 3.1 K/UL (ref 1–4.8)
LYMPHOCYTES NFR BLD: 47.3 % (ref 18–48)
MCH RBC QN AUTO: 26 PG (ref 27–31)
MCHC RBC AUTO-ENTMCNC: 29.6 G/DL (ref 32–36)
MCV RBC AUTO: 88 FL (ref 82–98)
MONOCYTES # BLD AUTO: 0.6 K/UL (ref 0.3–1)
MONOCYTES NFR BLD: 9.8 % (ref 4–15)
NEUTROPHILS # BLD AUTO: 2.5 K/UL (ref 1.8–7.7)
NEUTROPHILS NFR BLD: 38.5 % (ref 38–73)
NONHDLC SERPL-MCNC: 166 MG/DL
NRBC BLD-RTO: 0 /100 WBC
PLATELET # BLD AUTO: 249 K/UL (ref 150–450)
PMV BLD AUTO: 10.4 FL (ref 9.2–12.9)
POTASSIUM SERPL-SCNC: 4.5 MMOL/L (ref 3.5–5.1)
PROT SERPL-MCNC: 7.6 G/DL (ref 6–8.4)
RBC # BLD AUTO: 5.08 M/UL (ref 4–5.4)
SODIUM SERPL-SCNC: 141 MMOL/L (ref 136–145)
TRIGL SERPL-MCNC: 64 MG/DL (ref 30–150)
TSH SERPL DL<=0.005 MIU/L-ACNC: 1.51 UIU/ML (ref 0.4–4)
WBC # BLD AUTO: 6.56 K/UL (ref 3.9–12.7)

## 2024-12-24 PROCEDURE — 80061 LIPID PANEL: CPT | Performed by: FAMILY MEDICINE

## 2024-12-24 PROCEDURE — 85025 COMPLETE CBC W/AUTO DIFF WBC: CPT | Performed by: FAMILY MEDICINE

## 2024-12-24 PROCEDURE — 80053 COMPREHEN METABOLIC PANEL: CPT | Performed by: FAMILY MEDICINE

## 2024-12-24 PROCEDURE — 84443 ASSAY THYROID STIM HORMONE: CPT | Performed by: FAMILY MEDICINE

## 2024-12-24 PROCEDURE — 82550 ASSAY OF CK (CPK): CPT | Performed by: FAMILY MEDICINE

## 2024-12-24 PROCEDURE — 83036 HEMOGLOBIN GLYCOSYLATED A1C: CPT | Performed by: FAMILY MEDICINE

## 2025-02-07 ENCOUNTER — TELEPHONE (OUTPATIENT)
Dept: FAMILY MEDICINE | Facility: CLINIC | Age: 55
End: 2025-02-07
Payer: COMMERCIAL

## 2025-03-19 ENCOUNTER — OFFICE VISIT (OUTPATIENT)
Dept: FAMILY MEDICINE | Facility: CLINIC | Age: 55
End: 2025-03-19
Payer: COMMERCIAL

## 2025-03-19 VITALS
OXYGEN SATURATION: 98 % | RESPIRATION RATE: 16 BRPM | TEMPERATURE: 99 F | HEIGHT: 66 IN | WEIGHT: 171.31 LBS | SYSTOLIC BLOOD PRESSURE: 132 MMHG | DIASTOLIC BLOOD PRESSURE: 86 MMHG | HEART RATE: 111 BPM | BODY MASS INDEX: 27.53 KG/M2

## 2025-03-19 DIAGNOSIS — M62.838 MUSCLE SPASM: ICD-10-CM

## 2025-03-19 DIAGNOSIS — R41.840 CONCENTRATION DEFICIT: Primary | ICD-10-CM

## 2025-03-19 PROCEDURE — 99214 OFFICE O/P EST MOD 30 MIN: CPT | Mod: S$GLB,,, | Performed by: FAMILY MEDICINE

## 2025-03-19 PROCEDURE — 1159F MED LIST DOCD IN RCRD: CPT | Mod: CPTII,S$GLB,, | Performed by: FAMILY MEDICINE

## 2025-03-19 PROCEDURE — 3079F DIAST BP 80-89 MM HG: CPT | Mod: CPTII,S$GLB,, | Performed by: FAMILY MEDICINE

## 2025-03-19 PROCEDURE — G2211 COMPLEX E/M VISIT ADD ON: HCPCS | Mod: S$GLB,,, | Performed by: FAMILY MEDICINE

## 2025-03-19 PROCEDURE — 99999 PR PBB SHADOW E&M-EST. PATIENT-LVL III: CPT | Mod: PBBFAC,,, | Performed by: FAMILY MEDICINE

## 2025-03-19 PROCEDURE — 3075F SYST BP GE 130 - 139MM HG: CPT | Mod: CPTII,S$GLB,, | Performed by: FAMILY MEDICINE

## 2025-03-19 PROCEDURE — 3008F BODY MASS INDEX DOCD: CPT | Mod: CPTII,S$GLB,, | Performed by: FAMILY MEDICINE

## 2025-03-19 RX ORDER — ESTRADIOL 2 MG/1
2 TABLET ORAL
COMMUNITY
Start: 2025-03-09

## 2025-03-24 NOTE — PROGRESS NOTES
Subjective:   Patient ID: Dayo Cortes is a 54 y.o. female     Chief Complaint:focusing issues      Here for checkup.  Notes issues with concentration. Feels like has been worsening. Never diagnosed with any concentration related disorder but does have family member with ADD/ADHD      Review of Systems   Respiratory:  Negative for shortness of breath.    Cardiovascular:  Negative for chest pain.   Gastrointestinal:  Negative for abdominal pain.   Genitourinary:  Negative for dysuria.     Past Medical History:   Diagnosis Date    Abnormal Pap smear 2006    once but repeated pap smear and the resulted WNLs.    Abnormal Pap smear of cervix     early 30 's became normal    Colon polyp     GERD (gastroesophageal reflux disease)      Past Surgical History:   Procedure Laterality Date    COLONOSCOPY N/A 06/07/2017    Procedure: COLONOSCOPY;  Surgeon: Win Rodriguez MD;  Location: [facility];  Service: Endoscopy;  Laterality: N/A;    ESOPHAGOGASTRODUODENOSCOPY N/A 3/1/2024    Procedure: EGD (ESOPHAGOGASTRODUODENOSCOPY);  Surgeon: Win Rodriguez MD;  Location: [facility];  Service: Endoscopy;  Laterality: N/A;    EXCISION OF PREPATELLAR BURSA Right 04/28/2023    Procedure: EXCISION, PREPATELLAR BURSA;  Surgeon: Bertram Garcia II, MD;  Location: [facility] OR;  Service: Orthopedics;  Laterality: Right;    HYSTERECTOMY      LUMBAR PARAVERTEBRAL FACET JOINT NERVE BLOCK Bilateral 11/17/2020    Procedure: facet joint injection;  Surgeon: Sina Allen MD;  [facility]: [facility];  Service: Pain Management;  Laterality: Bilateral;  L4-5, L5-S1    LUMBAR PARAVERTEBRAL FACET JOINT NERVE BLOCK Bilateral 09/14/2023    Procedure: BLOCK, NERVE, FACET JOINT, LUMBAR;  Surgeon: Sina Allen MD;  Location: [facility];  Service: Anesthesiology;  Laterality: Bilateral;  L4-5 and l5-s1 (not MBB facet injections)     Objective:     Vitals:    03/19/25 1503   BP: 132/86   Pulse: (!) 111   Resp: 16   Temp: 98.7 °F (37.1 °C)     Body mass index is  27.65 kg/m².  Physical Exam  Vitals and nursing note reviewed.   Constitutional:       Appearance: She is well-developed.   HENT:      Head: Normocephalic and atraumatic.   Eyes:      General: No scleral icterus.     Conjunctiva/sclera: Conjunctivae normal.   Pulmonary:      Effort: Pulmonary effort is normal. No respiratory distress.   Musculoskeletal:         General: No deformity. Normal range of motion.      Cervical back: Normal range of motion and neck supple.   Skin:     Coloration: Skin is not pale.      Findings: No rash.   Neurological:      Mental Status: She is alert and oriented to person, place, and time.   Psychiatric:         Behavior: Behavior normal.         Thought Content: Thought content normal.         Judgment: Judgment normal.       Assessment:     1. Concentration deficit    2. Muscle spasm      Plan:   Concentration deficit  Counseled at length. Discussed treatment options. Discussed expectations. Has already had medical workup from different physician rule out medical causes. Provided referrals and contact information for behavioral health clinics  Muscle spasm  Counseled.     Chronic condition not otherwise mentioned is stable      Total time spent of Greater than 30 minutes minutes on the day of the visit.This includes face to face time and preparing to see the patient, obtaining and reviewing separately obtained history, documenting clinical information in the electronic or other health record, independently interpreting results and communicating results to the patient/family/caregiver, or care coordinator.    Established patient with me has been instructed that must see me at least 1 time yearly (every 365 days) for refills of medications. Seeing other providers in this clinic is fine but expectation is to see me yearly.    Stoney Stack MD  03/24/2025    Portions of this note have been dictated with ODALYS Coyle

## 2025-04-28 ENCOUNTER — OFFICE VISIT (OUTPATIENT)
Dept: SPINE | Facility: CLINIC | Age: 55
End: 2025-04-28
Payer: COMMERCIAL

## 2025-04-28 VITALS — WEIGHT: 171.31 LBS | HEIGHT: 66 IN | BODY MASS INDEX: 27.53 KG/M2

## 2025-04-28 DIAGNOSIS — G89.29 CHRONIC BILATERAL LOW BACK PAIN WITHOUT SCIATICA: Primary | ICD-10-CM

## 2025-04-28 DIAGNOSIS — M54.9 DORSALGIA, UNSPECIFIED: ICD-10-CM

## 2025-04-28 DIAGNOSIS — M54.50 CHRONIC BILATERAL LOW BACK PAIN WITHOUT SCIATICA: Primary | ICD-10-CM

## 2025-04-28 PROCEDURE — 99213 OFFICE O/P EST LOW 20 MIN: CPT | Mod: S$GLB,,, | Performed by: PHYSICAL MEDICINE & REHABILITATION

## 2025-04-28 PROCEDURE — 99999 PR PBB SHADOW E&M-EST. PATIENT-LVL III: CPT | Mod: PBBFAC,,, | Performed by: PHYSICAL MEDICINE & REHABILITATION

## 2025-04-28 PROCEDURE — 1160F RVW MEDS BY RX/DR IN RCRD: CPT | Mod: CPTII,S$GLB,, | Performed by: PHYSICAL MEDICINE & REHABILITATION

## 2025-04-28 PROCEDURE — 3008F BODY MASS INDEX DOCD: CPT | Mod: CPTII,S$GLB,, | Performed by: PHYSICAL MEDICINE & REHABILITATION

## 2025-04-28 PROCEDURE — 1159F MED LIST DOCD IN RCRD: CPT | Mod: CPTII,S$GLB,, | Performed by: PHYSICAL MEDICINE & REHABILITATION

## 2025-04-28 RX ORDER — METHYLPREDNISOLONE 4 MG/1
TABLET ORAL
Qty: 1 EACH | Refills: 0 | Status: SHIPPED | OUTPATIENT
Start: 2025-04-28 | End: 2025-05-19

## 2025-04-28 RX ORDER — TRAMADOL HYDROCHLORIDE 50 MG/1
50 TABLET ORAL EVERY 6 HOURS PRN
Qty: 28 TABLET | Refills: 0 | Status: SHIPPED | OUTPATIENT
Start: 2025-04-28

## 2025-04-28 NOTE — PROGRESS NOTES
SUBJECTIVE:    Patient ID: Dayo Cortes is a 54 y.o. female.    Chief Complaint: Follow-up    This is a 54-year-old woman I have seen in the past with complaints of back and neck pain.  Her last office visit was 10/17/2023 at which time she was status post facet joint injection bilaterally L4-5 and L5-S1 on 09/14/2023 with Dr. Allen.  She described 70% improvement in her back pain symptoms with improved functional mobility.  I have not seen her since then.  She presents now with recurrent familiar low back pain at the lumbosacral junction started spontaneously about a week and a half ago.  No associated radicular symptoms.  No bowel or bladder dysfunction fever chills sweats or unexpected weight loss.  She has been using over-the-counter anti-inflammatory medications and topicals but has not improved to her satisfaction.  Her current pain level is 9/10 and interferes with her quality of life in terms of activities of daily living recreation and social activities.        Past Medical History:   Diagnosis Date    Abnormal Pap smear 2006    once but repeated pap smear and the resulted WNLs.    Abnormal Pap smear of cervix     early 30 's became normal    Colon polyp     GERD (gastroesophageal reflux disease)      Social History[1]  Past Surgical History:   Procedure Laterality Date    COLONOSCOPY N/A 06/07/2017    Procedure: COLONOSCOPY;  Surgeon: Win Rodriguez MD;  Location: [facility];  Service: Endoscopy;  Laterality: N/A;    ESOPHAGOGASTRODUODENOSCOPY N/A 3/1/2024    Procedure: EGD (ESOPHAGOGASTRODUODENOSCOPY);  Surgeon: Win Rodriguez MD;  Location: [facility];  Service: Endoscopy;  Laterality: N/A;    EXCISION OF PREPATELLAR BURSA Right 04/28/2023    Procedure: EXCISION, PREPATELLAR BURSA;  Surgeon: Bertram Garcia II, MD;  Location: [facility];  Service: Orthopedics;  Laterality: Right;    HYSTERECTOMY      LUMBAR PARAVERTEBRAL FACET JOINT NERVE BLOCK Bilateral 11/17/2020    Procedure: facet joint  "injection;  Surgeon: Sina Allen MD;  [facility];  Service: Pain Management;  Laterality: Bilateral;  L4-5, L5-S1    LUMBAR PARAVERTEBRAL FACET JOINT NERVE BLOCK Bilateral 09/14/2023    Procedure: BLOCK, NERVE, FACET JOINT, LUMBAR;  Surgeon: Sina Allen MD;  Location: [facility];  Service: Anesthesiology;  Laterality: Bilateral;  L4-5 and l5-s1 (not MBB facet injections)     Family History   Problem Relation Name Age of Onset    Colon polyps Mother      Breast cancer Mother  40        unconfirmed.    Thyroid disease Maternal Aunt          nodules and hashimotos    Diabetes Maternal Grandmother      Ovarian cancer Neg Hx      Colon cancer Neg Hx      Crohn's disease Neg Hx      Ulcerative colitis Neg Hx       Vitals:    04/28/25 1031   Weight: 77.7 kg (171 lb 4.8 oz)   Height: 5' 6" (1.676 m)       Review of Systems   Constitutional:  Negative for chills, diaphoresis, fatigue, fever and unexpected weight change.   HENT:  Negative for trouble swallowing.    Eyes:  Negative for visual disturbance.   Respiratory:  Negative for shortness of breath.    Cardiovascular:  Negative for chest pain.   Gastrointestinal:  Negative for abdominal pain, constipation, nausea and vomiting.   Genitourinary:  Negative for difficulty urinating.   Musculoskeletal:  Negative for arthralgias, back pain, gait problem, joint swelling, myalgias, neck pain and neck stiffness.   Neurological:  Negative for dizziness, speech difficulty, weakness, light-headedness, numbness and headaches.          Objective:      Physical Exam  Neurological:      Mental Status: She is alert and oriented to person, place, and time.      Comments: She is awake and in no acute distress  No point tenderness or palpable masses about the lumbar spine  Forward flexion of the lumbar spine is to about 60° before she complains of pain at the lumbosacral junction.  Extension combined with rotation to the right and left causes mild pain at the lumbosacral " junction  Reflexes- +1-+2 reflexes at the following:   C5-Biceps   C6-Brachioradialis   C7-Triceps   L3/4-Patellar   S1-Achilles   Luisa sign is negative bilaterally  Strength testing- 5/5 strength in the following muscle groups:  C5-Elbow flexion  C6-Wrist extension  C7-Elbow extension  C8-Finger flexion  T1-Finger abduction  L2-Hip flexion  L3-Knee extension  L4-Ankle dorsiflexion  L5-Great toe extension  S1-Ankle plantar flexion                  Assessment:       1. Chronic bilateral low back pain without sciatica    2. Dorsalgia, unspecified           Plan:     She remains neurologically intact.  I am recommending an updated MRI of the lumbar spine in preparation for likely repeat medial branch blocks/radiofrequency ablation.  In the interim start a Medrol Dosepak and add short term tramadol as needed for pain.  Follow up with me after the scan      Chronic bilateral low back pain without sciatica  -     traMADoL (ULTRAM) 50 mg tablet; Take 1 tablet (50 mg total) by mouth every 6 (six) hours as needed for Pain.  Dispense: 28 tablet; Refill: 0    Dorsalgia, unspecified  -     MRI Lumbar Spine Without Contrast; Future; Expected date: 04/28/2025    Other orders  -     methylPREDNISolone (MEDROL DOSEPACK) 4 mg tablet; use as directed  Dispense: 1 each; Refill: 0                 [1]  Social History  Socioeconomic History    Marital status: Single   Tobacco Use    Smoking status: Never    Smokeless tobacco: Never   Substance and Sexual Activity    Alcohol use: Yes     Comment: occasional    Drug use: No    Sexual activity: Yes     Partners: Male     Birth control/protection: Condom     Social Drivers of Health     Physical Activity: Unknown (11/3/2022)    Exercise Vital Sign     Days of Exercise per Week: 3 days   Stress: No Stress Concern Present (12/11/2020)    [employer] [place] [employer] - Occupational Stress Questionnaire     Feeling of Stress : Only a little   Housing Stability: Unknown  (11/3/2022)    Housing Stability Vital Sign     Unable to Pay for Housing in the Last Year: Patient refused     Unstable Housing in the Last Year: Patient refused

## 2025-05-12 ENCOUNTER — TELEPHONE (OUTPATIENT)
Dept: FAMILY MEDICINE | Facility: CLINIC | Age: 55
End: 2025-05-12
Payer: COMMERCIAL

## 2025-05-12 DIAGNOSIS — B00.9 HERPES SIMPLEX: Primary | ICD-10-CM

## 2025-05-12 RX ORDER — ACYCLOVIR 400 MG/1
400 TABLET ORAL 2 TIMES DAILY
Qty: 180 TABLET | Refills: 3 | Status: SHIPPED | OUTPATIENT
Start: 2025-05-12 | End: 2025-05-13 | Stop reason: SDUPTHER

## 2025-05-12 NOTE — TELEPHONE ENCOUNTER
Patient's previous order was for 1 tablet twice daily.  Order currently listed as 1/2 tablet twice daily.  Patient is requesting order be changed back to 1 tablet twice daily.  Please advise. Thank you.

## 2025-05-12 NOTE — TELEPHONE ENCOUNTER
Tran Hilton Staff     ----- Message from Tran sent at 5/12/2025  8:26 AM CDT -----  Contact: pt [phone]  Type:  RX Refill Request  Who Called:  Pt     Refill or New Rx:  refillRX   Name and Strength:  acyclovir (ZOVIRAX) 400 MG tablet   How is the patient currently taking it? (ex. 1XDay):  -  Is this a 30 day or 90 day RX:  90    Preferred Pharmacy with phone number:  [id number] - [place], [place] - [address] [facility] [place] [address]Bksop: [phone] Fax: [phone] or Mail Order:  Local Ordering Provider:  Emile Catalan Call Back Number:  [phone]    Additional Information:  Pt stated her dosgae was changed with out her knowing. Pt stated she takes 1 pill 2xday. Pt stated she needs a refill and does not want to pay more due to an early refill. Pls call back and advise

## 2025-05-13 DIAGNOSIS — B00.9 HERPES SIMPLEX: ICD-10-CM

## 2025-05-13 RX ORDER — ACYCLOVIR 400 MG/1
400 TABLET ORAL 2 TIMES DAILY
Qty: 180 TABLET | Refills: 3 | Status: SHIPPED | OUTPATIENT
Start: 2025-05-13 | End: 2026-05-13

## 2025-05-13 NOTE — TELEPHONE ENCOUNTER
----- Message from Liza sent at 5/13/2025  8:41 AM CDT -----  Type:  RX Refill Request Who Called:Pt  Refill or New Rx:Refill RX Name and Strength:acyclovir (ZOVIRAX) 400 MG tablet How is the patient currently taking it? (ex. 1XDay):Twice a day  Is this a 30 day or 90 day RX:NA Preferred Pharmacy with phone number:[id number] - [place], [place] - [address] [facility] [facility] [facility] [place] [address]Wtwxh: [phone] Fax: [phone]  Local or Mail Order:NA Ordering Provider: Seda  Would the patient rather a call back or a response via [facility]? Call back  Best Call Back Number:[phone]  Additional Information: Pt waiting for prescription to be filled she is leaving to go out Thursday and need to get medication before she leaves       Please call Back to advise. Thanks!   Alert and oriented to person, place and time

## 2025-05-13 NOTE — TELEPHONE ENCOUNTER
No care due was identified.  [facility] Embedded Care Due Messages. Reference number: [id number].   5/13/2025 9:02:42 AM CDT

## 2025-05-22 ENCOUNTER — CLINICAL SUPPORT (OUTPATIENT)
Dept: FAMILY MEDICINE | Facility: CLINIC | Age: 55
End: 2025-05-22
Payer: COMMERCIAL

## 2025-05-22 VITALS — TEMPERATURE: 98 F

## 2025-05-22 DIAGNOSIS — Z00.00 HEALTHCARE MAINTENANCE: Primary | ICD-10-CM

## 2025-05-22 PROCEDURE — 90715 TDAP VACCINE 7 YRS/> IM: CPT | Mod: S$GLB,,, | Performed by: FAMILY MEDICINE

## 2025-05-22 PROCEDURE — 99999 PR PBB SHADOW E&M-EST. PATIENT-LVL I: CPT | Mod: PBBFAC,,,

## 2025-05-22 PROCEDURE — 90471 IMMUNIZATION ADMIN: CPT | Mod: S$GLB,,, | Performed by: FAMILY MEDICINE

## 2025-08-22 ENCOUNTER — PATIENT MESSAGE (OUTPATIENT)
Dept: FAMILY MEDICINE | Facility: CLINIC | Age: 55
End: 2025-08-22
Payer: COMMERCIAL

## 2025-08-22 DIAGNOSIS — R41.840 CONCENTRATION DEFICIT: Primary | ICD-10-CM

## (undated) DEVICE — SLEEVE SCD EXPRESS KNEE MEDIUM

## (undated) DEVICE — DRAPE THREE-QTR REINF 53X77IN

## (undated) DEVICE — INSTRUMENT FRAZIER 10FR W/VENT

## (undated) DEVICE — CONTAINER SPECIMEN OR STER 4OZ

## (undated) DEVICE — UNDERGLOVES BIOGEL PI SIZE 8

## (undated) DEVICE — TUBING MINIBORE EXTENSION

## (undated) DEVICE — APPLICATOR CHLORAPREP ORN 26ML

## (undated) DEVICE — SPONGE BULKEE II ABSRB 6X6.75

## (undated) DEVICE — STRAP OR TABLE 5IN X 72IN

## (undated) DEVICE — SYR LUER LOCK STERILE 10ML

## (undated) DEVICE — GOWN POLY REINF BRTH SLV 2XL

## (undated) DEVICE — BANDAGE MATRIX HK LOOP 4IN 5YD

## (undated) DEVICE — NDL ECLIPSE SAF REG 25GX1.5IN

## (undated) DEVICE — DRESSING AQUACEL AG 3.5X10IN

## (undated) DEVICE — NDL HYPODERMIC BLUNT 18G 1.5IN

## (undated) DEVICE — ADHESIVE DERMABOND ADVANCED

## (undated) DEVICE — NDL SPINAL SPINOCAN 22GX3.5

## (undated) DEVICE — GLOVE SURG ULTRA TOUCH 8.5

## (undated) DEVICE — SYS LABEL CORRECT MED

## (undated) DEVICE — ELECTRODE REM PLYHSV RETURN 9

## (undated) DEVICE — COVER PROXIMA MAYO STAND

## (undated) DEVICE — ELECTRODE ELECSURG NDL

## (undated) DEVICE — GLOVE SURG ULTRA TOUCH 7.5

## (undated) DEVICE — SUT 2/0 18IN COATED VICRYL

## (undated) DEVICE — GOWN POLY REINF BRTH SLV XL

## (undated) DEVICE — PACK CUSTOM UNIV BASIN SLI

## (undated) DEVICE — NDL SAFETY 21G X 1 1/2 ECLPSE

## (undated) DEVICE — NDL SAFETY 25G X 1.5 ECLIPSE

## (undated) DEVICE — CHLORAPREP 10.5 ML APPLICATOR

## (undated) DEVICE — GLOVE SURG ULTRA TOUCH 8

## (undated) DEVICE — LINER SUCTION 3000CC

## (undated) DEVICE — SUT ETHILON 3-0 PS2 18 BLK

## (undated) DEVICE — PACK LOWER EXTREMITY

## (undated) DEVICE — BANDAGE ESMARK ELASTIC ST 6X9

## (undated) DEVICE — SUT MONOCRYL 3-0 PS-1

## (undated) DEVICE — SYR DISP LL 5CC

## (undated) DEVICE — SOL 9P NACL IRR PIC IL

## (undated) DEVICE — TOWEL OR DISP STRL BLUE 4/PK